# Patient Record
Sex: MALE | Race: WHITE | NOT HISPANIC OR LATINO | Employment: OTHER | URBAN - METROPOLITAN AREA
[De-identification: names, ages, dates, MRNs, and addresses within clinical notes are randomized per-mention and may not be internally consistent; named-entity substitution may affect disease eponyms.]

---

## 2018-02-02 ENCOUNTER — OFFICE VISIT (OUTPATIENT)
Dept: INTERNAL MEDICINE | Facility: CLINIC | Age: 78
End: 2018-02-02
Payer: MEDICARE

## 2018-02-02 VITALS
DIASTOLIC BLOOD PRESSURE: 75 MMHG | HEART RATE: 95 BPM | SYSTOLIC BLOOD PRESSURE: 132 MMHG | RESPIRATION RATE: 16 BRPM | BODY MASS INDEX: 30.38 KG/M2 | HEIGHT: 64 IN | WEIGHT: 177.94 LBS | TEMPERATURE: 98 F

## 2018-02-02 DIAGNOSIS — E55.9 VITAMIN D INSUFFICIENCY: ICD-10-CM

## 2018-02-02 DIAGNOSIS — Z85.828 HX OF BASAL CELL CARCINOMA: ICD-10-CM

## 2018-02-02 DIAGNOSIS — Z12.5 PROSTATE CANCER SCREENING: ICD-10-CM

## 2018-02-02 DIAGNOSIS — Z00.00 ANNUAL PHYSICAL EXAM: Primary | ICD-10-CM

## 2018-02-02 DIAGNOSIS — I10 ESSENTIAL HYPERTENSION: ICD-10-CM

## 2018-02-02 DIAGNOSIS — N41.1 CHRONIC PROSTATITIS: ICD-10-CM

## 2018-02-02 DIAGNOSIS — E66.9 OBESITY (BMI 30-39.9): ICD-10-CM

## 2018-02-02 PROCEDURE — 90670 PCV13 VACCINE IM: CPT | Mod: S$GLB,,, | Performed by: INTERNAL MEDICINE

## 2018-02-02 PROCEDURE — G0009 ADMIN PNEUMOCOCCAL VACCINE: HCPCS | Mod: S$GLB,,, | Performed by: INTERNAL MEDICINE

## 2018-02-02 PROCEDURE — 99387 INIT PM E/M NEW PAT 65+ YRS: CPT | Mod: S$GLB,,, | Performed by: INTERNAL MEDICINE

## 2018-02-02 PROCEDURE — 99999 PR PBB SHADOW E&M-NEW PATIENT-LVL III: CPT | Mod: PBBFAC,,, | Performed by: INTERNAL MEDICINE

## 2018-02-02 RX ORDER — ERGOCALCIFEROL 1.25 MG/1
50000 CAPSULE ORAL
COMMUNITY
Start: 2017-12-30 | End: 2021-07-26 | Stop reason: ALTCHOICE

## 2018-02-02 RX ORDER — LISINOPRIL 10 MG/1
10 TABLET ORAL DAILY
COMMUNITY
Start: 2018-01-19 | End: 2018-09-19 | Stop reason: SDUPTHER

## 2018-02-02 RX ORDER — BIMATOPROST 0.1 MG/ML
SOLUTION/ DROPS OPHTHALMIC
COMMUNITY
Start: 2017-12-30 | End: 2019-03-12 | Stop reason: ALTCHOICE

## 2018-02-02 NOTE — PROGRESS NOTES
Subjective:       Patient ID: Ajit Ayoub is a 77 y.o. male.    Chief Complaint: Establish Care    HPI   77 y.o. Male here for annual exam.     Cholesterol: (needs)  Vaccines: Influenza (2017); Tetanus (2014); Pneumovax (2018); Zoster (2012)  Eye exam: 2018  Colonoscopy: 2011    Exercise: no  Diet: regular    Past Medical History:  No date: Chronic prostatitis  No date: Hyperthyroidism  No date: Obesity (BMI 30-39.9)  No date: Vitamin D insufficiency  Past Surgical History:  No date: TRANSURETHRAL RESECTION OF PROSTATE  Social History    Marital status: Single              Spouse name:                       Years of education:                 Number of children:               Occupational History  Occupation          Employer            Comment               Retired warehouse *                         Social History Main Topics    Smoking status: Never Smoker                                                                Smokeless tobacco: Never Used                        Alcohol use: No              Drug use: No              Sexual activity: Not Currently     Partners with: Female    Review of patient's allergies indicates:  No Known Allergies  Mr. Ayoub had no medications administered during this visit.    Review of Systems   Constitutional: Negative for activity change, appetite change, chills, diaphoresis, fatigue, fever and unexpected weight change.   HENT: Negative for congestion, mouth sores, postnasal drip, rhinorrhea, sinus pressure, sneezing, sore throat, trouble swallowing and voice change.    Eyes: Negative for discharge, itching and visual disturbance.   Respiratory: Negative for cough, chest tightness, shortness of breath and wheezing.    Cardiovascular: Negative for chest pain, palpitations and leg swelling.   Gastrointestinal: Negative for abdominal pain, blood in stool, constipation, diarrhea, nausea and vomiting.   Endocrine: Negative for cold intolerance and heat intolerance.    Genitourinary: Negative for difficulty urinating, dysuria, flank pain, hematuria and urgency.   Musculoskeletal: Negative for arthralgias, back pain, myalgias and neck pain.   Skin: Negative for rash and wound.   Allergic/Immunologic: Negative for environmental allergies and food allergies.   Neurological: Negative for dizziness, tremors, seizures, syncope, weakness and headaches.   Hematological: Negative for adenopathy. Does not bruise/bleed easily.   Psychiatric/Behavioral: Negative for confusion, sleep disturbance and suicidal ideas. The patient is not nervous/anxious.        Objective:      Physical Exam   Constitutional: He is oriented to person, place, and time. He appears well-developed and well-nourished. No distress.   HENT:   Head: Normocephalic and atraumatic.   Right Ear: External ear normal.   Left Ear: External ear normal.   Nose: Nose normal.   Mouth/Throat: Oropharynx is clear and moist. No oropharyngeal exudate.   Eyes: Conjunctivae and EOM are normal. Pupils are equal, round, and reactive to light. Right eye exhibits no discharge. Left eye exhibits no discharge. No scleral icterus.   Neck: Normal range of motion. Neck supple. No JVD present. No thyromegaly present.   Cardiovascular: Normal rate, regular rhythm, normal heart sounds and intact distal pulses.    No murmur heard.  Pulmonary/Chest: Effort normal and breath sounds normal. No respiratory distress. He has no wheezes. He has no rales.   Abdominal: Soft. Bowel sounds are normal. He exhibits no distension. There is no tenderness. There is no guarding.   Musculoskeletal: He exhibits no edema.   Lymphadenopathy:     He has no cervical adenopathy.   Neurological: He is alert and oriented to person, place, and time. No cranial nerve deficit. Coordination normal.   Skin: Skin is warm and dry. No rash noted. He is not diaphoretic. No pallor.   Psychiatric: He has a normal mood and affect. Judgment normal.       Assessment:       1. Annual  physical exam    2. Essential hypertension    3. Chronic prostatitis    4. Obesity (BMI 30-39.9)    5. Vitamin D insufficiency    6. Hx of basal cell carcinoma    7. Prostate cancer screening        Plan:    1. Complete blood work UA       Vaccines: Influenza (2017); Tetanus (2014); Pneumovax (2018); Zoster (2012)       Eye exam: 2018       Colonoscopy: 2011   2. HTN- controlled on Lisinopril 10 mg daily    3. Chronic prostatitis- referral to Urology    4. Obesity- pt advised on proper diet/exercise for weight loss   5. Vitamin D insuff- repeat level       Currently on Rx replacement qmonth    6. Hx of basal cell carcinoma on face- followed by Derm

## 2018-02-09 ENCOUNTER — TELEPHONE (OUTPATIENT)
Dept: INTERNAL MEDICINE | Facility: CLINIC | Age: 78
End: 2018-02-09

## 2018-02-09 ENCOUNTER — OFFICE VISIT (OUTPATIENT)
Dept: INTERNAL MEDICINE | Facility: CLINIC | Age: 78
End: 2018-02-09
Payer: MEDICARE

## 2018-02-09 VITALS
OXYGEN SATURATION: 95 % | RESPIRATION RATE: 16 BRPM | DIASTOLIC BLOOD PRESSURE: 76 MMHG | WEIGHT: 178.38 LBS | HEIGHT: 64 IN | TEMPERATURE: 99 F | SYSTOLIC BLOOD PRESSURE: 142 MMHG | BODY MASS INDEX: 30.45 KG/M2 | HEART RATE: 88 BPM

## 2018-02-09 DIAGNOSIS — N41.1 CHRONIC PROSTATITIS: ICD-10-CM

## 2018-02-09 DIAGNOSIS — T78.40XA ALLERGIC REACTION, INITIAL ENCOUNTER: Primary | ICD-10-CM

## 2018-02-09 PROCEDURE — 1126F AMNT PAIN NOTED NONE PRSNT: CPT | Mod: S$GLB,,, | Performed by: INTERNAL MEDICINE

## 2018-02-09 PROCEDURE — 99999 PR PBB SHADOW E&M-EST. PATIENT-LVL IV: CPT | Mod: PBBFAC,,, | Performed by: INTERNAL MEDICINE

## 2018-02-09 PROCEDURE — 99214 OFFICE O/P EST MOD 30 MIN: CPT | Mod: S$GLB,,, | Performed by: INTERNAL MEDICINE

## 2018-02-09 PROCEDURE — 1159F MED LIST DOCD IN RCRD: CPT | Mod: S$GLB,,, | Performed by: INTERNAL MEDICINE

## 2018-02-09 PROCEDURE — 3008F BODY MASS INDEX DOCD: CPT | Mod: S$GLB,,, | Performed by: INTERNAL MEDICINE

## 2018-02-09 RX ORDER — PREDNISONE 10 MG/1
TABLET ORAL DAILY
Qty: 30 TABLET | Refills: 0 | Status: SHIPPED | OUTPATIENT
Start: 2018-02-09 | End: 2018-02-21

## 2018-02-09 NOTE — TELEPHONE ENCOUNTER
----- Message from Maria D Burns sent at 2/8/2018  3:48 PM CST -----  Contact: self/[phone]  patient called in regards needing to talk with Dr Sen about a rash that he have develop where he receive his pneumonia shot. Patient stated that it goes worse every time that he wear long sleeve shirts.   Please call and advise.       Thank you!!!

## 2018-02-09 NOTE — PROGRESS NOTES
Subjective:       Patient ID: Ajit Ayoub is a 77 y.o. male.    Chief Complaint: Medication Reaction (pt states he recieved pneumonia vaccination friday on RT arm; pt complains of redness and itching near injection site; pt also reports CT scan 2 months ago made him break out in hives; pt also states he has contact dermatitis on feet and sinus problems)    HPI   Patient presenting with medication reaction from pneumonia shot last Friday.  He notes hx of contact dermatitis.  He has seen dermatology in the past, Dr. [facility].  He notes the long sleeve and long pants irritate him.  He denies any fevers or chills.  He denies any trouble breathing or wheezing.  He has hx of sensitive skin and sinus issues.  He also notes that when he got a CAT scan 2 months ago he developed a purpuric rash on his arms and made him break out in hives as well.  He has some itching at the site.       Review of Systems   Constitutional: Negative for activity change, chills and fever.   HENT: Negative for congestion, rhinorrhea, sinus pain, sinus pressure and sore throat.    Eyes: Negative for pain and redness.   Respiratory: Negative for cough and shortness of breath.    Cardiovascular: Negative for chest pain and palpitations.   Gastrointestinal: Negative for abdominal pain, diarrhea, nausea and vomiting.   Genitourinary: Negative for difficulty urinating.   Musculoskeletal: Negative for arthralgias and back pain.   Skin: Positive for rash.   Neurological: Negative for weakness and headaches.   Psychiatric/Behavioral: Negative for dysphoric mood and sleep disturbance.     Objective:     Vitals:    02/09/18 1414   BP: (!) 142/76   Pulse: 88   Resp: 16   Temp: 99.1 °F (37.3 °C)    Body mass index is 30.61 kg/m².  Physical Exam   Constitutional: He is oriented to person, place, and time. He appears well-developed and well-nourished.   HENT:   Head: Normocephalic and atraumatic.   Mouth/Throat: Oropharynx is clear and moist.   Eyes:  Conjunctivae are normal. Pupils are equal, round, and reactive to light.   Neck: Normal range of motion. Neck supple. No thyromegaly present.   Cardiovascular: Normal rate, regular rhythm and normal heart sounds.  Exam reveals no gallop and no friction rub.    No murmur heard.  Pulmonary/Chest: Effort normal and breath sounds normal. He has no wheezes. He has no rales.   Musculoskeletal: Normal range of motion. He exhibits no edema or tenderness.   Lymphadenopathy:     He has no cervical adenopathy.   Neurological: He is alert and oriented to person, place, and time.   Skin: Skin is warm and dry. Rash noted. There is erythema.   Erythema and mild warmth over right upper extremity from previous injection, no fluctuance    Skin does have some mild erythema associated with it    Few hyperpigmented areas noted along the upper extremities    Contact dermatitis of bilateral lower extremities   Psychiatric: He has a normal mood and affect. Judgment normal.     Assessment:     1. Allergic reaction, initial encounter    2. Chronic prostatitis      Plan:   1. Allergic reaction, initial encounter  - predniSONE (DELTASONE) 10 mg tablet pack; Take by mouth once daily. Take 4 tabs x 3 days, 3 tabs x 3 days, 2 tabs x 3 days then 1 tab x 3 day  Dispense: 30 tablet; Refill: 0  - Discussed good skin care  - benadryl prn  - ED precautions provided    2. Chronic prostatitis  - stable, follows with urology      Patient is to call or return to clinic if symptoms worsen or fail to improve.

## 2018-02-09 NOTE — TELEPHONE ENCOUNTER
----- Message from Ana Waite sent at 2/9/2018  4:57 PM CST -----  Contact: Pt [phone]  Pt would like a call back from the nurse to clarify the prescription for his prednisone. IF he is not home please leave a message

## 2018-02-09 NOTE — PATIENT INSTRUCTIONS
Please switch to hypoallergenic detergent- all free and clear or tide    Dove or dial unscented body wash   General Allergic Reactions  An allergic reaction is a set of symptoms caused by an allergen. An allergen is something that causes a persons immune system to react. When a person comes in contact with an allergen, it causes the body to release chemicals. These include the chemical histamine. Histamine causes swelling and itching. It may affect the entire body. This is called a general allergic reaction. Often symptoms affect only 1 part of the body. This is called a local allergic reaction.  You are having an allergic reaction. Almost anything can cause one. Different people are allergic to different things. It is usually something that you ate or swallowed, came into contact with by getting or putting it on your skin or clothes, or something you breathed in the air. This can be very annoying and sometimes scary.  Most of us think of allergic reactions when we have a rash or itchy skin. Symptoms can include:  · Itching of the eyes, nose, and roof of the mouth  · Runny or stuffy nose  · Watery eyes   · Sneezing or coughing   · A blocked feeling in the ear  · Red, itchy rash called hives  · Red and purple spots  · Rash, redness, welts, blisters  · Itching, burning, stinging, pain  · Dry, flaky, cracking, scaly skin  Severe symptoms include:  · Swelling of the face, lips, or other parts of the body  · Hoarse voice  · Trouble swallowing, feeling like your throat is closing  · Trouble breathing, wheezing  · Nausea, vomiting, diarrhea, stomach cramps  · Feeling faint or lightheaded, rapid heart rate  Sometimes the cause may be obvious. But there are so many things that can cause a reaction that you may not be able to figure out. The most important things to help find your allergen are:  · Remembering when it started  · What you were doing at the time or just before that  · Any activities you were involved in  · Any  new products or contacts  Below are some common causes. But remember that almost anything can cause a reaction. You may not even be aware that you came into contact with one of these things:  · Dust, mold, pollen  · Plants (common ones are poison ivy and poison oak, but there are many others)   · Animals  · Foods such as shrimp, shellfish, peanuts, milk products, gluten, and eggs. Also food colorings, flavorings, and additives.  · Insect bites or stings such as bees, mosquitos, fleas, ticks  · Medicines such as penicillin, sulfa medicines, amoxicillin, aspirin, and ibuprofen. But any medicine can cause a reaction.  · Jewelry such as nickel or gold. This can be new, or something youve worn for a while, including zippers and buttons.  · Latex such as in gloves, clothes, toys, balloons, or some tapes. Some people allergic to latex may also have problems with foods like bananas, avocados, kiwi, papaya, or chestnuts.  · Lotions, perfumes, cosmetics, soaps, shampoos, skincare products, nail products  · Chemicals or dyes in clothing, linen, , hair dyes, soaps, iodine  Many viruses and common colds can cause a rash that is not an allergic reaction. Sometimes it is hard to tell the difference between allergies, sensitivity, or an intolerance to something. This is especially true with food. Many things can cause diarrhea, vomiting, stomach cramps, and skin irritation.  Home care    The goal of treatment is to help relieve the symptoms and get you feeling better. The rash will usually fade over several days. But it can sometimes last a couple of weeks. Over the next couple of days, there may be times when it is gets a little worse, and then better again. Here are some things to do:  · If you know what you are allergic to, stay away from it. Future reactions could be worse than this one.  · Avoid tight clothing and anything that heats up your skin (hot showers or baths, direct sunlight). Heat will make itching  worse.  · An ice pack will relieve local areas of intense itching and redness. To make an ice pack, put ice cubes in a plastic bag that seals at the top. Wrap it in a thin, clean towel. Dont put the ice directly on the skin because it can damage the skin.  · Oral diphenhydramine is an over-the-counter antihistamine sold at pharmacy and grocery stores. Unless a prescription antihistamine was given, diphenhydramine may be used to reduce itching if large areas of the skin are involved. It may make you sleepy. So be careful using it in the daytime or when going to school, working, or driving. Note: Dont use diphenhydramine if you have glaucoma or if you are a man with trouble urinating due to an enlarged prostate. There are other antihistamines that wont make you so sleepy. These are good choices for daytime use. Ask your pharmacist for suggestions.  · Dont use diphenhydramine cream on your skin. It can cause a further reaction in some people.  · To help prevent an infection, don't scratch the affected area. Scratching may worsen the reaction and damage your skin. It can also lead to an infection. Always check the affected for signs of an infection.  · Call your healthcare provider and ask what you can use to help decrease the itching.  · To decrease allergic reactions, try the following:    · Use heat-steam to clean your home  · Use high-efficiency particulate (HEPA) vacuums and filters  · Stay away from food and pet triggers  · Kill any cockroaches  · Clean your house often  Follow-up care  Follow up with your healthcare provider, or as advised. If you had a severe reaction today, or if you have had several mild to medium allergic reactions in the past, ask your provider about allergy testing. This can help you find out what you are allergic to. If your reaction included dizziness, fainting, or trouble breathing or swallowing, ask your provider about carrying auto-injectable epinephrine.  Call 911  Call 911 if any  of these occur:  · Trouble breathing or swallowing, wheezing  · Cool, moist, pale skin  · Shortness of breath  · Hoarse voice or trouble speaking  · Confused   · Very drowsy or trouble awakening  · Fainting or loss of consciousness  · Rapid heart rate  · Feeling of dizziness or weakness or a sudden drop in blood pressure  · Feeling of doom  · Feeling lightheaded  · Severe nausea or vomiting, or diarrhea  · Seizure  · Swelling in the face, eyelids, lips, mouth, throat or tongue  · Drooling  When to seek medical advice  Call your healthcare provider right away if any of these occur:  · Spreading areas of itching, redness or swelling  · Nausea or stomach cramps or abdominal pain  · Continuing or recurring symptoms  · Spreading areas of redness, swelling, or itching  · Signs of infection at the affected site:  ¨ Spreading redness  ¨ Increased pain or swelling  ¨ Fluid or colored drainage from the site  ¨ Fever of 100.4°F (38°C) or above lasting for 24 to 48 hours, or as directed by your provider  Date Last Reviewed: 3/1/2017  © 4013-4331 [employer]. [address] [place] [address]. All rights reserved. This information is not intended as a substitute for professional medical care. Always follow your healthcare professional's instructions.

## 2018-02-21 ENCOUNTER — LAB VISIT (OUTPATIENT)
Dept: LAB | Facility: HOSPITAL | Age: 78
End: 2018-02-21
Attending: INTERNAL MEDICINE
Payer: MEDICARE

## 2018-02-21 DIAGNOSIS — Z12.5 PROSTATE CANCER SCREENING: ICD-10-CM

## 2018-02-21 DIAGNOSIS — E55.9 VITAMIN D INSUFFICIENCY: ICD-10-CM

## 2018-02-21 DIAGNOSIS — E66.9 OBESITY (BMI 30-39.9): ICD-10-CM

## 2018-02-21 DIAGNOSIS — I10 ESSENTIAL HYPERTENSION: ICD-10-CM

## 2018-02-21 DIAGNOSIS — Z00.00 ANNUAL PHYSICAL EXAM: ICD-10-CM

## 2018-02-21 DIAGNOSIS — N41.1 CHRONIC PROSTATITIS: ICD-10-CM

## 2018-02-21 LAB
25(OH)D3+25(OH)D2 SERPL-MCNC: 45 NG/ML
ALBUMIN SERPL BCP-MCNC: 3.9 G/DL
ALP SERPL-CCNC: 85 U/L
ALT SERPL W/O P-5'-P-CCNC: 21 U/L
ANION GAP SERPL CALC-SCNC: 9 MMOL/L
AST SERPL-CCNC: 25 U/L
BASOPHILS # BLD AUTO: 0.03 K/UL
BASOPHILS NFR BLD: 0.3 %
BILIRUB SERPL-MCNC: 0.9 MG/DL
BUN SERPL-MCNC: 25 MG/DL
CALCIUM SERPL-MCNC: 9.8 MG/DL
CHLORIDE SERPL-SCNC: 103 MMOL/L
CHOLEST SERPL-MCNC: 196 MG/DL
CHOLEST/HDLC SERPL: 3.4 {RATIO}
CO2 SERPL-SCNC: 29 MMOL/L
COMPLEXED PSA SERPL-MCNC: 2.1 NG/ML
CREAT SERPL-MCNC: 0.9 MG/DL
DIFFERENTIAL METHOD: ABNORMAL
EOSINOPHIL # BLD AUTO: 0 K/UL
EOSINOPHIL NFR BLD: 0.2 %
ERYTHROCYTE [DISTWIDTH] IN BLOOD BY AUTOMATED COUNT: 12.6 %
EST. GFR  (AFRICAN AMERICAN): >60 ML/MIN/1.73 M^2
EST. GFR  (NON AFRICAN AMERICAN): >60 ML/MIN/1.73 M^2
GLUCOSE SERPL-MCNC: 130 MG/DL
HCT VFR BLD AUTO: 45.8 %
HDLC SERPL-MCNC: 58 MG/DL
HDLC SERPL: 29.6 %
HGB BLD-MCNC: 15.4 G/DL
IMM GRANULOCYTES # BLD AUTO: 0.04 K/UL
IMM GRANULOCYTES NFR BLD AUTO: 0.4 %
LDLC SERPL CALC-MCNC: 125.4 MG/DL
LYMPHOCYTES # BLD AUTO: 1.5 K/UL
LYMPHOCYTES NFR BLD: 15.1 %
MCH RBC QN AUTO: 33.4 PG
MCHC RBC AUTO-ENTMCNC: 33.6 G/DL
MCV RBC AUTO: 99 FL
MONOCYTES # BLD AUTO: 0.6 K/UL
MONOCYTES NFR BLD: 5.9 %
NEUTROPHILS # BLD AUTO: 7.7 K/UL
NEUTROPHILS NFR BLD: 78.1 %
NONHDLC SERPL-MCNC: 138 MG/DL
NRBC BLD-RTO: 0 /100 WBC
PLATELET # BLD AUTO: 173 K/UL
PMV BLD AUTO: 11.3 FL
POTASSIUM SERPL-SCNC: 4.3 MMOL/L
PROT SERPL-MCNC: 7.1 G/DL
RBC # BLD AUTO: 4.61 M/UL
SODIUM SERPL-SCNC: 141 MMOL/L
TRIGL SERPL-MCNC: 63 MG/DL
TSH SERPL DL<=0.005 MIU/L-ACNC: 1.06 UIU/ML
WBC # BLD AUTO: 9.91 K/UL

## 2018-02-21 PROCEDURE — 84443 ASSAY THYROID STIM HORMONE: CPT

## 2018-02-21 PROCEDURE — 82306 VITAMIN D 25 HYDROXY: CPT

## 2018-02-21 PROCEDURE — 36415 COLL VENOUS BLD VENIPUNCTURE: CPT | Mod: PO

## 2018-02-21 PROCEDURE — 84153 ASSAY OF PSA TOTAL: CPT

## 2018-02-21 PROCEDURE — 85025 COMPLETE CBC W/AUTO DIFF WBC: CPT

## 2018-02-21 PROCEDURE — 80061 LIPID PANEL: CPT

## 2018-02-21 PROCEDURE — 80053 COMPREHEN METABOLIC PANEL: CPT

## 2018-02-26 ENCOUNTER — TELEPHONE (OUTPATIENT)
Dept: INTERNAL MEDICINE | Facility: CLINIC | Age: 78
End: 2018-02-26

## 2018-02-26 ENCOUNTER — OFFICE VISIT (OUTPATIENT)
Dept: UROLOGY | Facility: CLINIC | Age: 78
End: 2018-02-26
Payer: MEDICARE

## 2018-02-26 VITALS
SYSTOLIC BLOOD PRESSURE: 160 MMHG | HEIGHT: 64 IN | WEIGHT: 178 LBS | DIASTOLIC BLOOD PRESSURE: 89 MMHG | HEART RATE: 81 BPM | BODY MASS INDEX: 30.39 KG/M2

## 2018-02-26 DIAGNOSIS — R31.29 HEMATURIA, MICROSCOPIC: ICD-10-CM

## 2018-02-26 DIAGNOSIS — Z90.79 S/P TURP: ICD-10-CM

## 2018-02-26 DIAGNOSIS — N41.1 CHRONIC PROSTATITIS: Primary | ICD-10-CM

## 2018-02-26 DIAGNOSIS — R73.9 BLOOD GLUCOSE ELEVATED: Primary | ICD-10-CM

## 2018-02-26 DIAGNOSIS — R31.9 HEMATURIA, UNSPECIFIED TYPE: ICD-10-CM

## 2018-02-26 PROCEDURE — 1159F MED LIST DOCD IN RCRD: CPT | Mod: S$GLB,,, | Performed by: UROLOGY

## 2018-02-26 PROCEDURE — 99999 PR PBB SHADOW E&M-EST. PATIENT-LVL IV: CPT | Mod: PBBFAC,,, | Performed by: UROLOGY

## 2018-02-26 PROCEDURE — 3008F BODY MASS INDEX DOCD: CPT | Mod: S$GLB,,, | Performed by: UROLOGY

## 2018-02-26 PROCEDURE — 1126F AMNT PAIN NOTED NONE PRSNT: CPT | Mod: S$GLB,,, | Performed by: UROLOGY

## 2018-02-26 PROCEDURE — 88112 CYTOPATH CELL ENHANCE TECH: CPT | Performed by: PATHOLOGY

## 2018-02-26 PROCEDURE — 99204 OFFICE O/P NEW MOD 45 MIN: CPT | Mod: S$GLB,,, | Performed by: UROLOGY

## 2018-02-26 RX ORDER — SULFAMETHOXAZOLE AND TRIMETHOPRIM 800; 160 MG/1; MG/1
TABLET ORAL
COMMUNITY
Start: 2017-12-30 | End: 2018-12-17

## 2018-02-26 RX ORDER — SULFAMETHOXAZOLE AND TRIMETHOPRIM 400; 80 MG/1; MG/1
1 TABLET ORAL DAILY
Qty: 90 TABLET | Refills: 3 | Status: SHIPPED | OUTPATIENT
Start: 2018-02-26 | End: 2018-12-17

## 2018-02-26 RX ORDER — IBUPROFEN 600 MG/1
TABLET ORAL
COMMUNITY
Start: 2018-01-11 | End: 2019-11-05

## 2018-02-26 NOTE — LETTER
February 26, 2018      Homer Sen DO  [address] [facility] [place] [address]           [facility]  [address] [facility] [address]  Phone: [phone]          Patient: Ajit Ayoub   MR Number: [id number]   YOB: 1940   Date of Visit: 2/26/2018       Dear Dr. Homer Sen:    Thank you for referring Ajit Ayoub to me for evaluation. Attached you will find relevant portions of my assessment and plan of care.    If you have questions, please do not hesitate to call me. I look forward to following Ajit Ayoub along with you.    Sincerely,    Renato Flores MD    Enclosure  CC:  No Recipients    If you would like to receive this communication electronically, please contact [email or web address] or [phone] to request more information on [employer] access.    For providers and/or their staff who would like to refer a patient to [facility], please contact us through our one-stop-shop provider referral line, [facility], at [phone].    If you feel you have received this communication in error or would no longer like to receive these types of communications, please e-mail [email or web address]

## 2018-02-26 NOTE — PROGRESS NOTES
CC: recurrent prostatitis, chronic hematurai    Ajit Ayoub is a 77 y.o. man who is here for the evaluation of Prostatitis (has been seeing dr. adams at [facility] for years, but he is switching care to [facility]. has TURP about 11 years ago. states since the TURP he has chronic prostatitis and has to take 1/2 tablet of bactrim daily . he has been doing this for 12 years. he also states that since the turp he usually has a trace of blood in the urine )    A new pt referred by his PCP, Dr. Sen.  S/p TURP 2007.   has been on Bactrim for suppressive abx for recurrent prostatitis.  Has done well on suppressive abx.  Hx of chronic asymptomatic microscopic hematuria.  Hx of renal cyst.  Denies flank pain, dysuira, hematuria.      Smoking:none  No family of urologic malignancy.  Positive kidney stone hx.    Past Medical History:   Diagnosis Date    Chronic prostatitis     Hyperthyroidism     Obesity (BMI 30-39.9)     Vitamin D insufficiency      Past Surgical History:   Procedure Laterality Date    TRANSURETHRAL RESECTION OF PROSTATE       Social History   Substance Use Topics    Smoking status: Never Smoker    Smokeless tobacco: Never Used    Alcohol use No     Family History   Problem Relation Age of Onset    Diabetes Mother     Heart disease Mother     Diabetes Father     Heart disease Father     Cancer Neg Hx     Stroke Neg Hx      Allergy:  Review of patient's allergies indicates:   Allergen Reactions    Contrast media Rash    Pneumoc 13-lori conj-dip cr(pf) Rash     Outpatient Encounter Prescriptions as of 2/26/2018   Medication Sig Dispense Refill    ergocalciferol (ERGOCALCIFEROL) 50,000 unit Cap       ibuprofen (ADVIL,MOTRIN) 600 MG tablet       lisinopril 10 MG tablet       LUMIGAN 0.01 % Drop       sulfamethoxazole-trimethoprim 800-160mg (BACTRIM DS) 800-160 mg Tab       sulfamethoxazole-trimethoprim 400-80mg (BACTRIM) 400-80 mg per tablet Take 1 tablet by mouth once daily. 90 tablet  3     No facility-administered encounter medications on file as of 2/26/2018.      Review of Systems   General ROS: GENERAL:  No weight gain or loss  SKIN:  No rashes or lacerations  HEAD:  No headaches  EYES:  No exophthalmos, jaundice or blindness  EARS:  No dizziness, tinnitus or hearing loss  NOSE:  No changes in smell  MOUTH & THROAT:  No dyskinetic movements or obvious goiter  CHEST:  No shortness of breath, hyperventilation or cough  CARDIOVASCULAR:  No tachycardia or chest pain  ABDOMEN:  No nausea, vomiting, pain, constipation or diarrhea  URINARY:  No frequency, dysuria or sexual dysfunction  ENDOCRINE:  No polydipsia, polyuria  MUSCULOSKELETAL:  No pain or stiffness of the joints  NEUROLOGIC:  No weakness, sensory changes, seizures, confusion, memory loss, tremor or other abnormal movements  Physical Exam     Vitals:    02/26/18 1311   BP: (!) 160/89   Pulse: 81     Physical Exam   Constitutional: He is oriented to person, place, and time. He appears well-developed and well-nourished. No distress.   HENT:   Head: Normocephalic and atraumatic.   Right Ear: External ear normal.   Left Ear: External ear normal.   Nose: Nose normal.   Mouth/Throat: Oropharynx is clear and moist.   Eyes: Conjunctivae are normal. Pupils are equal, round, and reactive to light.   Neck: Normal range of motion. Neck supple. No JVD present. No tracheal deviation present. No thyromegaly present.   Cardiovascular: Normal rate, regular rhythm, normal heart sounds and intact distal pulses.  Exam reveals no gallop and no friction rub.    No murmur heard.  Pulmonary/Chest: Effort normal and breath sounds normal. No respiratory distress. He has no wheezes. He exhibits no tenderness.   Abdominal: Soft. Bowel sounds are normal. He exhibits no distension and no mass. There is no tenderness. There is no rebound and no guarding.   Genitourinary: Rectum normal and penis normal. No penile tenderness.   Genitourinary Comments: Prostate 30 grams  with negative nodule or negative tenderness.  Slightly boggy.     Musculoskeletal: Normal range of motion. He exhibits no edema, tenderness or deformity.   Lymphadenopathy:     He has no cervical adenopathy.   Neurological: He is alert and oriented to person, place, and time.   Skin: Skin is warm and dry. He is not diaphoretic.     Psychiatric: He has a normal mood and affect. His behavior is normal. Thought content normal.     Genitalia:  Scrotum: no rash or lesion  Normal symmetric epididymis without masses  Normal vas palpated  Normal size, symmetric testicles with no masses   Normal urethral meatus with no discharge  Normal circumcised penis with no lesion   Rectal:  Normal perineum and anus upon inspection.  Normal tone, no masses or tenderness;     LABS:  Lab Results   Component Value Date    PSA 2.1 02/21/2018     No results found for this or any previous visit.  Lab Results   Component Value Date    CREATININE 0.9 02/21/2018     No results found for this or any previous visit.  No results found for: LABURIN  UA trace blood otherwise, clear    Assessment and Plan:  Ajit was seen today for prostatitis.    Diagnoses and all orders for this visit:    Chronic prostatitis  -     POCT urine dipstick without microscope  -     sulfamethoxazole-trimethoprim 400-80mg (BACTRIM) 400-80 mg per tablet; Take 1 tablet by mouth once daily.    Hematuria, microscopic  -     POCT urine dipstick without microscope  -     Cytology, urine    S/P TURP    will do a baseline US.  Urine cytology today.  If normal, will leave him alone.  Continue bactrim SS daily as he has been for many years.  Follow-up:  Follow-up in about 6 months (around 8/26/2018).

## 2018-02-26 NOTE — TELEPHONE ENCOUNTER
----- Message from Homer Sen DO sent at 2/26/2018  1:44 PM CST -----  Mild blood seen in urine study- would like to repeat with Cx in 2 weeks

## 2018-03-02 ENCOUNTER — HOSPITAL ENCOUNTER (OUTPATIENT)
Dept: RADIOLOGY | Facility: HOSPITAL | Age: 78
Discharge: HOME | End: 2018-03-02
Attending: UROLOGY
Payer: MEDICARE

## 2018-03-02 DIAGNOSIS — R31.29 HEMATURIA, MICROSCOPIC: ICD-10-CM

## 2018-03-02 PROCEDURE — 76775 US EXAM ABDO BACK WALL LIM: CPT | Mod: 26,,, | Performed by: RADIOLOGY

## 2018-03-02 PROCEDURE — 76775 US EXAM ABDO BACK WALL LIM: CPT | Mod: TC

## 2018-03-05 ENCOUNTER — TELEPHONE (OUTPATIENT)
Dept: UROLOGY | Facility: CLINIC | Age: 78
End: 2018-03-05

## 2018-03-05 NOTE — TELEPHONE ENCOUNTER
----- Message from Renato Flores MD sent at 3/2/2018  3:31 PM CST -----  Please call the patient regarding abnormal test results.  Follow up with nephrologist regarding his kidney functions.

## 2018-03-07 ENCOUNTER — TELEPHONE (OUTPATIENT)
Dept: UROLOGY | Facility: CLINIC | Age: 78
End: 2018-03-07

## 2018-03-07 NOTE — TELEPHONE ENCOUNTER
----- Message from Penelope Sousa LPN sent at 3/7/2018  7:27 AM CST -----  Contact: self - [phone]  I gave him the ultrasound results and the recommendation to see nephrology per your instructions. He wants to know why he needs to see nephrology. He states that he has no hx of kidney disease   ----- Message -----  From: Tammi Zafar  Sent: 3/6/2018   4:27 PM  To: Mark HYATT Staff    Mark - wants to know why he needs to see a kidney md - please call patient at [phone]

## 2018-03-07 NOTE — TELEPHONE ENCOUNTER
US  #1. Bilateral medical renal disease.      #2.  One simple right renal cyst and one minimally complex right renal cyst.    He is on suppressive abx with SS bactrim.  I would like to have him follow up with a nephrologist.  Left a message.

## 2018-03-08 ENCOUNTER — LAB VISIT (OUTPATIENT)
Dept: LAB | Facility: HOSPITAL | Age: 78
End: 2018-03-08
Attending: INTERNAL MEDICINE
Payer: MEDICARE

## 2018-03-08 ENCOUNTER — OFFICE VISIT (OUTPATIENT)
Dept: NEPHROLOGY | Facility: CLINIC | Age: 78
End: 2018-03-08
Payer: MEDICARE

## 2018-03-08 VITALS
BODY MASS INDEX: 29.81 KG/M2 | HEIGHT: 64 IN | DIASTOLIC BLOOD PRESSURE: 80 MMHG | HEART RATE: 83 BPM | SYSTOLIC BLOOD PRESSURE: 122 MMHG | OXYGEN SATURATION: 95 % | WEIGHT: 174.63 LBS

## 2018-03-08 DIAGNOSIS — R31.29 HEMATURIA, MICROSCOPIC: ICD-10-CM

## 2018-03-08 DIAGNOSIS — N18.1 CKD (CHRONIC KIDNEY DISEASE) STAGE 1, GFR 90 ML/MIN OR GREATER: ICD-10-CM

## 2018-03-08 DIAGNOSIS — I10 ESSENTIAL HYPERTENSION: Primary | ICD-10-CM

## 2018-03-08 LAB
BILIRUB UR QL STRIP: NEGATIVE
CLARITY UR REFRACT.AUTO: ABNORMAL
COLOR UR AUTO: ABNORMAL
CREAT UR-MCNC: 98 MG/DL
CREAT UR-MCNC: 98 MG/DL
GLUCOSE UR QL STRIP: NEGATIVE
HGB UR QL STRIP: NEGATIVE
KETONES UR QL STRIP: ABNORMAL
LEUKOCYTE ESTERASE UR QL STRIP: ABNORMAL
MICROALBUMIN UR DL<=1MG/L-MCNC: 32 UG/ML
MICROALBUMIN/CREATININE RATIO: 32.7 UG/MG
MICROSCOPIC COMMENT: ABNORMAL
NITRITE UR QL STRIP: NEGATIVE
PH UR STRIP: 5 [PH] (ref 5–8)
PROT UR QL STRIP: NEGATIVE
PROT UR-MCNC: 12 MG/DL
PROT/CREAT RATIO, UR: 0.12
RBC #/AREA URNS AUTO: 9 /HPF (ref 0–4)
SP GR UR STRIP: 1.02 (ref 1–1.03)
URN SPEC COLLECT METH UR: ABNORMAL
UROBILINOGEN UR STRIP-ACNC: NEGATIVE EU/DL
WBC #/AREA URNS AUTO: 6 /HPF (ref 0–5)

## 2018-03-08 PROCEDURE — 82043 UR ALBUMIN QUANTITATIVE: CPT

## 2018-03-08 PROCEDURE — 81001 URINALYSIS AUTO W/SCOPE: CPT

## 2018-03-08 PROCEDURE — 3079F DIAST BP 80-89 MM HG: CPT | Mod: S$GLB,,, | Performed by: INTERNAL MEDICINE

## 2018-03-08 PROCEDURE — 3074F SYST BP LT 130 MM HG: CPT | Mod: S$GLB,,, | Performed by: INTERNAL MEDICINE

## 2018-03-08 PROCEDURE — 99204 OFFICE O/P NEW MOD 45 MIN: CPT | Mod: S$GLB,,, | Performed by: INTERNAL MEDICINE

## 2018-03-08 PROCEDURE — 82570 ASSAY OF URINE CREATININE: CPT | Mod: 91

## 2018-03-08 PROCEDURE — 99999 PR PBB SHADOW E&M-EST. PATIENT-LVL III: CPT | Mod: PBBFAC,,, | Performed by: INTERNAL MEDICINE

## 2018-03-08 NOTE — PROGRESS NOTES
Subjective:       Patient ID: Ajit Ayoub is a 77 y.o. White male who presents for new evaluation of hematuria  The patient has a history of chronic prostatitis (on bactrim 400mg daily), BPH, TURP about 11 years ago, HTN x 3-4 yrs, no DM.   The patient has no family history of kidney disease, no history of kidney stones. The patient does not freuqently use NSAIDS or herbal supplements.   The patient is send here by Dr. Flores because of increased echogenicity in his renal US.    HPI  Review of Systems   Constitutional: Negative.    HENT: Negative.    Eyes: Negative.    Respiratory: Negative.    Cardiovascular: Negative.    Gastrointestinal: Negative.  Negative for diarrhea, nausea and vomiting.   Genitourinary: Negative for decreased urine volume, difficulty urinating, dysuria, hematuria and urgency.   Musculoskeletal: Negative.    Skin: Negative.    Neurological: Negative.    Psychiatric/Behavioral: Negative.        Objective:      Physical Exam   Constitutional: He is oriented to person, place, and time. He appears well-developed and well-nourished.   HENT:   Head: Normocephalic and atraumatic.   Right Ear: External ear normal.   Left Ear: External ear normal.   Nose: Nose normal.   Mouth/Throat: Oropharynx is clear and moist.   Eyes: Conjunctivae and EOM are normal. Pupils are equal, round, and reactive to light.   Neck: Normal range of motion. Neck supple. No JVD present.   Cardiovascular: Normal rate, regular rhythm and intact distal pulses.    Murmur (systilic) heard.  Pulmonary/Chest: Effort normal and breath sounds normal. No stridor. No respiratory distress. He has no rales. He exhibits no tenderness.   Abdominal: Soft. Bowel sounds are normal. He exhibits no distension and no mass. There is no tenderness. There is no rebound and no guarding.   Musculoskeletal: Normal range of motion.   Lymphadenopathy:     He has no cervical adenopathy.   Neurological: He is alert and oriented to person, place, and time.  He has normal reflexes. No cranial nerve deficit. Coordination normal.   Skin: Skin is warm and dry.   Psychiatric: He has a normal mood and affect. His behavior is normal. Judgment and thought content normal.   Nursing note and vitals reviewed.      Assessment:       1. Essential hypertension    2. Hematuria, microscopic    3. CKD (chronic kidney disease) stage 1, GFR 90 ml/min or greater        Plan:       1. CKD1: normal kidney function but evidence of increased echogenicity in the renal ultrasound, no evidence of proteinuria.   - We will send the patient for a UA and protein/creatinine ratio and uric acid level.  - Has evidence of microscopic hematuria and is followed by urology, most likely from his prostatitis.     Urine sediment demonstrated a large number of RBC (not dysmorphic) and WBCs with some unidentified larger cells.  - will make Dr. Flores aware - patient might need a cystoscopy.  Lab Results   Component Value Date    CREATININE 0.9 02/21/2018     Protein Creatinine Ratios: will check    No results found for: UTPCR  ·   ·   Acid-Base:   Lab Results   Component Value Date     02/21/2018    K 4.3 02/21/2018    CO2 29 02/21/2018     2. HTN: Blood pressures well controlled.      3. Renal osteodystrophy: last PTH   Lab Results   Component Value Date    CALCIUM 9.8 02/21/2018     4. DM:  Last HbA1C No results found for: HGBA1C    5. Lipid management:   Lab Results   Component Value Date    LDLCALC 125.4 02/21/2018     Will leave message on phone for his lab results    Follow up in 6 month with labs

## 2018-03-08 NOTE — LETTER
March 8, 2018      Renato Flores MD  [address]  [place] [address]           [facility] - Nephrology  [address]  [facility] [address]  Phone: [phone]  Fax: [phone]          Patient: Ajit Ayoub   MR Number: [id number]   YOB: 1940   Date of Visit: 3/8/2018       Dear Dr. Renato Flores:    Thank you for referring Ajit Ayoub to me for evaluation. Attached you will find relevant portions of my assessment and plan of care.    If you have questions, please do not hesitate to call me. I look forward to following Ajit Ayoub along with you.    Sincerely,    Alfa Ramirez MD    Enclosure  CC:  No Recipients    If you would like to receive this communication electronically, please contact [email or web address] or [phone] to request more information on [employer] Link access.    For providers and/or their staff who would like to refer a patient to [facility], please contact us through our one-stop-shop provider referral line, [facility], at [phone].    If you feel you have received this communication in error or would no longer like to receive these types of communications, please e-mail [email or web address]

## 2018-03-09 ENCOUNTER — TELEPHONE (OUTPATIENT)
Dept: UROLOGY | Facility: CLINIC | Age: 78
End: 2018-03-09

## 2018-03-09 DIAGNOSIS — R31.0 HEMATURIA, GROSS: Primary | ICD-10-CM

## 2018-03-09 RX ORDER — CIPROFLOXACIN 250 MG/1
500 TABLET, FILM COATED ORAL ONCE
Status: CANCELLED | OUTPATIENT
Start: 2018-03-09 | End: 2018-03-09

## 2018-03-09 RX ORDER — LIDOCAINE HYDROCHLORIDE 20 MG/ML
JELLY TOPICAL ONCE
Status: CANCELLED | OUTPATIENT
Start: 2018-03-09 | End: 2018-03-09

## 2018-03-09 NOTE — PROGRESS NOTES
Diagnoses and all orders for this visit:    Hematuria, gross  -     Cystoscopy; Future    Other orders  -     lidocaine HCl 2% urojet; Place into the urethra once.  -     ciprofloxacin HCl tablet 500 mg; Take 2 tablets (500 mg total) by mouth once.    per his nephrologist, abnormal RBCs seen on his UA.  Will schedule his cysto.

## 2018-03-09 NOTE — TELEPHONE ENCOUNTER
Offered to schedule cysto on 3.22.2018 at 815am. Patient states he can not come before 930am. He will check his calendar and call me back

## 2018-03-12 ENCOUNTER — LAB VISIT (OUTPATIENT)
Dept: LAB | Facility: HOSPITAL | Age: 78
End: 2018-03-12
Attending: INTERNAL MEDICINE
Payer: MEDICARE

## 2018-03-12 DIAGNOSIS — R73.9 BLOOD GLUCOSE ELEVATED: ICD-10-CM

## 2018-03-12 LAB
ESTIMATED AVG GLUCOSE: 117 MG/DL
HBA1C MFR BLD HPLC: 5.7 %

## 2018-03-12 PROCEDURE — 36415 COLL VENOUS BLD VENIPUNCTURE: CPT | Mod: PO

## 2018-03-12 PROCEDURE — 83036 HEMOGLOBIN GLYCOSYLATED A1C: CPT

## 2018-03-16 ENCOUNTER — TELEPHONE (OUTPATIENT)
Dept: INTERNAL MEDICINE | Facility: CLINIC | Age: 78
End: 2018-03-16

## 2018-03-16 RX ORDER — CIPROFLOXACIN 500 MG/1
500 TABLET ORAL 2 TIMES DAILY
Qty: 14 TABLET | Refills: 0 | Status: SHIPPED | OUTPATIENT
Start: 2018-03-16 | End: 2018-03-23

## 2018-03-16 NOTE — TELEPHONE ENCOUNTER
----- Message from Homer Sen DO sent at 3/16/2018  7:51 AM CDT -----  Repeat urine study done for hematuria shows PSEUDOMONAS AERUGINOSA UTI- I will send Cipro for treatment

## 2018-03-27 NOTE — TELEPHONE ENCOUNTER
Left message reminding him to schedule cystoscope. I also placed him on recall screen so that he will get a reminder letter to call

## 2018-04-10 ENCOUNTER — TELEPHONE (OUTPATIENT)
Dept: OPHTHALMOLOGY | Facility: CLINIC | Age: 78
End: 2018-04-10

## 2018-09-18 ENCOUNTER — OFFICE VISIT (OUTPATIENT)
Dept: UROLOGY | Facility: CLINIC | Age: 78
End: 2018-09-18
Payer: MEDICARE

## 2018-09-18 VITALS
BODY MASS INDEX: 30.01 KG/M2 | SYSTOLIC BLOOD PRESSURE: 162 MMHG | DIASTOLIC BLOOD PRESSURE: 99 MMHG | HEIGHT: 65 IN | HEART RATE: 86 BPM | WEIGHT: 180.13 LBS

## 2018-09-18 DIAGNOSIS — N13.8 BPH WITH URINARY OBSTRUCTION: Primary | ICD-10-CM

## 2018-09-18 DIAGNOSIS — N40.1 BPH WITH URINARY OBSTRUCTION: Primary | ICD-10-CM

## 2018-09-18 DIAGNOSIS — R10.2 PELVIC PAIN: ICD-10-CM

## 2018-09-18 DIAGNOSIS — R31.29 MICROSCOPIC HEMATURIA: ICD-10-CM

## 2018-09-18 DIAGNOSIS — N28.1 RENAL CYST, RIGHT: ICD-10-CM

## 2018-09-18 LAB
BILIRUB UR QL STRIP: NEGATIVE
CLARITY UR REFRACT.AUTO: CLEAR
COLOR UR AUTO: YELLOW
GLUCOSE UR QL STRIP: NEGATIVE
HGB UR QL STRIP: ABNORMAL
KETONES UR QL STRIP: NEGATIVE
LEUKOCYTE ESTERASE UR QL STRIP: ABNORMAL
MICROSCOPIC COMMENT: ABNORMAL
NITRITE UR QL STRIP: NEGATIVE
PH UR STRIP: 5 [PH] (ref 5–8)
PROT UR QL STRIP: NEGATIVE
RBC #/AREA URNS AUTO: 29 /HPF (ref 0–4)
SP GR UR STRIP: 1.02 (ref 1–1.03)
SQUAMOUS #/AREA URNS AUTO: 0 /HPF
URN SPEC COLLECT METH UR: ABNORMAL
UROBILINOGEN UR STRIP-ACNC: NEGATIVE EU/DL
WBC #/AREA URNS AUTO: 5 /HPF (ref 0–5)

## 2018-09-18 PROCEDURE — 3080F DIAST BP >= 90 MM HG: CPT | Mod: CPTII,,, | Performed by: NURSE PRACTITIONER

## 2018-09-18 PROCEDURE — 99215 OFFICE O/P EST HI 40 MIN: CPT | Mod: PBBFAC | Performed by: NURSE PRACTITIONER

## 2018-09-18 PROCEDURE — 1101F PT FALLS ASSESS-DOCD LE1/YR: CPT | Mod: CPTII,,, | Performed by: NURSE PRACTITIONER

## 2018-09-18 PROCEDURE — 99214 OFFICE O/P EST MOD 30 MIN: CPT | Mod: S$PBB,,, | Performed by: NURSE PRACTITIONER

## 2018-09-18 PROCEDURE — 81001 URINALYSIS AUTO W/SCOPE: CPT

## 2018-09-18 PROCEDURE — 88112 CYTOPATH CELL ENHANCE TECH: CPT | Performed by: PATHOLOGY

## 2018-09-18 PROCEDURE — 3077F SYST BP >= 140 MM HG: CPT | Mod: CPTII,,, | Performed by: NURSE PRACTITIONER

## 2018-09-18 PROCEDURE — 99999 PR PBB SHADOW E&M-EST. PATIENT-LVL V: CPT | Mod: PBBFAC,,, | Performed by: NURSE PRACTITIONER

## 2018-09-18 NOTE — PATIENT INSTRUCTIONS
BPH (Enlarged Prostate)  The prostate is a gland at the base of the bladder. As some men get older, the prostate may get bigger in size. This problem is called benign prostatic hyperplasia (BPH). BPH puts pressure on the urethra. This is the tube that carries urine from the bladder to the penis. It may interfere with the flow of urine. It may also keep the bladder from emptying fully.    Symptoms of BPH include trouble starting urination and feeling as though the bladder isnt emptying all the way. It also includes a weak urine stream, dribbling and leaking of urine, and frequent and urgent urination (especially at night). BPH can increase the risk of urinary infections. It can also block off urine flow completely. If this occurs, a thin tube (catheter) may be passed into the bladder to help drain urine.  If symptoms are mild, no treatment may be needed right now. If symptoms are more severe, treatment is likely needed. The goal of treatment is to improve urine flow and reduce symptoms. Treatments can include medicine and procedures. Your healthcare provider will discuss treatment options with you as needed.  Home care  The following guidelines will help you care for yourself at home:  · Urinate as soon as you feel the urge. Don't try to hold your urine.  · Don't limit your fluid intake during the day. Drink 6 to 8 glasses of water or liquids a day. This prevents bacteria from building up in the bladder.  · Avoid drinking fluids after dinner to help reduce urination during the night.  · Avoid medicines that can worsen your symptoms. These include certain cold and allergy medicines and antidepressants. Diuretics used for high blood pressure can also worsen symptoms. Talk to your doctor about the medicines you take. Other choices may work better for you.  Prostate cancer screening  BPH does not increase the risk of prostate cancer. But because prostate cancer is a common cancer in men, screening is sometimes  recommended. This may help detect the cancer in its early stages when treatment is most effective. Factors that can increase the risk of prostate cancer include being -American or having a father or brother who had prostate cancer. A high-fat diet may also increase the risk of prostate cancer. Talk to your healthcare provider to see whether you should be screened for prostate cancer.  Follow-up care  Follow up with your healthcare provider, or as advised  To learn more, go to:  · National Kidney & Urologic Diseases Information Clearinghouse  kidney.niddk.nih.gov, 814.145.9254  When to seek medical advice  Call your healthcare provider right away if any of these occur:  · Fever of 100.4°F (38.0°C) or higher, or as advised  · Unable to pass urine for 8 hours  · Increasing pressure or pain in your bladder (lower abdomen)  · Blood in the urine  · Increasing low back pain, not related to injury  · Symptoms of urinary infection (increased urge to urinate, burning when passing urine, foul-smelling urine)  Date Last Reviewed: 7/1/2016  © 8782-4396 [employer]. [address] [place] [address]. All rights reserved. This information is not intended as a substitute for professional medical care. Always follow your healthcare professional's instructions.        Prostate Problems and Related Urinary Symptoms    Many men have problems with the prostate at some time in their lives. The prostate gland is part of the male reproductive system. Its located just below the bladder. The prostate surrounds the urethra (the tube that carries urine and semen out of the body). The main function of the prostate gland is to add fluid to the semen. When problems occur in the prostate, the bladder and urethra are often affected as well. The most common prostate problems are described below.  BPH  BPH (benign prostatic hyperplasia) develops when changing hormone levels cause the prostate to grow larger. This often  starts around age 50. Excess tissue can block the urethra, making it harder for urine to flow. The enlarged prostate can also press on the bladder, so you may need to urinate more often. Other symptoms include straining during urination, a weak urine stream, urinating more at night, incontinence, dribbling at the end of urination, and feeling that the bladder isnt emptying all the way. Note that BPH is not cancer and does not cause cancer.  How BPH affects the bladder  Pushing to urinate through a narrowed urethra can cause the bladder walls to thicken or stretch out of shape. A stretched bladder may have problems emptying all the way. If urine stays in the bladder longer than it should, you may develop an infection or bladder stones. Also, the kidneys cant drain properly into a bladder that doesnt empty completely. This can lead to kidney failure. Pressure from urine buildup can also cause leaking of urine (called overflow incontinence).  Other prostate problems  · Prostatitis is an infection or inflammation that causes the prostate to become painful and swollen. The swelling narrows the urethra and can block the bladder neck. Prostatitis can cause a burning sensation during urination. You may also feel pressure or pain in the genital area. In some cases, prostatitis can cause fever and chills, and can make you very sick.  · Cancer occurs when abnormal cells form a tumor (a lump of cells that grow uncontrolled). Some tumors can be felt during a physical exam, others cant. Prostate cancer often causes no symptoms at all, especially in its early stages. Prostate symptoms are more likely to be caused by a problem that is NOT cancer.   Date Last Reviewed: 1/1/2017 © 2000-2017 [employer]. [address] [place] [address], [place] [address]. All rights reserved. This information is not intended as a substitute for professional medical care. Always follow your healthcare professional's  instructions.        Chronic Prostatitis/Chronic Pelvic Pain Syndrome (CP/CPPS)      With a healthy prostate, urine flows easily through the urethra.     Chronic prostatitis/chronic pelvic pain syndrome (CP/CPPS) is ongoing pain in the area of the prostate gland. CP/CPPS is the most common of the 4 types of prostatitis which also include acute bacterial prostatitis, chronic bacterial prostatitis, and asymptomatic inflammatory prostatitis. The prostate gland is part of the male reproductive system. It sits just below the bladder and surrounds the urethra. The urethra is the tube that takes urine and semen out of the body. CP/CPPS is the most common form of pain of the gland. It is also known as nonbacterial prostatitis. Symptoms such as pain and trouble urinating may come and go.  What causes CP/CPPS?  The exact cause of CP/CPPS isnt known. It may be caused by an infection that comes back again and again. It may be caused by inflammation of the gland. Muscle spasms in the pelvis may be a cause. Other causes of CP/CPPS may include:  · Stress that tightens the pelvic muscles  · Urine flowing back up into the prostate ducts  · Not ejaculating often  In many cases, the cause isnt clear.  What are the symptoms of CP/CPPS?  Some men dont have symptoms. Or, they may have symptoms that come and go. The symptoms can include:  · Pain in the genitals and pelvic area  · Trouble urinating  · Pain while urinating  · Pain during or after ejaculation  How is CP/CPPS diagnosed?  Your healthcare provider will ask about your medical history and your symptoms. He or she may give you a physical exam, including a rectal exam. Your urine, blood, and semen may be tested for bacteria or certain chemicals. In some cases, you may have other tests. You may have an ultrasound or a transrectal ultrasound-guided biopsy. This is done to take tiny pieces of tissue to look at with a microscope. Or, you may have imaging tests such as a CT scan,  MRI, or urodynamic studies that look at urine flow and other issues. These are done to look at your abdomen and pelvic areas.  How is CP/CPPS treated?  The goal of treatment is to help relieve symptoms. Treatments can include one or more of these:  · Antibiotics  · Anti-inflammatory or muscle-relaxing medicines  · Alpha-blocker medicines, which relax the muscles in and around the gland  · Sitz baths  · Prostate massage  · Dietary changes  · Biofeedback  · Surgery  · Other medicines or herbal treatments  Date Last Reviewed: 1/1/2017 © 2000-2017 [employer]. [address] [place] [address]. All rights reserved. This information is not intended as a substitute for professional medical care. Always follow your healthcare professional's instructions.        Finasteride (Proscar) tablets  What is this medicine?  FINASTERIDE (fi GAEL kelly jack) is used to treat benign prostatic hyperplasia (BPH) in men. This is a condition that causes you to have an enlarged prostate. This medicine helps to control your symptoms, decrease urinary retention, and reduces your risk of needing surgery. When used in combination with certain other medicines, this drug can slow down the progression of your disease.  How should I use this medicine?  Take this medicine by mouth with a glass of water. Follow the directions on the prescription label. You can take this medicine with or without food. Take your doses at regular intervals. Do not take your medicine more often than directed. Do not stop taking except on the advice of your doctor or health care professional.  Talk to your pediatrician regarding the use of this medicine in children. Special care may be needed.  What side effects may I notice from receiving this medicine?  Side effects that you should report to your doctor or health care professional as soon as possible:  · any signs of an allergic reaction like rash, itching, hives or swelling of the lips or  face  · changes in breast like lumps, pain or fluids leaking from the nipple  · pain in the testicles  Side effects that usually do not require medical attention (report to your doctor or health care professional if they continue or are bothersome):  · sexual difficulties like decreased sexual desire or ability to get an erection  · small amount of semen released during sex  What may interact with this medicine?  · saw palmetto or other dietary supplements  What if I miss a dose?  If you miss a dose, take it as soon as you can. If it is almost time for your next dose, take only that dose. Do not take double or extra doses.  Where should I keep my medicine?  Keep out of the reach of children.  Store at room temperature below 30 degrees C (86 degrees F). Protect from light. Keep container tightly closed. Throw away any unused medicine after the expiration date.  What should I tell my health care provider before I take this medicine?  They need to know if you have any of these conditions:  · liver disease  · an unusual or allergic reaction to finasteride, other medicines, foods, dyes, or preservatives  · pregnant or trying to get pregnant  · breast-feeding  What should I watch for while using this medicine?  Do not donate blood while you are taking this medicine. This will prevent giving this medicine to a pregnant female through a blood transfusion. Ask your doctor or health care professional when it is safe to donate blood after you stop taking this medicine.  Women who are pregnant or may get pregnant must not handle broken or crushed finasteride tablets. The active ingredient could harm the unborn baby. If a pregnant woman comes into contact with broken or crushed tablets she should check with her doctor or health care professional. Exposure to whole tablets is not expected to cause harm as long as they are not swallowed.  Contact your doctor or health care professional if your symptoms do not start to get better.  You may need to take this medicine for 6 to 12 months to get the best results.  This medicine can interfere with PSA laboratory tests for prostate cancer. If you are scheduled to have a lab test for prostate cancer, tell your doctor or health care professional that you are taking this medicine.  This medicine may increase your risk of getting some cancers, like breast cancer. Talk with your doctor.  NOTE:This sheet is a summary. It may not cover all possible information. If you have questions about this medicine, talk to your doctor, pharmacist, or health care provider. Copyright© 2017 [employer]

## 2018-09-18 NOTE — PROGRESS NOTES
Subjective:       Patient ID: Ajit Ayoub is a 78 y.o. male.    Chief Complaint: Prostatitis    Ajit Ayoub is a 78 y.o. Male with history of BPH and prostatitis.  History of TURP in 2007.  His TURP he has been taking regular Bactrim (400mg) daily.  He was last seen in clinic with Dr. Flores 02/26/2018; bactrim was refilled.  He was to have a cysto for microscopic hematuria but refused.  He states had before and did not want it again  He has a lot of other pressing issues; allergic rashes.   He never sees blood when urinating.    He here today due to possible prostate infection.  He reports that he gets really stressed and then feels some pelvic discomfort.  Sometimes when stressed difficulty getting urine started.    Today though he does not have those symptoms.  He very concerned about a cyst on his kidney; no flank pain  He states that he has tried Flomax in the past and did not like; feels he allergic to it.                           PSA                      2.1                 02/21/2018                    Past Medical History:  No date: Chronic prostatitis  No date: Hyperthyroidism  No date: Obesity (BMI 30-39.9)  No date: Vitamin D insufficiency    Past Surgical History:  No date: TRANSURETHRAL RESECTION OF PROSTATE    Review of patient's family history indicates:  Problem: Diabetes      Relation: Mother          Age of Onset: (Not Specified)  Problem: Heart disease      Relation: Mother          Age of Onset: (Not Specified)  Problem: Diabetes      Relation: Father          Age of Onset: (Not Specified)  Problem: Heart disease      Relation: Father          Age of Onset: (Not Specified)  Problem: Cancer      Relation: Neg Hx          Age of Onset: (Not Specified)  Problem: Stroke      Relation: Neg Hx          Age of Onset: (Not Specified)      Social History    Socioeconomic History      Marital status: Single      Spouse name: Not on file      Number of children: Not on file      Years of education:  Not on file      Highest education level: Not on file    Social Needs      Financial resource strain: Not on file      Food insecurity - worry: Not on file      Food insecurity - inability: Not on file      Transportation needs - medical: Not on file      Transportation needs - non-medical: Not on file    Occupational History      Occupation: Retired      Tobacco Use      Smoking status: Never Smoker      Smokeless tobacco: Never Used    Substance and Sexual Activity      Alcohol use: No      Drug use: No      Sexual activity: Not Currently        Partners: Female    Other Topics      Concerns:        Not on file    Social History Narrative      Not on file      Allergies:  Contrast media and Pneumoc 13-lori conj-dip cr(pf)    Medications:  Current Outpatient Medications:   ergocalciferol (ERGOCALCIFEROL) 50,000 unit Cap, , Disp: , Rfl:   ibuprofen (ADVIL,MOTRIN) 600 MG tablet, , Disp: , Rfl:   lisinopril 10 MG tablet, Take 10 mg by mouth once daily. , Disp: , Rfl:   LUMIGAN 0.01 % Drop, , Disp: , Rfl:   sulfamethoxazole-trimethoprim 400-80mg (BACTRIM) 400-80 mg per tablet, Take 1 tablet by mouth once daily., Disp: 90 tablet, Rfl: 3  sulfamethoxazole-trimethoprim 800-160mg (BACTRIM DS) 800-160 mg Tab, , Disp: , Rfl:         Review of Systems   Constitutional: Negative for activity change, appetite change, chills and fever.   HENT: Negative for facial swelling and trouble swallowing.    Eyes: Negative for visual disturbance.   Respiratory: Negative for chest tightness and shortness of breath.    Cardiovascular: Negative for chest pain and palpitations.   Gastrointestinal: Negative.  Negative for abdominal pain, constipation, diarrhea, nausea and vomiting.   Genitourinary: Negative for difficulty urinating, dysuria, flank pain, hematuria, penile pain, penile swelling, scrotal swelling and testicular pain.        Today FOS is good.  Nocturia 0-1x     Musculoskeletal: Negative for back pain, gait  problem, myalgias and neck stiffness.   Skin: Positive for rash.   Neurological: Negative for dizziness and speech difficulty.   Hematological: Does not bruise/bleed easily.   Psychiatric/Behavioral: Negative for behavioral problems. The patient is nervous/anxious.        Objective:      Physical Exam   Nursing note and vitals reviewed.  Constitutional: He is oriented to person, place, and time. He appears well-developed and well-nourished. He is active and cooperative.  Non-toxic appearance. He does not have a sickly appearance.   Urine dipped clear of infection.   Microscopic hematuria.     HENT:   Head: Normocephalic and atraumatic.   Right Ear: External ear normal.   Left Ear: External ear normal.   Nose: Nose normal.   Mouth/Throat: Mucous membranes are normal.   Eyes: Conjunctivae and lids are normal. No scleral icterus.   Neck: Trachea normal, normal range of motion and full passive range of motion without pain. Neck supple. No JVD present. No tracheal deviation present.   Cardiovascular: Normal rate, S1 normal and S2 normal.    Pulmonary/Chest: Effort normal. No respiratory distress. He exhibits no tenderness.   Abdominal: Soft. Normal appearance and bowel sounds are normal. There is no hepatosplenomegaly. There is no tenderness. There is no CVA tenderness.   Genitourinary: Rectum normal, testes normal and penis normal. Right testis shows no mass, no swelling and no tenderness. Left testis shows no mass, no swelling and no tenderness. Uncircumcised. No phimosis, paraphimosis, hypospadias, penile erythema or penile tenderness. No discharge found.       Musculoskeletal: Normal range of motion.   Lymphadenopathy: No inguinal adenopathy noted on the right or left side.   Neurological: He is alert and oriented to person, place, and time. He has normal strength.   Skin: Skin is warm, dry and intact. Lesion and rash noted. There is erythema.     (+) erythema to his arms.     Psychiatric: He has a normal mood and  affect. His behavior is normal. Judgment and thought content normal.       Assessment:       1. BPH with urinary obstruction    2. Renal cyst, right    3. Pelvic pain    4. Microscopic hematuria        Plan:         I spent 25 minutes with the patient of which more than half was spent in direct consultation with the patient in regards to our treatment and plan.    Education and recommendations of today's plan of care including home remedies.  We discussed his LUTS and possible contributory factors.  Discussed LUTS not always due to infection.  Preventive measures discussed.  We discussed finasteride since unable to take Flomax; benefits risks se.  Repeat renal u/s.   Discussed why Dr. Flores wanted to have cysto done; voiced understanding; he ok without it being done  We did collect urine today for cytology.

## 2018-09-19 NOTE — TELEPHONE ENCOUNTER
New patient saw you first visit feb 2018.  he is out of refills from old dr and needs approved by you.    I  Verified and loaded 6 mos renewal to get him to next appt due.  I told him  will approve Thursday am to pharmacy.  Pharmacy verified.    Thanks arlen

## 2018-09-19 NOTE — TELEPHONE ENCOUNTER
"----- Message from Daija Vivar sent at 9/19/2018  3:03 PM CDT -----  Contact: [phone]  RX request - refill or new RX.  Is this a refill or new RX:  New  RX name and strength: lisinopril 10 MG tablet  Directions:   Is this a 30 day or 90 day RX:    Local pharmacy or mail order pharmacy:    Pharmacy name and phone # (DON'T enter "on file" or "in chart"): [email or web address] [id number] AT [facility] OF [address] [phone] (Phone)  [phone] (Fax)      Comments:        "

## 2018-09-20 ENCOUNTER — TELEPHONE (OUTPATIENT)
Dept: UROLOGY | Facility: CLINIC | Age: 78
End: 2018-09-20

## 2018-09-20 ENCOUNTER — HOSPITAL ENCOUNTER (OUTPATIENT)
Dept: RADIOLOGY | Facility: HOSPITAL | Age: 78
Discharge: HOME | End: 2018-09-20
Attending: NURSE PRACTITIONER
Payer: MEDICARE

## 2018-09-20 DIAGNOSIS — N28.1 RENAL CYST, RIGHT: ICD-10-CM

## 2018-09-20 DIAGNOSIS — N40.1 BPH WITH URINARY OBSTRUCTION: ICD-10-CM

## 2018-09-20 DIAGNOSIS — R10.2 PELVIC PAIN: ICD-10-CM

## 2018-09-20 DIAGNOSIS — N13.8 BPH WITH URINARY OBSTRUCTION: ICD-10-CM

## 2018-09-20 PROCEDURE — 76770 US EXAM ABDO BACK WALL COMP: CPT | Mod: 26,,, | Performed by: RADIOLOGY

## 2018-09-20 PROCEDURE — 76770 US EXAM ABDO BACK WALL COMP: CPT | Mod: TC

## 2018-09-20 RX ORDER — LISINOPRIL 10 MG/1
10 TABLET ORAL DAILY
Qty: 90 TABLET | Refills: 1 | Status: SHIPPED | OUTPATIENT
Start: 2018-09-20 | End: 2019-03-12 | Stop reason: ALTCHOICE

## 2018-09-20 NOTE — TELEPHONE ENCOUNTER
Patient notified that u/s did not show anything unusual.   Renal cysts present but nothing to worry about.   Small right kidney stone.   He sees Dr. Flores in November.

## 2018-11-12 ENCOUNTER — OFFICE VISIT (OUTPATIENT)
Dept: UROLOGY | Facility: CLINIC | Age: 78
End: 2018-11-12
Payer: MEDICARE

## 2018-11-12 ENCOUNTER — HOSPITAL ENCOUNTER (OUTPATIENT)
Dept: RADIOLOGY | Facility: HOSPITAL | Age: 78
Discharge: HOME | End: 2018-11-12
Attending: UROLOGY
Payer: MEDICARE

## 2018-11-12 VITALS
BODY MASS INDEX: 29.34 KG/M2 | WEIGHT: 176.13 LBS | HEART RATE: 73 BPM | SYSTOLIC BLOOD PRESSURE: 131 MMHG | HEIGHT: 65 IN | DIASTOLIC BLOOD PRESSURE: 81 MMHG

## 2018-11-12 DIAGNOSIS — N20.0 KIDNEY STONE: ICD-10-CM

## 2018-11-12 DIAGNOSIS — Z90.79 S/P TURP: Primary | ICD-10-CM

## 2018-11-12 DIAGNOSIS — F41.9 ANXIETY: ICD-10-CM

## 2018-11-12 DIAGNOSIS — R31.29 HEMATURIA, MICROSCOPIC: ICD-10-CM

## 2018-11-12 DIAGNOSIS — N41.1 CHRONIC PROSTATITIS: ICD-10-CM

## 2018-11-12 PROCEDURE — 3075F SYST BP GE 130 - 139MM HG: CPT | Mod: CPTII,HCNC,S$GLB, | Performed by: UROLOGY

## 2018-11-12 PROCEDURE — 74018 RADEX ABDOMEN 1 VIEW: CPT | Mod: 26,HCNC,, | Performed by: RADIOLOGY

## 2018-11-12 PROCEDURE — 99999 PR PBB SHADOW E&M-EST. PATIENT-LVL III: CPT | Mod: PBBFAC,,, | Performed by: UROLOGY

## 2018-11-12 PROCEDURE — 99215 OFFICE O/P EST HI 40 MIN: CPT | Mod: HCNC,25,S$GLB, | Performed by: UROLOGY

## 2018-11-12 PROCEDURE — 3079F DIAST BP 80-89 MM HG: CPT | Mod: CPTII,HCNC,S$GLB, | Performed by: UROLOGY

## 2018-11-12 PROCEDURE — 1101F PT FALLS ASSESS-DOCD LE1/YR: CPT | Mod: CPTII,HCNC,S$GLB, | Performed by: UROLOGY

## 2018-11-12 PROCEDURE — 81002 URINALYSIS NONAUTO W/O SCOPE: CPT | Mod: S$GLB,,, | Performed by: UROLOGY

## 2018-11-12 PROCEDURE — 74018 RADEX ABDOMEN 1 VIEW: CPT | Mod: TC,HCNC,PO

## 2018-11-12 RX ORDER — LIDOCAINE HYDROCHLORIDE 20 MG/ML
JELLY TOPICAL ONCE
Status: CANCELLED | OUTPATIENT
Start: 2018-11-12 | End: 2018-11-12

## 2018-11-12 RX ORDER — ALPRAZOLAM 0.25 MG/1
0.25 TABLET ORAL 2 TIMES DAILY PRN
Qty: 60 TABLET | Refills: 0 | Status: SHIPPED | OUTPATIENT
Start: 2018-11-12 | End: 2019-03-12 | Stop reason: SDUPTHER

## 2018-11-12 RX ORDER — CIPROFLOXACIN 250 MG/1
500 TABLET, FILM COATED ORAL ONCE
Status: CANCELLED | OUTPATIENT
Start: 2018-11-12 | End: 2018-11-12

## 2018-11-12 NOTE — PROGRESS NOTES
CC: anxiety and stress affecting his LUTS, hx of MH    Subjective:       Patient ID: Ajit Ayoub is a 78 y.o. male.    Chief Complaint: Follow-up (6 mths follow up)    Ajit Ayoub is a 78 y.o. Male with history of MH, BPH and prostatitis.  Recently seen by Jaye Roberts.  C/o increased frequency, urgency, and prostate pain, especially when he gets stressed out or anxious.  He would like to get something to control his anxiety and stress.    History of TURP in 2007.  His TURP he has been taking regular Bactrim (400mg) daily.  He was last seen in clinic with Dr. Flores 02/26/2018; bactrim was refilled.  He was to have a cysto for microscopic hematuria but refused.  He states had before and did not want it again  He has a lot of other pressing issues; allergic rashes.   He never sees blood when urinating.    He here today due to possible prostate infection.  He reports that he gets really stressed and then feels some pelvic discomfort.  Sometimes when stressed difficulty getting urine started.    Today though he does not have those symptoms.  He very concerned about a cyst on his kidney; no flank pain  He states that he has tried Flomax in the past and did not like; feels he allergic to it.                           PSA                      2.1                 02/21/2018                    Past Medical History:  No date: Chronic prostatitis  No date: Hyperthyroidism  No date: Obesity (BMI 30-39.9)  No date: Vitamin D insufficiency    Past Surgical History:  No date: TRANSURETHRAL RESECTION OF PROSTATE    Review of patient's family history indicates:  Problem: Diabetes      Relation: Mother          Age of Onset: (Not Specified)  Problem: Heart disease      Relation: Mother          Age of Onset: (Not Specified)  Problem: Diabetes      Relation: Father          Age of Onset: (Not Specified)  Problem: Heart disease      Relation: Father          Age of Onset: (Not Specified)  Problem: Cancer      Relation: Neg  Hx          Age of Onset: (Not Specified)  Problem: Stroke      Relation: Neg Hx          Age of Onset: (Not Specified)      Social History    Socioeconomic History      Marital status: Single      Spouse name: Not on file      Number of children: Not on file      Years of education: Not on file      Highest education level: Not on file    Social Needs      Financial resource strain: Not on file      Food insecurity - worry: Not on file      Food insecurity - inability: Not on file      Transportation needs - medical: Not on file      Transportation needs - non-medical: Not on file    Occupational History      Occupation: Retired      Tobacco Use      Smoking status: Never Smoker      Smokeless tobacco: Never Used    Substance and Sexual Activity      Alcohol use: No      Drug use: No      Sexual activity: Not Currently        Partners: Female    Other Topics      Concerns:        Not on file    Social History Narrative      Not on file      Allergies:  Contrast media and Pneumoc 13-lori conj-dip cr(pf)    Medications:  Current Outpatient Medications:   ergocalciferol (ERGOCALCIFEROL) 50,000 unit Cap, , Disp: , Rfl:   ibuprofen (ADVIL,MOTRIN) 600 MG tablet, , Disp: , Rfl:   lisinopril 10 MG tablet, Take 10 mg by mouth once daily. , Disp: , Rfl:   LUMIGAN 0.01 % Drop, , Disp: , Rfl:   sulfamethoxazole-trimethoprim 400-80mg (BACTRIM) 400-80 mg per tablet, Take 1 tablet by mouth once daily., Disp: 90 tablet, Rfl: 3  sulfamethoxazole-trimethoprim 800-160mg (BACTRIM DS) 800-160 mg Tab, , Disp: , Rfl:         Review of Systems   Constitutional: Negative for activity change, appetite change, chills and fever.   HENT: Negative for facial swelling and trouble swallowing.    Eyes: Negative for visual disturbance.   Respiratory: Negative for chest tightness and shortness of breath.    Cardiovascular: Negative for chest pain and palpitations.   Gastrointestinal: Negative.  Negative for abdominal pain,  constipation, diarrhea, nausea and vomiting.   Genitourinary: Negative for difficulty urinating, dysuria, flank pain, hematuria, penile pain, penile swelling, scrotal swelling and testicular pain.        Today FOS is good.  Nocturia 0-1x     Musculoskeletal: Negative for back pain, gait problem, myalgias and neck stiffness.   Skin: Positive for rash.   Neurological: Negative for dizziness and speech difficulty.   Hematological: Does not bruise/bleed easily.   Psychiatric/Behavioral: Negative for behavioral problems. The patient is nervous/anxious.        Objective:      Physical Exam   Nursing note and vitals reviewed.  Constitutional: He is oriented to person, place, and time. He appears well-developed and well-nourished. He is active and cooperative.  Non-toxic appearance. He does not have a sickly appearance.   Urine dipped clear of infection.   Microscopic hematuria.     HENT:   Head: Normocephalic and atraumatic.   Right Ear: External ear normal.   Left Ear: External ear normal.   Nose: Nose normal.   Mouth/Throat: Mucous membranes are normal.   Eyes: Conjunctivae and lids are normal. No scleral icterus.   Neck: Trachea normal, normal range of motion and full passive range of motion without pain. Neck supple. No JVD present. No tracheal deviation present.   Cardiovascular: Normal rate, S1 normal and S2 normal.    Pulmonary/Chest: Effort normal. No respiratory distress. He exhibits no tenderness.   Abdominal: Soft. Normal appearance and bowel sounds are normal. There is no hepatosplenomegaly. There is no tenderness. There is no CVA tenderness.   Genitourinary: Rectum normal, testes normal and penis normal. Right testis shows no mass, no swelling and no tenderness. Left testis shows no mass, no swelling and no tenderness. Uncircumcised. No phimosis, paraphimosis, hypospadias, penile erythema or penile tenderness. No discharge found.       Musculoskeletal: Normal range of motion.   Lymphadenopathy: No inguinal  adenopathy noted on the right or left side.   Neurological: He is alert and oriented to person, place, and time. He has normal strength.   Skin: Skin is warm, dry and intact. Lesion and rash noted. There is erythema.     (+) erythema to his arms.     Psychiatric: He has a normal mood and affect. His behavior is normal. Judgment and thought content normal.         US 9/20/18  Sonographic findings consistent with medical renal disease.    Bilateral renal simple and minimally complex cysts.    Right renal nonobstructing nephrolith.    UA 50 + blood    Assessment:       S/P TURP    Chronic prostatitis  -     POCT urine dipstick without microscope    Anxiety  -     ALPRAZolam (XANAX) 0.25 MG tablet; Take 1 tablet (0.25 mg total) by mouth 2 (two) times daily as needed for Anxiety.  Dispense: 60 tablet; Refill: 0    Hematuria, microscopic  -     Cystoscopy; Future  -     POCT urine dipstick without microscope    Kidney stone  -     X-Ray Abdomen AP 1 View; Future; Expected date: 11/12/2018    Other orders  -     lidocaine HCl 2% urojet  -     ciprofloxacin HCl tablet 500 mg      Plan:           We discussed his LUTS and possible contributory factors.  Discussed LUTS not always due to infection.  Preventive measures discussed.  He would like to try something for his anxiety.  Xanax given.  If he desires, he can get future refills from his PCP.    KUB to establish his baseline in terms of his kidney stone.  Recommend cysto to r/o other pathology.  He can take xanax before cysto.    He needs to retract his foreskin and keep it clean.    I spent 40 minutes with the patient of which more than half was spent in direct consultation with the patient in regards to our treatment and plan.      Follow up:  Follow-up for cysto.

## 2018-11-15 DIAGNOSIS — N47.1 PHIMOSIS: ICD-10-CM

## 2018-11-15 NOTE — PROGRESS NOTES
Unable to do cysto today due to severe phimosis.  Please have him follow up with me in Jan. 2019 to discuss his surgery.  Phimosis    will plan cysto and circumcision under anesthesia.

## 2018-11-20 ENCOUNTER — TELEPHONE (OUTPATIENT)
Dept: UROLOGY | Facility: CLINIC | Age: 78
End: 2018-11-20

## 2018-11-20 ENCOUNTER — HOSPITAL ENCOUNTER (EMERGENCY)
Facility: HOSPITAL | Age: 78
Discharge: HOME | End: 2018-11-20
Attending: EMERGENCY MEDICINE
Payer: MEDICARE

## 2018-11-20 VITALS
WEIGHT: 172 LBS | BODY MASS INDEX: 28.66 KG/M2 | OXYGEN SATURATION: 97 % | RESPIRATION RATE: 18 BRPM | TEMPERATURE: 98 F | HEART RATE: 95 BPM | SYSTOLIC BLOOD PRESSURE: 153 MMHG | DIASTOLIC BLOOD PRESSURE: 83 MMHG | HEIGHT: 65 IN

## 2018-11-20 DIAGNOSIS — R31.29 HEMATURIA, MICROSCOPIC: ICD-10-CM

## 2018-11-20 DIAGNOSIS — N47.1 PHIMOSIS: Primary | ICD-10-CM

## 2018-11-20 DIAGNOSIS — N48.1 BALANITIS: ICD-10-CM

## 2018-11-20 LAB
BILIRUB UR QL STRIP: NEGATIVE
CLARITY UR REFRACT.AUTO: ABNORMAL
COLOR UR AUTO: YELLOW
GLUCOSE UR QL STRIP: NEGATIVE
HGB UR QL STRIP: ABNORMAL
KETONES UR QL STRIP: ABNORMAL
LEUKOCYTE ESTERASE UR QL STRIP: ABNORMAL
MICROSCOPIC COMMENT: ABNORMAL
NITRITE UR QL STRIP: NEGATIVE
PH UR STRIP: 5 [PH] (ref 5–8)
PROT UR QL STRIP: NEGATIVE
RBC #/AREA URNS AUTO: 7 /HPF (ref 0–4)
SP GR UR STRIP: 1.02 (ref 1–1.03)
SQUAMOUS #/AREA URNS AUTO: 1 /HPF
URN SPEC COLLECT METH UR: ABNORMAL
WBC #/AREA URNS AUTO: 3 /HPF (ref 0–5)

## 2018-11-20 PROCEDURE — 99284 EMERGENCY DEPT VISIT MOD MDM: CPT | Mod: HCNC,,, | Performed by: PHYSICIAN ASSISTANT

## 2018-11-20 PROCEDURE — 99284 EMERGENCY DEPT VISIT MOD MDM: CPT | Mod: HCNC

## 2018-11-20 PROCEDURE — 81001 URINALYSIS AUTO W/SCOPE: CPT | Mod: HCNC

## 2018-11-20 RX ORDER — NYSTATIN 100000 [USP'U]/G
POWDER TOPICAL 4 TIMES DAILY
Qty: 60 G | Refills: 0 | Status: SHIPPED | OUTPATIENT
Start: 2018-11-20 | End: 2019-03-12 | Stop reason: ALTCHOICE

## 2018-11-20 RX ORDER — CEPHALEXIN 500 MG/1
500 CAPSULE ORAL EVERY 12 HOURS
Qty: 20 CAPSULE | Refills: 0 | Status: SHIPPED | OUTPATIENT
Start: 2018-11-20 | End: 2018-11-25

## 2018-11-20 RX ORDER — BACITRACIN ZINC 500 UNIT/G
OINTMENT (GRAM) TOPICAL 2 TIMES DAILY
Qty: 1 TUBE | Refills: 1 | Status: SHIPPED | OUTPATIENT
Start: 2018-11-20 | End: 2019-03-12 | Stop reason: ALTCHOICE

## 2018-11-20 NOTE — ED TRIAGE NOTES
Pt states he has had problems urination  States he was not curcumcised and has swelling with redness at the tip of his penis  States he is scheduled to have a cystoscope on Dec. 5, 2018

## 2018-11-20 NOTE — DISCHARGE SUMMARY
ILEANA Jackson went over discharge instructions with patient. Patient had no questions for provider. Patient left in independently in stable condition.

## 2018-11-20 NOTE — TELEPHONE ENCOUNTER
----- Message from Penelope Sousa LPN sent at 11/20/2018 10:19 AM CST -----      ----- Message -----  From: Greer Dugan MA  Sent: 11/20/2018  10:16 AM  To: Mark HYATT Staff    Pt would like a cream sent to his pharmacy to help with irritation to penis while he waits to have surgery on 12/5

## 2018-11-20 NOTE — DISCHARGE INSTRUCTIONS
Please follow up with Dr. Flores to schedule your circumcision. If you cannot empty your bladder, if you start to have fever, nausea/ vomiting or the penis becomes caught in the foreskin, please come back to the

## 2018-11-20 NOTE — ED NOTES
Pt identifiers Ajit Ayoub were checked and are correct  LOC: The patient is awake, alert, aware of environment with an appropriate affect. Oriented x4, speaking appropriately  APPEARANCE: Pt resting comfortably, in no acute distress, pt is clean and well groomed, clothing properly fastened  SKIN: Skin warm, dry and intact, poor skin turgor, moist mucus membranes  RESPIRATORY: Airway is open and patent, respirations are spontaneous, even and unlabored, normal effort and rate  CARDIAC: Normal rate and rhythm, no peripheral edema noted, capillary refill < 3 seconds, bilateral radial pulses 2+  ABDOMEN: Soft, nontender, nondistended.    NEUROLOGIC: facial expression is symmetrical, patient moving all extremities spontaneously, normal sensation in all extremities when touched with a finger.  Follows all commands appropriately  MUSCULOSKELETAL: No obvious deformities.

## 2018-11-20 NOTE — TELEPHONE ENCOUNTER
Phimosis  -     bacitracin 500 unit/gram Oint; Apply topically 2 (two) times daily.  Dispense: 1 Tube; Refill: 1    Balanitis  -     bacitracin 500 unit/gram Oint; Apply topically 2 (two) times daily.  Dispense: 1 Tube; Refill: 1    eRxed to the pharmacy.  Please call and advise the patient to take the medication as given.

## 2018-11-20 NOTE — ED PROVIDER NOTES
Encounter Date: 11/20/2018       History     Chief Complaint   Patient presents with    Male  Problem     dec 5 has circumcision sched, can't wait that long     78-year-old male with chronic prostatitis and phimosis presents for pain with urination x 2 days and requesting circumcision.  Patient was seen in urology clinic last week for phimosis, he declined circumcision at that time requesting that the surgery performed after the Thanksgiving holiday.  Over the past several days, patient states that he started to have dysuria, dribbling of urine and irritation of the foreskin.  He is able to empty his bladder. Patient seen at urgent care clinic, they advised him to come to the ED.  Patient denies penile discharge, fevers, abdominal pain, nausea/vomiting, back pain.          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   Respiratory: Negative for cough and shortness of breath.    Cardiovascular: Negative for chest pain and palpitations.   Gastrointestinal: Negative for abdominal pain, nausea and vomiting.   Genitourinary: Positive for dysuria, enuresis and penile pain. Negative for decreased urine volume, difficulty urinating, discharge, flank  pain, frequency, genital sores, hematuria, penile swelling, scrotal swelling, testicular pain and urgency.   Musculoskeletal: Negative for back pain and myalgias.   Skin: Negative for pallor and wound.   Allergic/Immunologic: Negative for immunocompromised state.   Neurological: Negative for light-headedness and headaches.   Psychiatric/Behavioral: Negative for confusion.       Physical Exam     Initial Vitals [11/20/18 1152]   BP Pulse Resp Temp SpO2   (!) 150/86 96 16 98.3 °F (36.8 °C) 96 %      MAP       --         Physical Exam    Nursing note and vitals reviewed.  Constitutional: He appears well-developed and well-nourished. He is not diaphoretic. No distress.   HENT:   Head: Normocephalic and atraumatic.   Eyes: EOM are normal. Pupils are equal, round, and reactive to light.   Neck: Normal range of motion. Neck supple.   Cardiovascular: Normal rate, regular rhythm, normal heart sounds and intact distal pulses. Exam reveals no gallop and no friction rub.    No murmur heard.  Pulmonary/Chest: Breath sounds normal.   Abdominal: Soft. Bowel sounds are normal. He exhibits no distension. There is no tenderness.   Genitourinary: Testes normal. Right testis shows no mass, no swelling and no tenderness. Left testis shows no mass, no swelling and no tenderness. Uncircumcised. Phimosis, penile erythema and penile tenderness present. No paraphimosis or hypospadias. No discharge found.         Genitourinary Comments: PA student present as chaperone for genital exam.  Phimosis, for skin is moist, erythematous and macerated.  I am unable to retract the foreskin.  No paraphimosis.         ED Course   Procedures  Labs Reviewed   URINALYSIS, REFLEX TO URINE CULTURE          Imaging Results    None          Medical Decision Making:   History:   Old Medical Records: I decided to obtain old medical records.  Clinical Tests:   Lab Tests: Ordered and Reviewed  Other:   I have discussed this case with another health care  provider.       APC / Resident Notes:   78-year-old male presenting with penile irritation associated with phimosis as well as dysuria.  Patient seen in Urology Clinic last week, is scheduled to have a circumcision performed by Dr. Flores December 5th.  Foreskin is erythematous and irritated, am unable to retract the foreskin.  No paraphimosis.  No signs of penile entrapment.  DDx includes UTI, cutaneous candidiasis, cntact dermatitis. Discussed patient with Urology fellow on-call, they will discussed with Dr. Flores and attempt to schedule the patient for a earlier circumcision.  No indications for emergent surgery.  Will do UA to evaluate for UTI.    UA shows 7 RBCs, 3 WBCs.  Unconvincing for UTI, however given the patient has dysuria will discharge with Keflex for UTI.  Will also prescribed nystatin powder to help with drying skin and to avoid fungal infection of this moist skin.  Discussed results with patient as well as plan to follow up in Urology Clinic for outpatient circumcision.  Patient comfortable with plan.  Stressed the importance of follow-up, strict ED return precautions given, specifically related to paraphimosis and penile ischemia.  Patient voiced understanding is comfortable with discharge. I discussed this patient with my supervising physician.    Tracy Mckenna PA-C               Attending Attestation:     Physician Attestation Statement for NP/PA:   I discussed this assessment and plan of this patient with the NP/PA, but I did not personally examine the patient. The face to face encounter was performed by the NP/PA.                     Clinical Impression:   The encounter diagnosis was Phimosis.      Disposition:   Disposition: Discharged  Condition: Stable                        Tracy Mckenna PA-C  11/20/18 [id number]

## 2018-11-26 ENCOUNTER — ANESTHESIA EVENT (OUTPATIENT)
Dept: SURGERY | Facility: HOSPITAL | Age: 78
End: 2018-11-26
Payer: MEDICARE

## 2018-11-26 DIAGNOSIS — Z01.818 PREOPERATIVE TESTING: Primary | ICD-10-CM

## 2018-11-26 RX ORDER — MULTIVITAMIN
1 TABLET ORAL DAILY
COMMUNITY

## 2018-11-26 NOTE — PRE-PROCEDURE INSTRUCTIONS
Preop instructions given, pt verbalized understanding. All questions answered. Call with any changes.

## 2018-11-26 NOTE — ANESTHESIA PREPROCEDURE EVALUATION
Stephani Weller RN   Registered Nurse      Pre Admission Screening   Signed                             []Hide copied text    []Hover for details      Anesthesia Assessment: Preoperative EQUATION     Planned Procedure: Procedure(s) (LRB):  CIRCUMCISION (N/A)  CYSTOSCOPY (N/A)  Requested Anesthesia Type:General  Surgeon: Renato Flores MD  Service: Urology  Known or anticipated Date of Surgery:12/5/2018     Surgeon notes: reviewed     Electronic QUestionnaire Assessment completed via nurse interview with patient.         No AQ        Triage considerations:      The patient has no apparent active cardiac condition (No unstable coronary Syndrome such as severe unstable angina or recent [<1 month] myocardial infarction, decompensated CHF, severe valvular   disease or significant arrhythmia)     Previous anesthesia records:GETA, MAC, No problems and Not available     Last PCP note: 6-12 months ago , within [facility]   Subspecialty notes: Nephrology     Other important co-morbidities: HTN, GERD, CKD stage 1, HX Hyperthyroidism/fatty tumor     Tests already available:  Available tests,  6-12 months ago . 3/12/18 A1c. 2/21/18 TSH, CBC, CMP. No EKG.                            Instructions given. (See in Nurse's note)     Optimization:  Anesthesia Preop Clinic Assessment  Indicated-not required for this procedure                Plan:    Testing:  Hematology Profile, BMP and EKG                              Patient  has previously scheduled Medical Appointment:none     Navigation: Tests Scheduled. 11/27                                    Results will be tracked by Preop Clinic.                                    Electronically signed by Stephani Weller RN at 11/26/2018 11:09 AM       Pre-admit on 12/5/2018            Detailed Report    11/28/18-lab, EKG and surgeons visit noted.                                                                                                                   11/26/2018  Ajit Ayoub is a  78 y.o., male.    Anesthesia Evaluation    I have reviewed the Patient Summary Reports.    I have reviewed the Nursing Notes.   I have reviewed the Medications.     Review of Systems  Anesthesia Hx:  No problems with previous Anesthesia  Denies Family Hx of Anesthesia complications.   Denies Personal Hx of Anesthesia complications.   Social:  No Alcohol Use, Non-Smoker    Hematology/Oncology:  Hematology Normal      Oncology Comments: HX basal cell carcinoma face    EENT/Dental:EENT/Dental Normal   Cardiovascular:   Hypertension, well controlled  Denies Angina.  Functional Capacity 3.5 METS  Hypertension , Well Controlled on Rx    Pulmonary:  Pulmonary Normal  Denies Shortness of breath.  Denies Recent URI.    Renal/:   Chronic Renal Disease, CRI renal calculi  Renal Symptoms/Infections/Stones: hematuria.  Phimosis Kidney Function/Disease, Chronic Kidney Disease (CKD) , CKD Stage I (GFR >90 with signs of injury)    Hepatic/GI:  Esophageal / Stomach Disorders Gerd Controlled by PRN antireflux medication.    Musculoskeletal:  Musculoskeletal Normal    Neurological:  Neurology Normal    Endocrine:   Hyperthyroidism  Thyroid Disease, Hyperthyroidism (HX fatty tumor)    Psych:   anxiety          Physical Exam  General:  Well nourished    Airway/Jaw/Neck:  Airway Findings: Mouth Opening: Normal Tongue: Normal  General Airway Assessment: Adult  Mallampati: II  TM Distance: 4 - 6 cm  Jaw/Neck Findings:  Neck ROM: Normal ROM      Dental:  Dental Findings: In tact        Mental Status:  Mental Status Findings:  Cooperative         Anesthesia Plan  Type of Anesthesia, risks & benefits discussed:  Anesthesia Type:  general  Patient's Preference:   Intra-op Monitoring Plan:   Intra-op Monitoring Plan Comments:   Post Op Pain Control Plan: multimodal analgesia and IV/PO Opioids PRN  Post Op Pain Control Plan Comments:   Induction:   IV  Beta Blocker:  Patient is not currently on a Beta-Blocker (No further documentation  required).       Informed Consent: Patient understands risks and agrees with Anesthesia plan.  Questions answered. Anesthesia consent signed with patient.  ASA Score: 2     Day of Surgery Review of History & Physical:    H&P update referred to the surgeon.     Anesthesia Plan Notes: GA - LMA        Ready For Surgery From Anesthesia Perspective.

## 2018-11-26 NOTE — PRE ADMISSION SCREENING
Anesthesia Assessment: Preoperative EQUATION    Planned Procedure: Procedure(s) (LRB):  CIRCUMCISION (N/A)  CYSTOSCOPY (N/A)  Requested Anesthesia Type:General  Surgeon: Renato Flores MD  Service: Urology  Known or anticipated Date of Surgery:12/5/2018    Surgeon notes: reviewed    Electronic QUestionnaire Assessment completed via nurse interview with patient.        No AQ      Triage considerations:     The patient has no apparent active cardiac condition (No unstable coronary Syndrome such as severe unstable angina or recent [<1 month] myocardial infarction, decompensated CHF, severe valvular   disease or significant arrhythmia)    Previous anesthesia records:GETA, MAC, No problems and Not available    Last PCP note: 6-12 months ago , within [facility]   Subspecialty notes: Nephrology    Other important co-morbidities: HTN, GERD, CKD stage 1, HX Hyperthyroidism/fatty tumor     Tests already available:  Available tests,  6-12 months ago . 3/12/18 A1c. 2/21/18 TSH, CBC, CMP. No EKG.            Instructions given. (See in Nurse's note)    Optimization:  Anesthesia Preop Clinic Assessment  Indicated-not required for this procedure      Plan:    Testing:  Hematology Profile, BMP and EKG       Patient  has previously scheduled Medical Appointment:none    Navigation: Tests Scheduled. 11/27                      Results will be tracked by Preop Clinic.

## 2018-11-27 ENCOUNTER — OFFICE VISIT (OUTPATIENT)
Dept: UROLOGY | Facility: CLINIC | Age: 78
End: 2018-11-27
Payer: MEDICARE

## 2018-11-27 ENCOUNTER — NURSE TRIAGE (OUTPATIENT)
Dept: ADMINISTRATIVE | Facility: CLINIC | Age: 78
End: 2018-11-27

## 2018-11-27 ENCOUNTER — HOSPITAL ENCOUNTER (OUTPATIENT)
Dept: CARDIOLOGY | Facility: CLINIC | Age: 78
Discharge: HOME | End: 2018-11-27
Attending: ANESTHESIOLOGY
Payer: MEDICARE

## 2018-11-27 DIAGNOSIS — R21 SCROTAL RASH: ICD-10-CM

## 2018-11-27 DIAGNOSIS — N48.1 BALANITIS: Primary | ICD-10-CM

## 2018-11-27 DIAGNOSIS — Z01.818 PREOPERATIVE TESTING: ICD-10-CM

## 2018-11-27 PROCEDURE — 93010 ELECTROCARDIOGRAM REPORT: CPT | Mod: HCNC,S$GLB,, | Performed by: INTERNAL MEDICINE

## 2018-11-27 PROCEDURE — 93005 ELECTROCARDIOGRAM TRACING: CPT | Mod: HCNC,S$GLB,, | Performed by: ANESTHESIOLOGY

## 2018-11-27 PROCEDURE — 99999 PR PBB SHADOW E&M-EST. PATIENT-LVL II: CPT | Mod: PBBFAC,HCNC,, | Performed by: UROLOGY

## 2018-11-27 PROCEDURE — 99215 OFFICE O/P EST HI 40 MIN: CPT | Mod: HCNC,S$GLB,, | Performed by: UROLOGY

## 2018-11-27 PROCEDURE — 1101F PT FALLS ASSESS-DOCD LE1/YR: CPT | Mod: CPTII,HCNC,S$GLB, | Performed by: UROLOGY

## 2018-11-27 RX ORDER — MENTHOL AND ZINC OXIDE .44; 20.625 G/100G; G/100G
OINTMENT TOPICAL DAILY
Qty: 1 TUBE | Refills: 1 | Status: SHIPPED | OUTPATIENT
Start: 2018-11-27 | End: 2019-03-12 | Stop reason: ALTCHOICE

## 2018-11-27 RX ORDER — FLUCONAZOLE 100 MG/1
100 TABLET ORAL DAILY
Qty: 7 TABLET | Refills: 0 | Status: SHIPPED | OUTPATIENT
Start: 2018-11-27 | End: 2018-12-05

## 2018-11-27 NOTE — TELEPHONE ENCOUNTER
Reason for Disposition   [1] Caller requesting NON-URGENT health information AND [2] PCP's office is the best resource    Protocols used: ST INFORMATION ONLY CALL-A-AH      Pt states that he feels he has a urinary infection and would like to speak to someone in Dr Flores's office before 8:30 am which is when he has to leave to have test done for pre op. Please call pt at [phone] to advise. Thanks.

## 2018-11-27 NOTE — TELEPHONE ENCOUNTER
I spoke to him in detail yesterday and I advised him that I would speak to dr. Flores today. He requested a call back before 10am this morning. I did explain that I could not guarantee a time, but I would call back him with dr. Flores's recommendations today

## 2018-11-28 NOTE — H&P (VIEW-ONLY)
CC: penile rash, spraying of urine, anxiety and stress affecting his LUTS, hx of MH    Subjective:       Patient ID: Ajit Ayoub is a 78 y.o. male.    Chief Complaint: Penis Pain (requested to be seen today for trouble pulling foreskin back)    Ajit Ayoub is a 78 y.o. Male with history of MH, BPH and prostatitis.  Recently seen by me as a referral from Jaye Roberts.  I diagnosed severe phimosis affecting his urination, and recommended circumcision with cysto under anesthesia.  However, he was not interested in scheduling his surgery until next year, despite my recommendation of having it done rather urgently due to severe nature of his phimosis.    Now after numerous phone calls and his visit to ER and his PCP, he is here to discuss his surgery.  He has been using bacitracin ointment initially, then was switched to Nystantin powder per another physician.  C/o worsening penile rash and swelling, urine leakage.    Has a hx of increased frequency, urgency, and prostate pain, especially when he gets stressed out or anxious.  He would like to get something to control his anxiety and stress.    History of TURP in 2007.  His TURP he has been taking regular Bactrim (400mg) daily.  He was last seen in clinic with Dr. Flores 02/26/2018; bactrim was refilled.  He was to have a cysto for microscopic hematuria but refused.  He states had before and did not want it again  He has a lot of other pressing issues; allergic rashes.   He never sees blood when urinating.  He states that he has tried Flomax in the past and did not like; feels he allergic to it.      Lab Results       Component                Value               Date                       PSA                      2.1                 02/21/2018                    Past Medical History:  No date: Chronic prostatitis  No date: Hyperthyroidism  No date: Obesity (BMI 30-39.9)  No date: Vitamin D insufficiency    Past Surgical History:  No date: TRANSURETHRAL RESECTION  OF PROSTATE    Review of patient's family history indicates:  Problem: Diabetes      Relation: Mother          Age of Onset: (Not Specified)  Problem: Heart disease      Relation: Mother          Age of Onset: (Not Specified)  Problem: Diabetes      Relation: Father          Age of Onset: (Not Specified)  Problem: Heart disease      Relation: Father          Age of Onset: (Not Specified)  Problem: Cancer      Relation: Neg Hx          Age of Onset: (Not Specified)  Problem: Stroke      Relation: Neg Hx          Age of Onset: (Not Specified)      Social History    Socioeconomic History      Marital status: Single      Spouse name: Not on file      Number of children: Not on file      Years of education: Not on file      Highest education level: Not on file    Social Needs      Financial resource strain: Not on file      Food insecurity - worry: Not on file      Food insecurity - inability: Not on file      Transportation needs - medical: Not on file      Transportation needs - non-medical: Not on file    Occupational History      Occupation: Retired      Tobacco Use      Smoking status: Never Smoker      Smokeless tobacco: Never Used    Substance and Sexual Activity      Alcohol use: No      Drug use: No      Sexual activity: Not Currently        Partners: Female    Other Topics      Concerns:        Not on file    Social History Narrative      Not on file      Allergies:  Contrast media and Pneumoc 13-lori conj-dip cr(pf)    Medications:  Current Outpatient Medications:   ergocalciferol (ERGOCALCIFEROL) 50,000 unit Cap, , Disp: , Rfl:   ibuprofen (ADVIL,MOTRIN) 600 MG tablet, , Disp: , Rfl:   lisinopril 10 MG tablet, Take 10 mg by mouth once daily. , Disp: , Rfl:   LUMIGAN 0.01 % Drop, , Disp: , Rfl:   sulfamethoxazole-trimethoprim 400-80mg (BACTRIM) 400-80 mg per tablet, Take 1 tablet by mouth once daily., Disp: 90 tablet, Rfl: 3  sulfamethoxazole-trimethoprim 800-160mg (BACTRIM DS) 800-160 mg  Tab, , Disp: , Rfl:         Penis Pain   The patient's primary symptoms include penile pain. The patient's pertinent negatives include no scrotal swelling or testicular pain. Associated symptoms include a rash. Pertinent negatives include no abdominal pain, chest pain, chills, constipation, diarrhea, dysuria, fever, flank pain, nausea, shortness of breath or vomiting. There is no history of erectile dysfunction.     Review of Systems   Constitutional: Negative for activity change, appetite change, chills and fever.   HENT: Negative for facial swelling and trouble swallowing.    Eyes: Negative for visual disturbance.   Respiratory: Negative for chest tightness and shortness of breath.    Cardiovascular: Negative for chest pain and palpitations.   Gastrointestinal: Negative.  Negative for abdominal pain, constipation, diarrhea, nausea and vomiting.   Genitourinary: Positive for penile pain. Negative for difficulty urinating, dysuria, flank pain, hematuria, penile swelling, scrotal swelling and testicular pain.        Today FOS is good.  Nocturia 0-1x     Musculoskeletal: Negative for back pain, gait problem, myalgias and neck stiffness.   Skin: Positive for rash.   Neurological: Negative for dizziness and speech difficulty.   Hematological: Does not bruise/bleed easily.   Psychiatric/Behavioral: Negative for behavioral problems. The patient is nervous/anxious.        Objective:      Physical Exam   Nursing note and vitals reviewed.  Constitutional: He is oriented to person, place, and time. He appears well-developed and well-nourished. He is active and cooperative.  Non-toxic appearance. He does not have a sickly appearance. No distress.   Urine dipped clear of infection.   Microscopic hematuria.     HENT:   Head: Normocephalic and atraumatic.   Right Ear: External ear normal.   Left Ear: External ear normal.   Nose: Nose normal.   Mouth/Throat: Oropharynx is clear and moist and mucous membranes are normal.   Eyes:  Conjunctivae and lids are normal. Pupils are equal, round, and reactive to light. No scleral icterus.   Neck: Trachea normal, normal range of motion and full passive range of motion without pain. Neck supple. No JVD present. No tracheal deviation present. No thyromegaly present.   Cardiovascular: Normal rate, regular rhythm, S1 normal, S2 normal, normal heart sounds and intact distal pulses.  Exam reveals no gallop and no friction rub.    No murmur heard.  Pulmonary/Chest: Effort normal and breath sounds normal. No respiratory distress. He has no wheezes. He exhibits no tenderness.   Abdominal: Soft. Normal appearance and bowel sounds are normal. He exhibits no distension and no mass. There is no hepatosplenomegaly. There is no tenderness. There is no rebound, no guarding and no CVA tenderness.   Genitourinary: Rectum normal and testes normal. Right testis shows no mass, no swelling and no tenderness. Left testis shows no mass, no swelling and no tenderness. Uncircumcised. Penile tenderness present. No phimosis, paraphimosis, hypospadias or penile erythema. No discharge found.       Genitourinary Comments: His entire penis skin is slightly more edematous with rash.  The redundant foreskin is severe inflamed swollen.  Unable to retract its foreskin and able to find urethral meatus.  The scrotum: positive rash with redness.  No evidence of cellulites noted.     Musculoskeletal: Normal range of motion. He exhibits no edema, tenderness or deformity.   Lymphadenopathy:     He has no cervical adenopathy. No inguinal adenopathy noted on the right or left side.   Neurological: He is alert and oriented to person, place, and time. He has normal strength.   Skin: Skin is warm, dry and intact. Lesion noted. No rash noted. He is not diaphoretic. No erythema.     (+) erythema to his arms.     Psychiatric: He has a normal mood and affect. His behavior is normal. Judgment and thought content normal.         US 9/20/18  Sonographic  findings consistent with medical renal disease.    Bilateral renal simple and minimally complex cysts.    Right renal nonobstructing nephrolith.    UA 50 + blood    Assessment:       Balanitis  -     fluconazole (DIFLUCAN) 100 MG tablet; Take 1 tablet (100 mg total) by mouth once daily. for 7 days  Dispense: 7 tablet; Refill: 0  -     menthol-zinc oxide (CALMOSEPTINE) 0.44-20.6 % Oint; Apply topically once daily.  Dispense: 1 Tube; Refill: 1    Scrotal rash  -     menthol-zinc oxide (CALMOSEPTINE) 0.44-20.6 % Oint; Apply topically once daily.  Dispense: 1 Tube; Refill: 1      Plan:           Suspect either worsening of balanitis or possible allergic reaction to bacitracin ointment or nystatin powder.  Pt reports that he did not notice the swelling and rash on the penis and scrotum until he uses nystatin for a couple of days.  So for now, I instructed to stop both bacitracin ointment and nystatin powder.  Start calmoseptine ointment insead after he takes a bath.  Use diflucan for possible balanitis.  Minimize urine irritating penile and scrotal skin.  His circumcision and cysto are scheduled on 12/5 next Weds, that is the earliest time available to take care of him at this time.  All questions answered.    I spent 40 minutes with the patient of which more than half was spent in direct consultation with the patient in regards to our treatment and plan.      Follow up:  Follow-up in about 8 days (around 12/5/2018) for circumcision, cysto.

## 2018-12-04 ENCOUNTER — TELEPHONE (OUTPATIENT)
Dept: UROLOGY | Facility: CLINIC | Age: 78
End: 2018-12-04

## 2018-12-04 NOTE — TELEPHONE ENCOUNTER
----- Message from Penelope Sousa LPN sent at 12/4/2018  1:56 PM CST -----  Contact: [phone]  States the area near the urethra is discolored and skin is real tight. He is able to void , but thinks his penis is getting worse   ----- Message -----  From: Penelope Sousa LPN  Sent: 12/4/2018   1:53 PM  To: Penelope Sousa LPN        ----- Message -----  From: Savanah Watts MA  Sent: 12/4/2018   1:44 PM  To: [facility] Staff    Needs Advice    Reason for call: pt said that something is happening that may interfere with his surgery tomorrow         Communication Preference: [phone]    Additional Information: please call

## 2018-12-05 ENCOUNTER — ANESTHESIA (OUTPATIENT)
Dept: SURGERY | Facility: HOSPITAL | Age: 78
End: 2018-12-05
Payer: MEDICARE

## 2018-12-05 ENCOUNTER — HOSPITAL ENCOUNTER (OUTPATIENT)
Facility: HOSPITAL | Age: 78
Discharge: HOME | End: 2018-12-05
Attending: UROLOGY | Admitting: UROLOGY
Payer: MEDICARE

## 2018-12-05 VITALS
WEIGHT: 170 LBS | OXYGEN SATURATION: 96 % | RESPIRATION RATE: 16 BRPM | HEART RATE: 80 BPM | HEIGHT: 65 IN | SYSTOLIC BLOOD PRESSURE: 151 MMHG | BODY MASS INDEX: 28.32 KG/M2 | DIASTOLIC BLOOD PRESSURE: 82 MMHG | TEMPERATURE: 98 F

## 2018-12-05 DIAGNOSIS — N48.1 BALANITIS: ICD-10-CM

## 2018-12-05 DIAGNOSIS — N47.1 PHIMOSIS: Primary | ICD-10-CM

## 2018-12-05 PROCEDURE — 27000221 HC OXYGEN, UP TO 24 HOURS: Mod: HCNC

## 2018-12-05 PROCEDURE — 25000003 PHARM REV CODE 250: Mod: HCNC | Performed by: NURSE ANESTHETIST, CERTIFIED REGISTERED

## 2018-12-05 PROCEDURE — 36000706: Mod: HCNC | Performed by: UROLOGY

## 2018-12-05 PROCEDURE — D9220A PRA ANESTHESIA: Mod: HCNC,ANES,, | Performed by: ANESTHESIOLOGY

## 2018-12-05 PROCEDURE — 88304 TISSUE EXAM BY PATHOLOGIST: CPT | Mod: HCNC | Performed by: PATHOLOGY

## 2018-12-05 PROCEDURE — 94761 N-INVAS EAR/PLS OXIMETRY MLT: CPT | Mod: HCNC

## 2018-12-05 PROCEDURE — 63600175 PHARM REV CODE 636 W HCPCS: Mod: HCNC | Performed by: STUDENT IN AN ORGANIZED HEALTH CARE EDUCATION/TRAINING PROGRAM

## 2018-12-05 PROCEDURE — 25000003 PHARM REV CODE 250: Mod: HCNC | Performed by: STUDENT IN AN ORGANIZED HEALTH CARE EDUCATION/TRAINING PROGRAM

## 2018-12-05 PROCEDURE — 36000707: Mod: HCNC | Performed by: UROLOGY

## 2018-12-05 PROCEDURE — 37000008 HC ANESTHESIA 1ST 15 MINUTES: Mod: HCNC | Performed by: UROLOGY

## 2018-12-05 PROCEDURE — 71000015 HC POSTOP RECOV 1ST HR: Mod: HCNC | Performed by: UROLOGY

## 2018-12-05 PROCEDURE — 71000033 HC RECOVERY, INTIAL HOUR: Mod: HCNC | Performed by: UROLOGY

## 2018-12-05 PROCEDURE — 88304 TISSUE EXAM BY PATHOLOGIST: CPT | Mod: 26,HCNC,, | Performed by: PATHOLOGY

## 2018-12-05 PROCEDURE — C1758 CATHETER, URETERAL: HCPCS | Mod: HCNC | Performed by: UROLOGY

## 2018-12-05 PROCEDURE — 63600175 PHARM REV CODE 636 W HCPCS: Mod: HCNC | Performed by: NURSE ANESTHETIST, CERTIFIED REGISTERED

## 2018-12-05 PROCEDURE — 54161 CIRCUM 28 DAYS OR OLDER: CPT | Mod: HCNC,,, | Performed by: UROLOGY

## 2018-12-05 PROCEDURE — 52000 CYSTOURETHROSCOPY: CPT | Mod: 59,HCNC,, | Performed by: UROLOGY

## 2018-12-05 PROCEDURE — 27200651 HC AIRWAY, LMA: Mod: HCNC | Performed by: NURSE ANESTHETIST, CERTIFIED REGISTERED

## 2018-12-05 PROCEDURE — 37000009 HC ANESTHESIA EA ADD 15 MINS: Mod: HCNC | Performed by: UROLOGY

## 2018-12-05 RX ORDER — TRAMADOL HYDROCHLORIDE 50 MG/1
50 TABLET ORAL EVERY 4 HOURS PRN
Status: DISCONTINUED | OUTPATIENT
Start: 2018-12-05 | End: 2018-12-05 | Stop reason: HOSPADM

## 2018-12-05 RX ORDER — SODIUM CHLORIDE 9 MG/ML
INJECTION, SOLUTION INTRAVENOUS CONTINUOUS
Status: DISCONTINUED | OUTPATIENT
Start: 2018-12-05 | End: 2018-12-05 | Stop reason: HOSPADM

## 2018-12-05 RX ORDER — MIDAZOLAM HYDROCHLORIDE 1 MG/ML
INJECTION, SOLUTION INTRAMUSCULAR; INTRAVENOUS
Status: DISCONTINUED | OUTPATIENT
Start: 2018-12-05 | End: 2018-12-05

## 2018-12-05 RX ORDER — LIDOCAINE HCL/PF 100 MG/5ML
SYRINGE (ML) INTRAVENOUS
Status: DISCONTINUED | OUTPATIENT
Start: 2018-12-05 | End: 2018-12-05

## 2018-12-05 RX ORDER — PHENYLEPHRINE HYDROCHLORIDE 10 MG/ML
INJECTION INTRAVENOUS
Status: DISCONTINUED | OUTPATIENT
Start: 2018-12-05 | End: 2018-12-05

## 2018-12-05 RX ORDER — TRAMADOL HYDROCHLORIDE 50 MG/1
50 TABLET ORAL EVERY 6 HOURS PRN
Qty: 11 TABLET | Refills: 0 | Status: SHIPPED | OUTPATIENT
Start: 2018-12-05 | End: 2019-03-12 | Stop reason: ALTCHOICE

## 2018-12-05 RX ORDER — SODIUM CHLORIDE 0.9 % (FLUSH) 0.9 %
3 SYRINGE (ML) INJECTION
Status: DISCONTINUED | OUTPATIENT
Start: 2018-12-05 | End: 2018-12-05 | Stop reason: HOSPADM

## 2018-12-05 RX ORDER — PROPOFOL 10 MG/ML
VIAL (ML) INTRAVENOUS
Status: DISCONTINUED | OUTPATIENT
Start: 2018-12-05 | End: 2018-12-05

## 2018-12-05 RX ORDER — CEPHALEXIN 500 MG/1
500 CAPSULE ORAL EVERY 6 HOURS
Qty: 12 CAPSULE | Refills: 0 | Status: SHIPPED | OUTPATIENT
Start: 2018-12-05 | End: 2018-12-08

## 2018-12-05 RX ORDER — DEXAMETHASONE SODIUM PHOSPHATE 4 MG/ML
INJECTION, SOLUTION INTRA-ARTICULAR; INTRALESIONAL; INTRAMUSCULAR; INTRAVENOUS; SOFT TISSUE
Status: DISCONTINUED | OUTPATIENT
Start: 2018-12-05 | End: 2018-12-05

## 2018-12-05 RX ORDER — ONDANSETRON 2 MG/ML
INJECTION INTRAMUSCULAR; INTRAVENOUS
Status: DISCONTINUED | OUTPATIENT
Start: 2018-12-05 | End: 2018-12-05

## 2018-12-05 RX ORDER — FENTANYL CITRATE 50 UG/ML
INJECTION, SOLUTION INTRAMUSCULAR; INTRAVENOUS
Status: DISCONTINUED | OUTPATIENT
Start: 2018-12-05 | End: 2018-12-05

## 2018-12-05 RX ORDER — CEFAZOLIN SODIUM 1 G/3ML
2 INJECTION, POWDER, FOR SOLUTION INTRAMUSCULAR; INTRAVENOUS
Status: COMPLETED | OUTPATIENT
Start: 2018-12-05 | End: 2018-12-05

## 2018-12-05 RX ORDER — ONDANSETRON 8 MG/1
8 TABLET, ORALLY DISINTEGRATING ORAL EVERY 8 HOURS PRN
Status: DISCONTINUED | OUTPATIENT
Start: 2018-12-05 | End: 2018-12-05 | Stop reason: HOSPADM

## 2018-12-05 RX ORDER — HYDROMORPHONE HYDROCHLORIDE 1 MG/ML
0.2 INJECTION, SOLUTION INTRAMUSCULAR; INTRAVENOUS; SUBCUTANEOUS EVERY 5 MIN PRN
Status: DISCONTINUED | OUTPATIENT
Start: 2018-12-05 | End: 2018-12-05 | Stop reason: HOSPADM

## 2018-12-05 RX ADMIN — PROPOFOL 150 MG: 10 INJECTION, EMULSION INTRAVENOUS at 01:12

## 2018-12-05 RX ADMIN — ONDANSETRON 4 MG: 2 INJECTION INTRAMUSCULAR; INTRAVENOUS at 02:12

## 2018-12-05 RX ADMIN — MIDAZOLAM HYDROCHLORIDE 1 MG: 1 INJECTION, SOLUTION INTRAMUSCULAR; INTRAVENOUS at 01:12

## 2018-12-05 RX ADMIN — SODIUM CHLORIDE: 0.9 INJECTION, SOLUTION INTRAVENOUS at 01:12

## 2018-12-05 RX ADMIN — DEXAMETHASONE SODIUM PHOSPHATE 8 MG: 4 INJECTION, SOLUTION INTRAMUSCULAR; INTRAVENOUS at 01:12

## 2018-12-05 RX ADMIN — LIDOCAINE HYDROCHLORIDE 50 MG: 20 INJECTION, SOLUTION INTRAVENOUS at 01:12

## 2018-12-05 RX ADMIN — PHENYLEPHRINE HYDROCHLORIDE 100 MCG: 10 INJECTION INTRAVENOUS at 02:12

## 2018-12-05 RX ADMIN — SODIUM CHLORIDE, SODIUM GLUCONATE, SODIUM ACETATE, POTASSIUM CHLORIDE, MAGNESIUM CHLORIDE, SODIUM PHOSPHATE, DIBASIC, AND POTASSIUM PHOSPHATE: .53; .5; .37; .037; .03; .012; .00082 INJECTION, SOLUTION INTRAVENOUS at 02:12

## 2018-12-05 RX ADMIN — CEFAZOLIN 2 G: 330 INJECTION, POWDER, FOR SOLUTION INTRAMUSCULAR; INTRAVENOUS at 01:12

## 2018-12-05 RX ADMIN — FENTANYL CITRATE 50 MCG: 50 INJECTION, SOLUTION INTRAMUSCULAR; INTRAVENOUS at 01:12

## 2018-12-05 RX ADMIN — FENTANYL CITRATE 25 MCG: 50 INJECTION, SOLUTION INTRAMUSCULAR; INTRAVENOUS at 01:12

## 2018-12-05 RX ADMIN — FENTANYL CITRATE 25 MCG: 50 INJECTION, SOLUTION INTRAMUSCULAR; INTRAVENOUS at 02:12

## 2018-12-05 NOTE — INTERVAL H&P NOTE
The patient has been examined and the H&P has been reviewed:    I concur with the findings and no changes have occurred since H&P was written.    Anesthesia/Surgery risks, benefits and alternative options discussed and understood by patient/family.          Active Hospital Problems    Diagnosis  POA    Phimosis [N47.1]  Yes      Resolved Hospital Problems   No resolved problems to display.

## 2018-12-05 NOTE — DISCHARGE INSTRUCTIONS
Post Circumcision Instructions    1. No sex or masturbation for SIX weeks  2. Ok to remove dressing in 1 day  3. Do not get your incision wet for 2 days  4. If you had a circumcision, ok to put bacitracin/Neosporin on incision to keep it from sticking to your underwear.  5. Return to ER or call [phone] and ask to speak with the urology clinic if you have any excessive bleeding or swelling  6. Use Tylenol or Advil first then use the pain medicine we prescribe for breakthrough pain medicine, do not exceed 4000mg of Tylenol  7. If you see excessive bleeding or swelling, return to ER        PATIENT INSTRUCTIONS  POST-ANESTHESIA    IMMEDIATELY FOLLOWING SURGERY:  Do not drive or operate machinery for the first twenty four hours after surgery.  Do not make any important decisions for twenty four hours after surgery or while taking narcotic pain medications or sedatives.  If you develop intractable nausea and vomiting or a severe headache please notify your doctor immediately.    FOLLOW-UP:  Please make an appointment with your surgeon as instructed. You do not need to follow up with anesthesia unless specifically instructed to do so.    WOUND CARE INSTRUCTIONS (if applicable):  Keep a dry clean dressing on the anesthesia/puncture wound site if there is drainage.  Once the wound has quit draining you may leave it open to air.  Generally you should leave the bandage intact for twenty four hours unless there is drainage.  If the epidural site drains for more than 36-48 hours please call the anesthesia department.    QUESTIONS?:  Please feel free to call your physician or the hospital  if you have any questions, and they will be happy to assist you.       PATIENT INSTRUCTIONS  POST-ANESTHESIA    IMMEDIATELY FOLLOWING SURGERY:  Do not drive or operate machinery for the first twenty four hours after surgery.  Do not make any important decisions for twenty four hours after surgery or while taking narcotic pain  medications or sedatives.  If you develop intractable nausea and vomiting or a severe headache please notify your doctor immediately.    FOLLOW-UP:  Please make an appointment with your surgeon as instructed. You do not need to follow up with anesthesia unless specifically instructed to do so.    WOUND CARE INSTRUCTIONS (if applicable):  Keep a dry clean dressing on the anesthesia/puncture wound site if there is drainage.  Once the wound has quit draining you may leave it open to air.  Generally you should leave the bandage intact for twenty four hours unless there is drainage.  If the epidural site drains for more than 36-48 hours please call the anesthesia department.    QUESTIONS?:  Please feel free to call your physician or the hospital  if you have any questions, and they will be happy to assist you.         Recovery After Procedural Sedation (Adult)  You have been given medicine by vein to make you sleep during your surgery. This may have included both a pain medicine and sleeping medicine. Most of the effects have worn off. But you may still have some drowsiness for the next 6 to 8 hours.  Home care  Follow these guidelines when you get home:  · For the next 8 hours, you should be watched by a responsible adult. This person should make sure your condition is not getting worse.  · Don't drink any alcohol for the next 24 hours.  · Don't drive, operate dangerous machinery, or make important business or personal decisions during the next 24 hours.  Note: Your healthcare provider may tell you not to take any medicine by mouth for pain or sleep in the next 4 hours. These medicines may react with the medicines you were given in the hospital. This could cause a much stronger response than usual.  Follow-up care  Follow up with your healthcare provider if you are not alert and back to your usual level of activity within 12 hours.  When to seek medical advice  Call your healthcare provider right away if any of  these occur:  · Drowsiness gets worse  · Weakness or dizziness gets worse  · Repeated vomiting  · You can't be awakened   Date Last Reviewed: 10/18/2016  © 7192-0886 [employer]. [address] [place] [address], [place] [address]. All rights reserved. This information is not intended as a substitute for professional medical care. Always follow your healthcare professional's instructions.                Cystoscopy    Cystoscopy is a procedure that lets your doctor look directly inside your urethra and bladder. It can be used to:  · Help diagnose a problem with your urethra, bladder, or kidneys.  · Take a sample (biopsy) of bladder or urethral tissue.  · Treat certain problems (such as removing kidney stones).  · Place a stent to bypass an obstruction.  · Take special X-rays of the kidneys.  Based on the findings, your doctor may recommend other tests or treatments.  What is a cystoscope?  A cystoscope is a telescope-like instrument that contains lenses and fiberoptics (small glass wires that make bright light). The cystoscope may be straight and rigid, or flexible to bend around curves in the urethra. The doctor may look directly into the cystoscope, or project the image onto a monitor.  Getting ready  · Ask your doctor if you should stop taking any medicines before the procedure.  · Ask whether you should avoid eating or drinking anything after midnight before the procedure.  · Follow any other instructions your doctor gives you.  Tell your doctor before the exam if you:  · Take any medicines, such as aspirin or blood thinners  · Have allergies to any medicines  · Are pregnant   The procedure  Cystoscopy is done in the doctors office, surgery center, or hospital. The doctor and a nurse are present during the procedure. It takes only a few minutes, longer if a biopsy, X-ray, or treatment needs to be done.  During the procedure:  · You lie on an exam table on your back, knees bent and legs apart. You are covered  with a drape.  · Your urethra and the area around it are washed. Anesthetic jelly may be applied to numb the urethra. Other pain medicine is usually not needed. In some cases, you may be offered a mild sedative to help you relax. If a more extensive procedure is to be done, such as a biopsy or kidney stone removal, general anesthesia may be needed.  · The cystoscope is inserted. A sterile fluid is put into the bladder to expand it. You may feel pressure from this fluid.  · When the procedure is done, the cystoscope is removed.  After the procedure  If you had a sedative, general anesthesia, or spinal anesthesia, you must have someone drive you home. Once youre home:  · Drink plenty of fluids.  · You may have burning or light bleeding when you urinate--this is normal.  · Medicines may be prescribed to ease any discomfort or prevent infection. Take these as directed.  · Call your doctor if you have heavy bleeding or blood clots, burning that lasts more than a day, a fever over 100°F  (38° C), or trouble urinating.  Date Last Reviewed: 1/1/2017  © 1448-9006 [employer]. [address] [place] [address], [place] [address]. All rights reserved. This information is not intended as a substitute for professional medical care. Always follow your healthcare professional's instructions.

## 2018-12-05 NOTE — DISCHARGE SUMMARY
[facility]  Discharge Note  Short Stay    Admit Date: 12/5/2018    Discharge Date and Time: 12/05/2018 1:43 PM      Attending Physician: Renato Flores MD     Discharge Provider: Kia Dela Cruz    Diagnoses:  Active Hospital Problems    Diagnosis  POA    *Phimosis [N47.1]  Yes    Scrotal rash [R21]  Yes    Balanitis [N48.1]  Yes    Anxiety [F41.9]  Yes    CKD (chronic kidney disease) stage 1, GFR 90 ml/min or greater [N18.1]  Yes    S/P TURP [Z90.79]  Not Applicable    Hematuria, microscopic [R31.29]  Yes    Vitamin D insufficiency [E55.9]  Yes    Obesity (BMI 30-39.9) [E66.9]  Yes    Essential hypertension [I10]  Yes    Chronic prostatitis [N41.1]  Yes      Resolved Hospital Problems   No resolved problems to display.       Discharged Condition: good    Hospital Course: Patient was admitted for circumcision and tolerated the procedure well with no complications. The patient was discharged home in good condition on the same day.       Final Diagnoses: Same as principal problem.    Disposition: Home or Self Care    Follow up/Patient Instructions:    Medications:  Reconciled Home Medications:   Current Discharge Medication List      START taking these medications    Details   cephALEXin (KEFLEX) 500 MG capsule Take 1 capsule (500 mg total) by mouth every 6 (six) hours. for 3 days  Qty: 12 capsule, Refills: 0      traMADol (ULTRAM) 50 mg tablet Take 1 tablet (50 mg total) by mouth every 6 (six) hours as needed for Pain.  Qty: 11 tablet, Refills: 0         CONTINUE these medications which have NOT CHANGED    Details   ALPRAZolam (XANAX) 0.25 MG tablet Take 1 tablet (0.25 mg total) by mouth 2 (two) times daily as needed for Anxiety.  Qty: 60 tablet, Refills: 0    Associated Diagnoses: Anxiety      bacitracin 500 unit/gram Oint Apply topically 2 (two) times daily.  Qty: 1 Tube, Refills: 1    Associated Diagnoses: Balanitis; Phimosis      ergocalciferol (ERGOCALCIFEROL) 50,000 unit Cap        fluconazole (DIFLUCAN) 100 MG tablet Take 1 tablet (100 mg total) by mouth once daily. for 7 days  Qty: 7 tablet, Refills: 0    Associated Diagnoses: Balanitis      ibuprofen (ADVIL,MOTRIN) 600 MG tablet       lisinopril 10 MG tablet Take 1 tablet (10 mg total) by mouth once daily.  Qty: 90 tablet, Refills: 1      menthol-zinc oxide (CALMOSEPTINE) 0.44-20.6 % Oint Apply topically once daily.  Qty: 1 Tube, Refills: 1    Associated Diagnoses: Balanitis; Scrotal rash      multivitamin (ONE DAILY MULTIVITAMIN) per tablet Take 1 tablet by mouth once daily.      nystatin (MYCOSTATIN) powder Apply topically 4 (four) times daily.  Qty: 60 g, Refills: 0      sulfamethoxazole-trimethoprim 400-80mg (BACTRIM) 400-80 mg per tablet Take 1 tablet by mouth once daily.  Qty: 90 tablet, Refills: 3    Associated Diagnoses: Chronic prostatitis      sulfamethoxazole-trimethoprim 800-160mg (BACTRIM DS) 800-160 mg Tab       LUMIGAN 0.01 % Drop            Discharge Procedure Orders   Diet general     Call MD for:  temperature >100.4     Call MD for:  persistent nausea and vomiting     Call MD for:  severe uncontrolled pain     Call MD for:  redness, tenderness, or signs of infection (pain, swelling, redness, odor or green/yellow discharge around incision site)     Remove dressing in 24 hours     Follow-up Information     Renato Flores MD On 12/17/2018.    Specialty:  Urology  Why:  post op  Contact information:  [facility] [address]  [place] [address]  [phone]                     Kia Dela Cruz MD  Urology, PGY- 4  Pager# [phone]

## 2018-12-05 NOTE — PLAN OF CARE
Discharge instructions reviewed with pt and family. Understanding verbalized. No complaints of pain reported. Pt able to tolerate po intake and urinate in restroom. Transported to pharmacy by PCT to  medications.

## 2018-12-05 NOTE — OP NOTE
[facility]  Operative Note    Date: 12/05/2018    Pre-Op Diagnosis:   1. Phimosis  2. Balanitis  3. Microscopic hematuria    Patient Active Problem List   Diagnosis    Essential hypertension    Chronic prostatitis    Obesity (BMI 30-39.9)    Vitamin D insufficiency    Hematuria, microscopic    S/P TURP    CKD (chronic kidney disease) stage 1, GFR 90 ml/min or greater    Anxiety    Kidney stone    Phimosis    Balanitis    Scrotal rash     Post-Op Diagnosis: same    Procedure(s) Performed:   1.  Circumcision   2.  Cystoscopy    Specimen(s): foreskin    Staff Surgeon: Renato Flores MD    Assistant Surgeon: Kia Dela Cruz MD    Anesthesia: General endotracheal anesthesia    Indications: Ajit Ayoub is a 78 y.o. male with phimosis who presents for a circumcision.      Findings:   - dorsal slit performed to expose glans  - uncomplicated circumcision with only minor irritation to glans noted  - no abnormalities on cystoscopy     Estimated Blood Loss: min    Drains: none    Procedure in detail:  After risks, benefits and possible complications of circumcision were discussed, the patient elected to under go the procedure and informed consent was obtained.  All questions were answered in the pre-operative area. The patient was transferred to the operative suite and placed in supine position.  SCDs were applied and working.  After adequate anesthesia the patient was prepped and draped in the usual sterile fashion. Time out was preformed, hector-procedural antibiotic were confirmed.     A hemostat was used to clamp the foreskin dorsally at the midline. This was held for 2 minutes and then the area was incised with Metzenbaum scissors. We were then able to completely retract the foreskin.     A marking pen was used to rachael our incisions, at the coronal sulcus with the foreskin in the normal anatomic position and 1 cm proximal to the glans in the retracted position.  A 15 blade was used to  sharply incise our marked lines.  The foreskin was reduced and removed with electrocautery by connecting the two incisions we just made. The free edges were then re approximated in a interrupted fashion using a 3-0 chromic.     We then performed flexible cystoscopy. There were no urethral abnormalities noted and no strictures. The prostatic urethra was s/p TURP with minor regrowth of adenoma however the channel was open. There were no bladder tumors, stones, or suspicious lesions present within the bladder. The ureteral orifices were in normal anatomical position.     A sterile dressing was applied.  The patient tolerated the procedure well and was transferred to the PACU in stable condition    Disposition: The patient will follow up with Dr. Flores in 2 weeks.  He was given prescriptions for tramadol and keflex and instructed to avoid sex/masturbation x 6 weeks.      Kia Dela Cruz MD

## 2018-12-05 NOTE — TRANSFER OF CARE
"Anesthesia Transfer of Care Note    Patient: Ajit Ayoub    Procedure(s) Performed: Procedure(s) (LRB):  CIRCUMCISION (N/A)  CYSTOSCOPY (N/A)    Patient location: PACU    Anesthesia Type: general    Transport from OR: Transported from OR on 6-10 L/min O2 by face mask with adequate spontaneous ventilation    Post pain: adequate analgesia    Post assessment: no apparent anesthetic complications and tolerated procedure well    Post vital signs: stable    Level of consciousness: sedated and responds to stimulation    Nausea/Vomiting: no nausea/vomiting    Complications: none          Last vitals:   Visit Vitals  /77   Pulse 77   Temp 36.1 °C (97 °F) (Axillary)   Resp 16   Ht 5' 5" (1.651 m)   Wt 77.1 kg (170 lb)   SpO2 100%   BMI 28.29 kg/m²     "

## 2018-12-06 ENCOUNTER — TELEPHONE (OUTPATIENT)
Dept: UROLOGY | Facility: CLINIC | Age: 78
End: 2018-12-06

## 2018-12-06 NOTE — ANESTHESIA POSTPROCEDURE EVALUATION
"Anesthesia Post Evaluation    Patient: Ajit Ayoub    Procedure(s) Performed: Procedure(s) (LRB):  CIRCUMCISION (N/A)  CYSTOSCOPY (N/A)    Final Anesthesia Type: general  Patient location during evaluation: PACU  Patient participation: Yes- Able to Participate  Level of consciousness: awake and alert and oriented  Post-procedure vital signs: reviewed and stable  Pain management: adequate  Airway patency: patent  PONV status at discharge: No PONV  Anesthetic complications: no      Cardiovascular status: hemodynamically stable  Respiratory status: unassisted and spontaneous ventilation  Hydration status: euvolemic  Follow-up not needed.        Visit Vitals  BP (!) 151/82   Pulse 80   Temp 36.4 °C (97.6 °F) (Skin)   Resp 16   Ht 5' 5" (1.651 m)   Wt 77.1 kg (170 lb)   SpO2 96%   BMI 28.29 kg/m²       Pain/Gennaro Score: Pain Assessment Performed: Yes (12/5/2018  4:00 PM)  Presence of Pain: denies (12/5/2018  4:00 PM)  Pain Rating Prior to Med Admin: 0 (12/5/2018 12:00 PM)  Gennaro Score: 10 (12/5/2018  3:21 PM)        "

## 2018-12-06 NOTE — TELEPHONE ENCOUNTER
----- Message from Renato Flores MD sent at 12/6/2018  8:52 AM CST -----  Contact: pt: [phone]  I instructed him that he can get his wound wet ( or take a shower or bath) on Friday ( 2 days later from circumcision) and it will be best to remove the  Dressings after he took a shower.  For his post op either I and an MARIANNA can see him in 1 month.    Dr. Flores  ----- Message -----  From: Penelope Sousa LPN  Sent: 12/6/2018   8:49 AM  To: Renato Flores MD    ?? When can bandages come off. Can I tell him to clean around the incision. D/c note says follow up in 2 weeks-is next Tuesday morning to soon to be seen? (its your procedure morning, but you only have 3 so I am trying to put a few clinic patients on)   ----- Message -----  From: Brenda Wu  Sent: 12/6/2018   8:03 AM  To: Mark HYATT Staff    Needs Advice    Reason for call: pt stated he was told per Dr. Flores, when showering not to get incision wet pt would like to know how can he avoid area getting wet while keeping it clean, also would like to know can bandage come off         Communication Preference: pt: [phone]

## 2018-12-07 ENCOUNTER — TELEPHONE (OUTPATIENT)
Dept: UROLOGY | Facility: CLINIC | Age: 78
End: 2018-12-07

## 2018-12-07 NOTE — TELEPHONE ENCOUNTER
----- Message from Aleshia Dickson sent at 12/7/2018  3:26 PM CST -----  Contact: Self- [phone]  Flores- pt called to speak with staff- states he noticed minimal blood when he took the bandage off- please contact pt at [phone]

## 2018-12-11 ENCOUNTER — TELEPHONE (OUTPATIENT)
Dept: UROLOGY | Facility: CLINIC | Age: 78
End: 2018-12-11

## 2018-12-11 NOTE — TELEPHONE ENCOUNTER
Pt advised per dr. Mark han  to clean area with soap and water , pat dry and apply bacitracin ointment

## 2018-12-11 NOTE — TELEPHONE ENCOUNTER
----- Message from Savanah Watts MA sent at 12/11/2018  8:04 AM CST -----  Contact: [phone]  Needs Advice    Reason for call: post op cysto/circ.  Pt states that the sutures looked at (they are dissolving too fast) and there is redness at the incision site.         Communication Preference: [phone]    Additional Information: please call

## 2018-12-14 ENCOUNTER — TELEPHONE (OUTPATIENT)
Dept: UROLOGY | Facility: CLINIC | Age: 78
End: 2018-12-14

## 2018-12-14 NOTE — TELEPHONE ENCOUNTER
Pt notified that he is still healing and to clean the area with soap and water and apply bacitracin when dry.

## 2018-12-14 NOTE — TELEPHONE ENCOUNTER
----- Message from Rachel Lucas sent at 12/14/2018  8:14 AM CST -----  Contact: self [phone]  Needs Advice    Reason for call: Pt called stating he had surgery on 12/05, and he is having a couple of problems and would like to know if Dr. Flores could prescribe some medication for him         Communication Preference: self [phone]    Additional Information:

## 2018-12-17 ENCOUNTER — TELEPHONE (OUTPATIENT)
Dept: UROLOGY | Facility: CLINIC | Age: 78
End: 2018-12-17

## 2018-12-17 ENCOUNTER — OFFICE VISIT (OUTPATIENT)
Dept: UROLOGY | Facility: CLINIC | Age: 78
End: 2018-12-17
Payer: MEDICARE

## 2018-12-17 DIAGNOSIS — Z98.890 H/O CIRCUMCISION: ICD-10-CM

## 2018-12-17 PROCEDURE — 99999 PR PBB SHADOW E&M-EST. PATIENT-LVL II: CPT | Mod: PBBFAC,HCNC,, | Performed by: UROLOGY

## 2018-12-17 PROCEDURE — 99499 UNLISTED E&M SERVICE: CPT | Mod: HCNC,S$GLB,, | Performed by: UROLOGY

## 2018-12-17 NOTE — PROGRESS NOTES
"CC: penile rash, spraying of urine, anxiety and stress affecting his LUTS, hx of MH    Subjective:       Patient ID: Ajit Ayoub is a 78 y.o. male.    Chief Complaint: blister on penis (states saturday he woke up with a "quater size" blister on head of penis by urethra. states behind that blister he had drainage. no pain, but states it is hard to walk. ) and urethral irritation (using bactrim and bacitracin-worried about an allergic reaction )    Ajit Ayoub is a 78 y.o. Man with severe phimosis and balanitis.  He underwent the following surgery on 12/5/18.  Procedure(s) Performed:   1.  Circumcision   2.  Cystoscopy  Findings:   - dorsal slit performed to expose glans  - uncomplicated circumcision with only minor irritation to glans noted  - no abnormalities on cystoscopy     He is here today to check his wound.  C/o blister on the glands of the penis and redness and swelling of the penis.  Otherwise, he is happy with the outcome.    History of TURP in 2007.  His TURP he has been taking regular Bactrim (400mg) daily.  He was last seen in clinic with Dr. Flores 02/26/2018; bactrim was refilled.  He was to have a cysto for microscopic hematuria but refused.  He states had before and did not want it again  He has a lot of other pressing issues; allergic rashes.   He never sees blood when urinating.  He states that he has tried Flomax in the past and did not like; feels he allergic to it.      Lab Results       Component                Value               Date                       PSA                      2.1                 02/21/2018                    Past Medical History:  No date: Chronic prostatitis  No date: Hyperthyroidism  No date: Obesity (BMI 30-39.9)  No date: Vitamin D insufficiency    Past Surgical History:  No date: TRANSURETHRAL RESECTION OF PROSTATE    Review of patient's family history indicates:  Problem: Diabetes      Relation: Mother          Age of Onset: (Not Specified)  Problem: Heart " disease      Relation: Mother          Age of Onset: (Not Specified)  Problem: Diabetes      Relation: Father          Age of Onset: (Not Specified)  Problem: Heart disease      Relation: Father          Age of Onset: (Not Specified)  Problem: Cancer      Relation: Neg Hx          Age of Onset: (Not Specified)  Problem: Stroke      Relation: Neg Hx          Age of Onset: (Not Specified)      Social History    Socioeconomic History      Marital status: Single      Spouse name: Not on file      Number of children: Not on file      Years of education: Not on file      Highest education level: Not on file    Social Needs      Financial resource strain: Not on file      Food insecurity - worry: Not on file      Food insecurity - inability: Not on file      Transportation needs - medical: Not on file      Transportation needs - non-medical: Not on file    Occupational History      Occupation: Retired      Tobacco Use      Smoking status: Never Smoker      Smokeless tobacco: Never Used    Substance and Sexual Activity      Alcohol use: No      Drug use: No      Sexual activity: Not Currently        Partners: Female    Other Topics      Concerns:        Not on file    Social History Narrative      Not on file      Allergies:  Contrast media and Pneumoc 13-lori conj-dip cr(pf)    Medications:  Current Outpatient Medications:   ergocalciferol (ERGOCALCIFEROL) 50,000 unit Cap, , Disp: , Rfl:   ibuprofen (ADVIL,MOTRIN) 600 MG tablet, , Disp: , Rfl:   lisinopril 10 MG tablet, Take 10 mg by mouth once daily. , Disp: , Rfl:   LUMIGAN 0.01 % Drop, , Disp: , Rfl:   sulfamethoxazole-trimethoprim 400-80mg (BACTRIM) 400-80 mg per tablet, Take 1 tablet by mouth once daily., Disp: 90 tablet, Rfl: 3  sulfamethoxazole-trimethoprim 800-160mg (BACTRIM DS) 800-160 mg Tab, , Disp: , Rfl:         Penis Pain   The patient's primary symptoms include penile pain. The patient's pertinent negatives include no scrotal swelling  or testicular pain. Associated symptoms include a rash. Pertinent negatives include no abdominal pain, chest pain, chills, constipation, diarrhea, dysuria, fever, flank pain, nausea, shortness of breath or vomiting. There is no history of erectile dysfunction.     Review of Systems   Constitutional: Negative for activity change, appetite change, chills and fever.   HENT: Negative for facial swelling and trouble swallowing.    Eyes: Negative for visual disturbance.   Respiratory: Negative for chest tightness and shortness of breath.    Cardiovascular: Negative for chest pain and palpitations.   Gastrointestinal: Negative.  Negative for abdominal pain, constipation, diarrhea, nausea and vomiting.   Genitourinary: Positive for penile pain. Negative for difficulty urinating, dysuria, flank pain, hematuria, penile swelling, scrotal swelling and testicular pain.        Today FOS is good.  Nocturia 0-1x     Musculoskeletal: Negative for back pain, gait problem, myalgias and neck stiffness.   Skin: Positive for rash.   Neurological: Negative for dizziness and speech difficulty.   Hematological: Does not bruise/bleed easily.   Psychiatric/Behavioral: Negative for behavioral problems. The patient is nervous/anxious.        Objective:      Physical Exam   Nursing note and vitals reviewed.  Constitutional: He is oriented to person, place, and time. He appears well-developed and well-nourished. He is active and cooperative.  Non-toxic appearance. He does not have a sickly appearance. No distress.   Urine dipped clear of infection.   Microscopic hematuria.     HENT:   Head: Normocephalic and atraumatic.   Right Ear: External ear normal.   Left Ear: External ear normal.   Nose: Nose normal.   Mouth/Throat: Oropharynx is clear and moist and mucous membranes are normal.   Eyes: Conjunctivae and lids are normal. Pupils are equal, round, and reactive to light. No scleral icterus.   Neck: Trachea normal, normal range of motion and full  passive range of motion without pain. Neck supple. No JVD present. No tracheal deviation present. No thyromegaly present.   Cardiovascular: Normal rate, regular rhythm, S1 normal, S2 normal, normal heart sounds and intact distal pulses.  Exam reveals no gallop and no friction rub.    No murmur heard.  Pulmonary/Chest: Effort normal and breath sounds normal. No respiratory distress. He has no wheezes. He exhibits no tenderness.   Abdominal: Soft. Normal appearance and bowel sounds are normal. He exhibits no distension and no mass. There is no hepatosplenomegaly. There is no tenderness. There is no rebound, no guarding and no CVA tenderness.   Genitourinary: Rectum normal and testes normal. Right testis shows no mass, no swelling and no tenderness. Left testis shows no mass, no swelling and no tenderness. Uncircumcised. Penile tenderness present. No phimosis, paraphimosis, hypospadias or penile erythema. No discharge found.       Genitourinary Comments: His entire penis skin is slightly more edematous with rash.  The redundant foreskin is severe inflamed swollen.  Unable to retract its foreskin and able to find urethral meatus.  The scrotum: positive rash with redness.  No evidence of cellulites noted.     Musculoskeletal: Normal range of motion. He exhibits no edema, tenderness or deformity.   Lymphadenopathy:     He has no cervical adenopathy. No inguinal adenopathy noted on the right or left side.   Neurological: He is alert and oriented to person, place, and time. He has normal strength.   Skin: Skin is warm, dry and intact. Lesion noted. No rash noted. He is not diaphoretic. No erythema.     (+) erythema to his arms.     Psychiatric: He has a normal mood and affect. His behavior is normal. Judgment and thought content normal.         US 9/20/18  Sonographic findings consistent with medical renal disease.    Bilateral renal simple and minimally complex cysts.    Right renal nonobstructing nephrolith.    UA 50 +  blood    Assessment:       H/O circumcision      Plan:           Wound is healing well.  Mild swelling expected from his circumcision.  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  Reassurance given.  Recommend that he can take a bath and wash his genital area clean and dry.    Follow up:  Follow-up if symptoms worsen or fail to improve.

## 2018-12-17 NOTE — TELEPHONE ENCOUNTER
Pt called asking to be seen today for a blister on penis. He was added to dr. Flores's schedule at [place] this morning

## 2018-12-17 NOTE — TELEPHONE ENCOUNTER
----- Message from Renato Flores MD sent at 12/16/2018  9:55 PM CST -----  He can be seen by an MARIANNA this week and cancel his appt with me in January    ----- Message -----  From: Mamadou Spaulding MD  Sent: 12/15/2018   3:03 PM  To: Renato Flores MD, [facility] Staff    This patient has called quite a few times about his incision. It doesn't sound concerning. He may benefit from an office visit for reassurance and wound care instruction. Maybe with an MARIANNA?

## 2018-12-20 ENCOUNTER — TELEPHONE (OUTPATIENT)
Dept: UROLOGY | Facility: CLINIC | Age: 78
End: 2018-12-20

## 2018-12-20 DIAGNOSIS — N48.1 BALANITIS: Primary | ICD-10-CM

## 2018-12-20 RX ORDER — CLOTRIMAZOLE 1 %
CREAM (GRAM) TOPICAL 2 TIMES DAILY
Qty: 1 TUBE | Refills: 1 | Status: SHIPPED | OUTPATIENT
Start: 2018-12-20 | End: 2023-04-21

## 2018-12-20 NOTE — TELEPHONE ENCOUNTER
----- Message from Penelope Sousa LPN sent at 12/20/2018 10:16 AM CST -----  Contact: pt: [phone]  He has called 3 times already . Can he put bacitracin on it. He told phone staff he wants to come in????  ----- Message -----  From: Brenda Wu  Sent: 12/20/2018   9:00 AM  To: [facility] Staff    Needs Advice    Reason for call: pt stated his surgery incision is scab like and would like to know should he get it looked at         Communication Preference: pt: [phone]

## 2018-12-20 NOTE — TELEPHONE ENCOUNTER
Balanitis  -     clotrimazole (LOTRIMIN) 1 % cream; Apply topically 2 (two) times daily. Apply to the affected area twice a day  Dispense: 1 Tube; Refill: 1    keep the penis dry and clean.  He can use lotrimin cream to the area of concern.

## 2019-03-08 ENCOUNTER — TELEPHONE (OUTPATIENT)
Dept: INTERNAL MEDICINE | Facility: CLINIC | Age: 79
End: 2019-03-08

## 2019-03-08 DIAGNOSIS — Z12.5 PROSTATE CANCER SCREENING: ICD-10-CM

## 2019-03-08 DIAGNOSIS — R73.09 ELEVATED GLUCOSE: ICD-10-CM

## 2019-03-08 DIAGNOSIS — I10 BENIGN HYPERTENSION: Primary | ICD-10-CM

## 2019-03-08 DIAGNOSIS — E55.9 VITAMIN D DEFICIENCY: ICD-10-CM

## 2019-03-08 NOTE — TELEPHONE ENCOUNTER
----- Message from Maricel Stallworth sent at 3/7/2019  3:25 PM CST -----  Contact: Self [phone]  Patient has an upcoming lab appointment at [facility] on 03/09    Please enter lab orders and link them to this appointment.    Thanks,  [facility] 5th floor reception desk

## 2019-03-09 ENCOUNTER — LAB VISIT (OUTPATIENT)
Dept: LAB | Facility: HOSPITAL | Age: 79
End: 2019-03-09
Attending: INTERNAL MEDICINE
Payer: MEDICARE

## 2019-03-09 DIAGNOSIS — I10 BENIGN HYPERTENSION: ICD-10-CM

## 2019-03-09 DIAGNOSIS — E55.9 VITAMIN D DEFICIENCY: ICD-10-CM

## 2019-03-09 DIAGNOSIS — Z12.5 PROSTATE CANCER SCREENING: ICD-10-CM

## 2019-03-09 DIAGNOSIS — R73.09 ELEVATED GLUCOSE: ICD-10-CM

## 2019-03-09 LAB
25(OH)D3+25(OH)D2 SERPL-MCNC: 43 NG/ML
ALBUMIN SERPL BCP-MCNC: 4.2 G/DL
ALP SERPL-CCNC: 84 U/L
ALT SERPL W/O P-5'-P-CCNC: 12 U/L
ANION GAP SERPL CALC-SCNC: 7 MMOL/L
AST SERPL-CCNC: 26 U/L
BASOPHILS # BLD AUTO: 0.04 K/UL
BASOPHILS NFR BLD: 0.5 %
BILIRUB SERPL-MCNC: 0.7 MG/DL
BUN SERPL-MCNC: 23 MG/DL
CALCIUM SERPL-MCNC: 10 MG/DL
CHLORIDE SERPL-SCNC: 105 MMOL/L
CHOLEST SERPL-MCNC: 187 MG/DL
CHOLEST/HDLC SERPL: 3.6 {RATIO}
CO2 SERPL-SCNC: 30 MMOL/L
COMPLEXED PSA SERPL-MCNC: 1.8 NG/ML
CREAT SERPL-MCNC: 0.8 MG/DL
DIFFERENTIAL METHOD: ABNORMAL
EOSINOPHIL # BLD AUTO: 0.1 K/UL
EOSINOPHIL NFR BLD: 1.6 %
ERYTHROCYTE [DISTWIDTH] IN BLOOD BY AUTOMATED COUNT: 12 %
EST. GFR  (AFRICAN AMERICAN): >60 ML/MIN/1.73 M^2
EST. GFR  (NON AFRICAN AMERICAN): >60 ML/MIN/1.73 M^2
ESTIMATED AVG GLUCOSE: 120 MG/DL
GLUCOSE SERPL-MCNC: 107 MG/DL
HBA1C MFR BLD HPLC: 5.8 %
HCT VFR BLD AUTO: 45.4 %
HDLC SERPL-MCNC: 52 MG/DL
HDLC SERPL: 27.8 %
HGB BLD-MCNC: 15.1 G/DL
IMM GRANULOCYTES # BLD AUTO: 0.04 K/UL
IMM GRANULOCYTES NFR BLD AUTO: 0.5 %
LDLC SERPL CALC-MCNC: 120.2 MG/DL
LYMPHOCYTES # BLD AUTO: 1.8 K/UL
LYMPHOCYTES NFR BLD: 24.1 %
MCH RBC QN AUTO: 33.9 PG
MCHC RBC AUTO-ENTMCNC: 33.3 G/DL
MCV RBC AUTO: 102 FL
MONOCYTES # BLD AUTO: 0.6 K/UL
MONOCYTES NFR BLD: 7.6 %
NEUTROPHILS # BLD AUTO: 4.9 K/UL
NEUTROPHILS NFR BLD: 65.7 %
NONHDLC SERPL-MCNC: 135 MG/DL
NRBC BLD-RTO: 0 /100 WBC
PLATELET # BLD AUTO: 168 K/UL
PMV BLD AUTO: 10.8 FL
POTASSIUM SERPL-SCNC: 4.5 MMOL/L
PROT SERPL-MCNC: 7 G/DL
RBC # BLD AUTO: 4.46 M/UL
SODIUM SERPL-SCNC: 142 MMOL/L
TRIGL SERPL-MCNC: 74 MG/DL
TSH SERPL DL<=0.005 MIU/L-ACNC: 0.98 UIU/ML
WBC # BLD AUTO: 7.47 K/UL

## 2019-03-09 PROCEDURE — 80053 COMPREHEN METABOLIC PANEL: CPT | Mod: HCNC

## 2019-03-09 PROCEDURE — 83036 HEMOGLOBIN GLYCOSYLATED A1C: CPT | Mod: HCNC

## 2019-03-09 PROCEDURE — 85025 COMPLETE CBC W/AUTO DIFF WBC: CPT | Mod: HCNC

## 2019-03-09 PROCEDURE — 84443 ASSAY THYROID STIM HORMONE: CPT | Mod: HCNC

## 2019-03-09 PROCEDURE — 84153 ASSAY OF PSA TOTAL: CPT | Mod: HCNC

## 2019-03-09 PROCEDURE — 82306 VITAMIN D 25 HYDROXY: CPT | Mod: HCNC

## 2019-03-09 PROCEDURE — 36415 COLL VENOUS BLD VENIPUNCTURE: CPT | Mod: HCNC,PO

## 2019-03-09 PROCEDURE — 80061 LIPID PANEL: CPT | Mod: HCNC

## 2019-03-12 ENCOUNTER — OFFICE VISIT (OUTPATIENT)
Dept: INTERNAL MEDICINE | Facility: CLINIC | Age: 79
End: 2019-03-12
Payer: MEDICARE

## 2019-03-12 VITALS
SYSTOLIC BLOOD PRESSURE: 138 MMHG | WEIGHT: 174 LBS | RESPIRATION RATE: 16 BRPM | HEIGHT: 65 IN | TEMPERATURE: 98 F | BODY MASS INDEX: 28.99 KG/M2 | DIASTOLIC BLOOD PRESSURE: 88 MMHG | HEART RATE: 80 BPM

## 2019-03-12 DIAGNOSIS — E66.9 OBESITY (BMI 30-39.9): ICD-10-CM

## 2019-03-12 DIAGNOSIS — Z00.00 ANNUAL PHYSICAL EXAM: Primary | ICD-10-CM

## 2019-03-12 DIAGNOSIS — N41.1 CHRONIC PROSTATITIS: ICD-10-CM

## 2019-03-12 DIAGNOSIS — I10 ESSENTIAL HYPERTENSION: ICD-10-CM

## 2019-03-12 PROBLEM — E55.9 VITAMIN D INSUFFICIENCY: Status: RESOLVED | Noted: 2018-02-02 | Resolved: 2019-03-12

## 2019-03-12 PROCEDURE — 3075F SYST BP GE 130 - 139MM HG: CPT | Mod: HCNC,CPTII,S$GLB, | Performed by: INTERNAL MEDICINE

## 2019-03-12 PROCEDURE — 99999 PR PBB SHADOW E&M-EST. PATIENT-LVL III: ICD-10-PCS | Mod: PBBFAC,HCNC,, | Performed by: INTERNAL MEDICINE

## 2019-03-12 PROCEDURE — 99999 PR PBB SHADOW E&M-EST. PATIENT-LVL III: CPT | Mod: PBBFAC,HCNC,, | Performed by: INTERNAL MEDICINE

## 2019-03-12 PROCEDURE — 93000 EKG 12-LEAD: ICD-10-PCS | Mod: HCNC,S$GLB,, | Performed by: INTERNAL MEDICINE

## 2019-03-12 PROCEDURE — 3079F PR MOST RECENT DIASTOLIC BLOOD PRESSURE 80-89 MM HG: ICD-10-PCS | Mod: HCNC,CPTII,S$GLB, | Performed by: INTERNAL MEDICINE

## 2019-03-12 PROCEDURE — 93000 ELECTROCARDIOGRAM COMPLETE: CPT | Mod: HCNC,S$GLB,, | Performed by: INTERNAL MEDICINE

## 2019-03-12 PROCEDURE — 99397 PR PREVENTIVE VISIT,EST,65 & OVER: ICD-10-PCS | Mod: HCNC,S$GLB,, | Performed by: INTERNAL MEDICINE

## 2019-03-12 PROCEDURE — 99397 PER PM REEVAL EST PAT 65+ YR: CPT | Mod: HCNC,S$GLB,, | Performed by: INTERNAL MEDICINE

## 2019-03-12 PROCEDURE — 3079F DIAST BP 80-89 MM HG: CPT | Mod: HCNC,CPTII,S$GLB, | Performed by: INTERNAL MEDICINE

## 2019-03-12 PROCEDURE — 3075F PR MOST RECENT SYSTOLIC BLOOD PRESS GE 130-139MM HG: ICD-10-PCS | Mod: HCNC,CPTII,S$GLB, | Performed by: INTERNAL MEDICINE

## 2019-03-12 RX ORDER — ALPRAZOLAM 0.25 MG/1
0.25 TABLET ORAL 2 TIMES DAILY PRN
Qty: 60 TABLET | Refills: 0 | Status: SHIPPED | OUTPATIENT
Start: 2019-03-12 | End: 2019-09-26 | Stop reason: SDUPTHER

## 2019-03-12 RX ORDER — LISINOPRIL 20 MG/1
20 TABLET ORAL DAILY
Qty: 90 TABLET | Refills: 3 | Status: SHIPPED | OUTPATIENT
Start: 2019-03-12 | End: 2019-11-05

## 2019-03-12 NOTE — PROGRESS NOTES
Subjective:       Patient ID: Ajit Ayoub is a 78 y.o. male.    Chief Complaint: Annual Exam (with labs to review, printed copy at his request. 0 pain., neighbor bothers him with floor sqeeking wants nerve pill.  )    HPI   78 y.o. Male here for annual exam.      Vaccines: Influenza (2018); Tetanus (2014); Pneumovax (2018); Shingrix (will get)  Eye exam: 2018  Colonoscopy: 2011     Exercise: no  Diet: regular     Past Medical History:  No date: Chronic prostatitis  No date: Hyperthyroidism  No date: Obesity (BMI 30-39.9)  No date: Vitamin D insufficiency  Past Surgical History:  No date: TRANSURETHRAL RESECTION OF PROSTATE  Social History    Marital status: Single              Spouse name:                       Years of education:                 Number of children:                Occupational History  Occupation          Employer            Comment               Retired warehouse *                          Social History Main Topics    Smoking status: Never Smoker                                                                 Smokeless tobacco: Never Used                        Alcohol use: No              Drug use: No              Sexual activity: Not Currently     Partners with: Female     Review of patient's allergies indicates:  No Known Allergies  Review of Systems   Constitutional: Negative for activity change, appetite change, chills, diaphoresis, fatigue, fever and unexpected weight change.   HENT: Negative for congestion, mouth sores, postnasal drip, rhinorrhea, sinus pressure, sneezing, sore throat, trouble swallowing and voice change.    Eyes: Negative for discharge, itching and visual disturbance.   Respiratory: Negative for cough, chest tightness, shortness of breath and wheezing.    Cardiovascular: Negative for chest pain, palpitations and leg swelling.   Gastrointestinal: Negative for abdominal pain, blood in stool, constipation, diarrhea, nausea and vomiting.   Endocrine: Negative for cold  intolerance and heat intolerance.   Genitourinary: Negative for difficulty urinating, dysuria, flank pain, hematuria and urgency.   Musculoskeletal: Negative for arthralgias, back pain, myalgias and neck pain.   Skin: Negative for rash and wound.   Allergic/Immunologic: Negative for environmental allergies and food allergies.   Neurological: Negative for dizziness, tremors, seizures, syncope, weakness and headaches.   Hematological: Negative for adenopathy. Does not bruise/bleed easily.   Psychiatric/Behavioral: Negative for confusion, sleep disturbance and suicidal ideas. The patient is not nervous/anxious.        Objective:      Physical Exam   Constitutional: He is oriented to person, place, and time. He appears well-developed and well-nourished. No distress.   HENT:   Head: Normocephalic and atraumatic.   Right Ear: External ear normal.   Left Ear: External ear normal.   Nose: Nose normal.   Mouth/Throat: Oropharynx is clear and moist. No oropharyngeal exudate.   Eyes: Conjunctivae and EOM are normal. Pupils are equal, round, and reactive to light. Right eye exhibits no discharge. Left eye exhibits no discharge. No scleral icterus.   Neck: Normal range of motion. Neck supple. No JVD present. No thyromegaly present.   Cardiovascular: Normal rate, regular rhythm, normal heart sounds and intact distal pulses.   No murmur heard.  Pulmonary/Chest: Effort normal and breath sounds normal. No respiratory distress. He has no wheezes. He has no rales.   Abdominal: Soft. Bowel sounds are normal. He exhibits no distension. There is no tenderness. There is no guarding.   Musculoskeletal: He exhibits no edema.   Lymphadenopathy:     He has no cervical adenopathy.   Neurological: He is alert and oriented to person, place, and time.   Skin: Skin is warm and dry. No rash noted. He is not diaphoretic. No pallor.   Psychiatric: He has a normal mood and affect. Judgment normal.       Assessment:       1. Annual physical exam    2.  Essential hypertension    3. Chronic prostatitis    4. Obesity (BMI 30-39.9)        Plan:    1. Blood work reviewed with pt       Vaccines: Influenza (2018); Tetanus (2014); Pneumovax (2018); Shingrix (will get)       Eye exam: 2018       Colonoscopy: 2011   2. HTN- increase Lisinopril to 20 mg daily    3. Chronic prostatitis- pt has seen Urology    4. Obesity- pt advised on proper diet/exercise for weight loss   5. Hx of basal cell carcinoma on face- followed by Derm   6. F/u in 1 yr

## 2019-06-25 ENCOUNTER — TELEPHONE (OUTPATIENT)
Dept: DERMATOLOGY | Facility: CLINIC | Age: 79
End: 2019-06-25

## 2019-06-25 NOTE — TELEPHONE ENCOUNTER
Returned pt's call. No answer. Left voice message.----- Message from Kathleen Abdullahi sent at 6/25/2019 11:28 AM CDT -----  Contact: pt at [phone]  Needs Advice    Reason for call:  NP  With contact dermatitis for 5 yrs off and on and skin ca ck also wants to see whoever can see him soonest at [place] location.        Communication Preference:call pt at [phone]    Additional Information:

## 2019-06-25 NOTE — TELEPHONE ENCOUNTER
Returned pt's call. Offered pt Dr. Dent's next available. Pt accepted appt time and thanked me for my call.----- Message from Savanah Mosquera sent at 6/25/2019  3:15 PM CDT -----  Contact: pt   Patient Returning Call from [facility]    Who Left Message for Patient: pt is returning Darinyns call   Communication Preference: can you please call pt at [phone]  Additional Information: none    YASMANI

## 2019-08-02 ENCOUNTER — OFFICE VISIT (OUTPATIENT)
Dept: DERMATOLOGY | Facility: CLINIC | Age: 79
End: 2019-08-02
Payer: MEDICARE

## 2019-08-02 VITALS — BODY MASS INDEX: 28.96 KG/M2 | WEIGHT: 174 LBS

## 2019-08-02 DIAGNOSIS — L82.1 SK (SEBORRHEIC KERATOSIS): ICD-10-CM

## 2019-08-02 DIAGNOSIS — Z85.828 HISTORY OF SKIN CANCER: ICD-10-CM

## 2019-08-02 DIAGNOSIS — L57.0 ACTINIC KERATOSIS: ICD-10-CM

## 2019-08-02 DIAGNOSIS — L81.7 SCHAMBERG'S CAPILLARITIS: Primary | ICD-10-CM

## 2019-08-02 DIAGNOSIS — L81.4 LENTIGINES: ICD-10-CM

## 2019-08-02 PROCEDURE — 17000 DESTRUCT PREMALG LESION: CPT | Mod: HCNC,S$GLB,, | Performed by: DERMATOLOGY

## 2019-08-02 PROCEDURE — 99202 PR OFFICE/OUTPT VISIT, NEW, LEVL II, 15-29 MIN: ICD-10-PCS | Mod: 25,HCNC,S$GLB, | Performed by: DERMATOLOGY

## 2019-08-02 PROCEDURE — 1101F PT FALLS ASSESS-DOCD LE1/YR: CPT | Mod: HCNC,CPTII,S$GLB, | Performed by: DERMATOLOGY

## 2019-08-02 PROCEDURE — 17000 PR DESTRUCTION(LASER SURGERY,CRYOSURGERY,CHEMOSURGERY),PREMALIGNANT LESIONS,FIRST LESION: ICD-10-PCS | Mod: HCNC,S$GLB,, | Performed by: DERMATOLOGY

## 2019-08-02 PROCEDURE — 99999 PR PBB SHADOW E&M-EST. PATIENT-LVL III: ICD-10-PCS | Mod: PBBFAC,HCNC,, | Performed by: DERMATOLOGY

## 2019-08-02 PROCEDURE — 99202 OFFICE O/P NEW SF 15 MIN: CPT | Mod: 25,HCNC,S$GLB, | Performed by: DERMATOLOGY

## 2019-08-02 PROCEDURE — 1101F PR PT FALLS ASSESS DOC 0-1 FALLS W/OUT INJ PAST YR: ICD-10-PCS | Mod: HCNC,CPTII,S$GLB, | Performed by: DERMATOLOGY

## 2019-08-02 PROCEDURE — 99999 PR PBB SHADOW E&M-EST. PATIENT-LVL III: CPT | Mod: PBBFAC,HCNC,, | Performed by: DERMATOLOGY

## 2019-08-02 NOTE — PROGRESS NOTES
Subjective:       Patient ID:  Ajit Ayoub is a 79 y.o. male who presents for   Chief Complaint   Patient presents with    Skin Check     upper and lower exam     Spot     lower legs, discoloration on back      This is a high risk patient here to check for the development of new lesions.  Previously seen by dr Banuelos and dr [facility], has hx of mohs for bcc on right cheek.    History of Present Illness: The patient presents with chief complaint of spot.  Location: right hand  Duration: months  Signs/Symptoms: none    Prior treatments: none  Also rash on legs used cerave no better not painful       Spot  - Initial  Affected locations: left upper leg, right upper leg, left lower leg, right lower leg, left arm, right arm, face and back  Signs / symptoms: itching  Aggravated by: nothing        Review of Systems   Constitutional: Negative for fever, chills, weight loss, weight gain, fatigue, night sweats and malaise.   Skin: Positive for activity-related sunscreen use, sensitivity to antibiotic ointment and wears hat.   Hematologic/Lymphatic: Bruises/bleeds easily.        Objective:    Physical Exam   Constitutional: He appears well-developed and well-nourished. No distress.   Neurological: He is alert and oriented to person, place, and time. He is not disoriented.   Psychiatric: He has a normal mood and affect.   Skin:   Areas Examined (abnormalities noted in diagram):   Head / Face Inspection Performed  Neck Inspection Performed  Chest / Axilla Inspection Performed  Back Inspection Performed  RUE Inspected  LUE Inspection Performed  RLE Inspected  LLE Inspection Performed                   Diagram Legend     Erythematous scaling macule/papule c/w actinic keratosis       Vascular papule c/w angioma      Pigmented verrucoid papule/plaque c/w seborrheic keratosis      Yellow umbilicated papule c/w sebaceous hyperplasia      Irregularly shaped tan macule c/w lentigo     1-2 mm smooth white papules consistent with  "Milia      Movable subcutaneous cyst with punctum c/w epidermal inclusion cyst      Subcutaneous movable cyst c/w pilar cyst      Firm pink to brown papule c/w dermatofibroma      Pedunculated fleshy papule(s) c/w skin tag(s)      Evenly pigmented macule c/w junctional nevus     Mildly variegated pigmented, slightly irregular-bordered macule c/w mildly atypical nevus      Flesh colored to evenly pigmented papule c/w intradermal nevus       Pink pearly papule/plaque c/w basal cell carcinoma      Erythematous hyperkeratotic cursted plaque c/w SCC      Surgical scar with no sign of skin cancer recurrence      Open and closed comedones      Inflammatory papules and pustules      Verrucoid papule consistent consistent with wart     Erythematous eczematous patches and plaques     Dystrophic onycholytic nail with subungual debris c/w onychomycosis     Umbilicated papule    Erythematous-base heme-crusted tan verrucoid plaque consistent with inflamed seborrheic keratosis     Erythematous Silvery Scaling Plaque c/w Psoriasis     See annotation      Assessment / Plan:        Schamberg's capillaritis  Leg elevation support socks    Actinic keratosis   Cryosurgery Procedure Note    Verbal consent from the patient is obtained and the patient is aware of the precancerous quality and need for treatment of these lesions. Liquid nitrogen cryosurgery is applied to the 1 actinic keratoses, as detailed in the physical exam, to produce a freeze injury.      History of skin cancer  bcc right cheek     Lentigines  The "ABCD" rules to observe pigmented lesions were reviewed.    Seborrheic keratosis right arm               Follow up in about 1 year (around 8/2/2020).  "

## 2019-09-26 RX ORDER — ALPRAZOLAM 0.25 MG/1
TABLET ORAL
Qty: 60 TABLET | Refills: 1 | Status: SHIPPED | OUTPATIENT
Start: 2019-09-26 | End: 2020-03-23

## 2019-10-17 ENCOUNTER — PATIENT OUTREACH (OUTPATIENT)
Dept: ADMINISTRATIVE | Facility: OTHER | Age: 79
End: 2019-10-17

## 2019-10-22 ENCOUNTER — OFFICE VISIT (OUTPATIENT)
Dept: OPTOMETRY | Facility: CLINIC | Age: 79
End: 2019-10-22
Payer: MEDICARE

## 2019-10-22 DIAGNOSIS — H01.02B SQUAMOUS BLEPHARITIS OF UPPER AND LOWER EYELIDS OF BOTH EYES: ICD-10-CM

## 2019-10-22 DIAGNOSIS — H01.02A SQUAMOUS BLEPHARITIS OF UPPER AND LOWER EYELIDS OF BOTH EYES: ICD-10-CM

## 2019-10-22 DIAGNOSIS — H25.10 NUCLEAR SCLEROSIS, UNSPECIFIED LATERALITY: ICD-10-CM

## 2019-10-22 DIAGNOSIS — H40.1131 PRIMARY OPEN ANGLE GLAUCOMA (POAG) OF BOTH EYES, MILD STAGE: Primary | ICD-10-CM

## 2019-10-22 DIAGNOSIS — H52.7 REFRACTIVE ERROR: ICD-10-CM

## 2019-10-22 PROCEDURE — 99999 PR PBB SHADOW E&M-EST. PATIENT-LVL II: ICD-10-PCS | Mod: PBBFAC,HCNC,, | Performed by: OPTOMETRIST

## 2019-10-22 PROCEDURE — 92004 COMPRE OPH EXAM NEW PT 1/>: CPT | Mod: HCNC,S$GLB,, | Performed by: OPTOMETRIST

## 2019-10-22 PROCEDURE — 92133 POSTERIOR SEGMENT OCT OPTIC NERVE(OCULAR COHERENCE TOMOGRAPHY) - OU - BOTH EYES: ICD-10-PCS | Mod: HCNC,S$GLB,, | Performed by: OPTOMETRIST

## 2019-10-22 PROCEDURE — 92004 PR EYE EXAM, NEW PATIENT,COMPREHESV: ICD-10-PCS | Mod: HCNC,S$GLB,, | Performed by: OPTOMETRIST

## 2019-10-22 PROCEDURE — 92015 DETERMINE REFRACTIVE STATE: CPT | Mod: HCNC,S$GLB,, | Performed by: OPTOMETRIST

## 2019-10-22 PROCEDURE — 99999 PR PBB SHADOW E&M-EST. PATIENT-LVL II: CPT | Mod: PBBFAC,HCNC,, | Performed by: OPTOMETRIST

## 2019-10-22 PROCEDURE — 92015 PR REFRACTION: ICD-10-PCS | Mod: HCNC,S$GLB,, | Performed by: OPTOMETRIST

## 2019-10-22 PROCEDURE — 92133 CPTRZD OPH DX IMG PST SGM ON: CPT | Mod: HCNC,S$GLB,, | Performed by: OPTOMETRIST

## 2019-10-22 RX ORDER — PREDNISOLONE ACETATE 10 MG/ML
SUSPENSION/ DROPS OPHTHALMIC
Refills: 0 | COMMUNITY
Start: 2019-08-29 | End: 2020-06-17

## 2019-10-22 NOTE — PROGRESS NOTES
JOSÉ LUIS SIMPSON about 5 weeks ago with Dr. Palmer.  Patient has had multiple lasers   OU for glaucoma.  Patient states OS droops for the past 3 or 4 months.    Patient was using prednisolone OS BID for discharge and states he has   seasonal allergies.  Patient states corners of eyes itch a little, water   and has crusting on lids.  Patient had tried multiple drops and states he   was allergic to all of them. Patient would like second opinion for   glaucoma. Glasses about 7 or 8 yrs. Old.  Trouble reading words on TV and   distance vision is blurred.  Patient was told he has cataracts.    Last edited by Manda Jaeger on 10/22/2019  8:39 AM. (History)            Assessment /Plan     For exam results, see Encounter Report.    Primary open angle glaucoma (POAG) of both eyes, mild stage    Nuclear sclerosis, unspecified laterality    Squamous blepharitis of upper and lower eyelids of both eyes    Refractive error      1. Pt of Dr. Palmer, waiting on another laser for right eye, IOP fine for nerve for now, OCT today, Prev allergies to eyedrops, RTC 1 month IOP ck, pachy and gonio.   2. Educated pt on presence of cataracts and effects on vision. No surgery at this time. Recheck in one year.  3. Start Ocusoft lid wipes daily and art tears 2x/day. RTC 1 month follow up     4. New Spec Rx given. Different lens options discussed with patient. RTC 1 year full exam.

## 2019-10-30 ENCOUNTER — HOSPITAL ENCOUNTER (EMERGENCY)
Facility: HOSPITAL | Age: 79
Discharge: HOME | End: 2019-10-30
Attending: EMERGENCY MEDICINE
Payer: MEDICARE

## 2019-10-30 VITALS
DIASTOLIC BLOOD PRESSURE: 80 MMHG | SYSTOLIC BLOOD PRESSURE: 167 MMHG | RESPIRATION RATE: 18 BRPM | HEART RATE: 74 BPM | TEMPERATURE: 98 F | OXYGEN SATURATION: 99 %

## 2019-10-30 DIAGNOSIS — S01.81XA FACIAL LACERATION, INITIAL ENCOUNTER: ICD-10-CM

## 2019-10-30 DIAGNOSIS — S00.83XA CONTUSION OF FOREHEAD, INITIAL ENCOUNTER: Primary | ICD-10-CM
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
## 2019-10-30 PROCEDURE — 99284 PR EMERGENCY DEPT VISIT,LEVEL IV: ICD-10-PCS | Mod: HCNC,,, | Performed by: PHYSICIAN ASSISTANT

## 2019-10-30 PROCEDURE — 99284 EMERGENCY DEPT VISIT MOD MDM: CPT | Mod: 25,HCNC

## 2019-10-30 PROCEDURE — 99284 EMERGENCY DEPT VISIT MOD MDM: CPT | Mod: HCNC,,, | Performed by: PHYSICIAN ASSISTANT

## 2019-10-30 NOTE — ED PROVIDER NOTES
Encounter Date: 10/30/2019       History     Chief Complaint   Patient presents with    Fall     trip and fell on rug, hit head and left knee, no LOC, no blood thinners, lac to head that is bandaged, no neck or back pain      This is a 79-year-old male with a past medical history of HTN, hyperthyroidism, glaucoma who presents to the ED with a chief complaint of head injury s/p fall.  He reports a mechanical slip and fall from standing height when ambulating through a door way over an uneven rug.  Patient reports striking his face and landing on his hands and knees.  He denies loss of consciousness.  He sustained superficial lacerations over the right eyebrow and a forehead contusion.  He is also complaining of mild pain to the left knee.  He denies headache, vision changes, neck pain/stiffness, low back pain, chest pain, vomiting, abdominal pain, extremity weakness or numbness.  He is not on any oral anticoagulants including aspirin. Tetanus is up to date.        Review of patient's allergies indicates:   Allergen Reactions    Contrast media Rash    Pneumoc 13-lori conj-dip cr(pf) Rash     Past Medical History:   Diagnosis Date    Cataract     Chronic prostatitis     Glaucoma     Hypertension     Hyperthyroidism     pt states he has a fatty tumor    Obesity (BMI 30-39.9)     Vitamin D insufficiency      Past Surgical History:   Procedure Laterality Date    CIRCUMCISION N/A 12/5/2018    Procedure: CIRCUMCISION;  Surgeon: Renato Flores MD;  Location: [address];  Service: Urology;  Laterality: N/A;  1.5 hour    CYSTOSCOPY N/A 12/5/2018    Procedure: CYSTOSCOPY;  Surgeon: Renato Flores MD;  Location: [facility] OR [address] [place] [address];  Service: Urology;  Laterality: N/A;    TRANSURETHRAL RESECTION OF PROSTATE       Family History   Problem Relation Age of Onset    Diabetes Mother     Heart disease Mother     Diabetes Father     Heart disease Father     Stroke Paternal Grandfather     Cancer Neg Hx     Glaucoma  Neg Hx     Cataracts Neg Hx     Blindness Neg Hx     Amblyopia Neg Hx     Hypertension Neg Hx     Macular degeneration Neg Hx     Retinal detachment Neg Hx     Strabismus Neg Hx     Thyroid disease Neg Hx      Social History     Tobacco Use    Smoking status: Never Smoker    Smokeless tobacco: Never Used   Substance Use Topics    Alcohol use: No    Drug use: No     Review of Systems   Constitutional: Negative for chills and fever.   HENT: Negative for sore throat.    Respiratory: Negative for shortness of breath.    Cardiovascular: Negative for chest pain.   Gastrointestinal: Negative for nausea and vomiting.   Genitourinary: Negative for dysuria.   Musculoskeletal: Positive for arthralgias and joint swelling. Negative for back pain.   Skin: Positive for wound.   Neurological: Negative for weakness.   Hematological: Does not bruise/bleed easily.       Physical Exam     Initial Vitals [10/30/19 0951]   BP Pulse Resp Temp SpO2   (!) 162/94 86 18 98 °F (36.7 °C) 97 %      MAP       --         Physical Exam    Constitutional: He appears well-developed and well-nourished. No distress.   HENT:   Head: Head is with contusion and with laceration.       Right Ear: Tympanic membrane and ear canal normal.   Left Ear: Tympanic membrane and ear canal normal.   3cm superficial laceration over right eyebrow  2cm superficial laceration over right eyebrow   Eyes: Conjunctivae and EOM are normal. Pupils are equal, round, and reactive to light.   Neck: Normal range of motion and full passive range of motion without pain. Neck supple.   Cardiovascular: Normal rate, regular rhythm and normal heart sounds.   Pulmonary/Chest: Breath sounds normal. No respiratory distress. He has no wheezes. He has no rhonchi. He has no rales.   Abdominal: Soft. Bowel sounds are normal. There is no tenderness. There is no rebound and no guarding.   Musculoskeletal:        Left knee: He exhibits swelling. He exhibits normal range of motion, no  LCL laxity and no MCL laxity. Tenderness found.   Neurological: He is alert and oriented to person, place, and time.   Skin: Skin is warm and dry. No rash noted.         ED Course   Procedures  Labs Reviewed - No data to display       Imaging Results          CT Head Without Contrast (Final result)  Result time 10/30/19 12:29:20    Final result by Alton Bender MD (10/30/19 12:29:20)                 Impression:      1. No acute intracranial abnormalities noting sequela of chronic microvascular ischemic change and senescent change.  2. Prominent bifrontal extra-axial spaces, right greater than left, could reflect sequela of generalized cerebral volume loss versus chronic hygroma.  No high attenuating components to suggest acute hemorrhage.  3. Soft tissue edema/hematoma overlying the right frontal calvarium.      Electronically signed by: Alton Bender MD  Date:    10/30/2019  Time:    12:29             Narrative:    EXAMINATION:  CT HEAD WITHOUT CONTRAST    CLINICAL HISTORY:  Head trauma, minor, GCS>=13, NOC/NEXUS/CCR neg, first study;    TECHNIQUE:  Low dose axial images were obtained through the head.  Coronal and sagittal reformations were also performed. Contrast was not administered.    COMPARISON:  None.    FINDINGS:  There is generalized cerebral volume loss.  There is hypoattenuation in a periventricular fashion, likely sequela of chronic microvascular ischemic change.There are prominent bifrontal extra-axial spaces, low attenuating.  There is no evidence of acute major vascular territory infarct, hemorrhage, or mass.  There is no hydrocephalus.  There the paranasal sinuses and mastoid air cells are clear, and there is no evidence of calvarial fracture.  The visualized soft tissues are remarkable for soft tissue induration overlying the right frontal calvarium/supraorbital region without postseptal involvement..                               X-Ray Knee 3 View Left (Final result)  Result time 10/30/19  11:40:15    Final result by Jamar Henning MD (10/30/19 11:40:15)                 Impression:      Degenerative changes as discussed above.  No conventional radiographic evidence of recent fracture.      Electronically signed by: Jamar Henning MD  Date:    10/30/2019  Time:    11:40             Narrative:    EXAMINATION:  XR KNEE 3 VIEW LEFT    TECHNIQUE:  Three views of the left knee were obtained, with AP, lateral, and patellar projections submitted.    COMPARISON:  No relevant comparison examinations are currently available.  Clinical information of trauma.    FINDINGS:  Visualized osseous structures appear intact, with no definite evidence of recent fracture observed.  No lytic destructive process.  Some tibiofemoral joint space narrowing and extensive chondrocalcinosis is seen, as is spurring of the tibial spines and minimal patellar spurring.  No demonstrable joint effusion.                                 Medical Decision Making:   History:   Old Medical Records: I decided to obtain old medical records.  Old Records Summarized: records from clinic visits.  Clinical Tests:   Radiological Study: Ordered and Reviewed       APC / Resident Notes:   79-year-old male presenting with head injury and left knee pain status post fall from standing height.  On exam he is afebrile and nontoxic.  There is a large right forehead contusion and 2 superficial lacerations, is noted. Neck is supple with no midline C-spine tenderness and normal range of motion.  Left knee with swelling and diffuse tenderness to palpation.  There is no bony tenderness.  He has normal range of motion including active extension.  He is neuro intact. Ambulatory with normal gait, requires no assistance.    DDx includes but is not limited to contusion, ICH, fracture, knee sprain.    Head CT demonstrates no acute findings.  Left knee x-ray demonstrates degenerative changes.  No acute fracture dislocation.  Ades wrap applied to left knee.  Patient advised on  wound care and infection precautions.  He is stable for discharge. I discussed the care of this patient with my supervising MD.          Attending Attestation:     Physician Attestation Statement for NP/PA:   I discussed this assessment and plan of this patient with the NP/PA, but I did not personally examine the patient. The face to face encounter was performed by the NP/PA.                     Clinical Impression:       ICD-10-CM ICD-9-CM   1. Contusion of forehead, initial encounter S00.83XA 920   2. Fall W19.XXXA E888.9   3. Facial laceration, initial encounter S01.81XA 873.40         Disposition:   Disposition: Discharged  Condition: Stable                        Nalini Lawrence PA-C  10/30/19 1601       Nickie Sy MD  10/30/19 1633

## 2019-10-30 NOTE — ED NOTES
Patient identifiers verified and correct for Mr Ayoub  C/C: fall with linear laceration, abrasion to right forehead SEE NN  APPEARANCE: awake and alert in NAD.  SKIN: warm, dry slight redness with 3 linear laceration, abrasion, min bleeding, small laceration near eye brow 2 cm, longer lacerations 3 cm each   MUSCULOSKELETAL: Patient moving all extremities spontaneously, no obvious swelling or deformities noted. Ambulates independently.  RESPIRATORY: Denies shortness of breath.Respirations unlabored.   CARDIAC: Denies CP, 2+ distal pulses; no peripheral edema  ABDOMEN: S/ND/NT, Denies nausea  : voids spontaneously, denies difficulty offered urinal   Neurologic: AAO x 4; follows commands equal strength in all extremities; denies numbness/tingling. Denies dizziness Denies weakness, Denies LOC, Denies headache, pain to left knee

## 2019-10-30 NOTE — ED TRIAGE NOTES
Patient arrives via EMS after tripping, fell on his face and hit his forehead 1 hour PTA. Denies LOC. Arrives with dressing in place for 3 linear laceration, abrasions to forehead, no blood thinners, denies headache

## 2019-11-05 ENCOUNTER — OFFICE VISIT (OUTPATIENT)
Dept: INTERNAL MEDICINE | Facility: CLINIC | Age: 79
End: 2019-11-05
Payer: MEDICARE

## 2019-11-05 VITALS
TEMPERATURE: 99 F | DIASTOLIC BLOOD PRESSURE: 72 MMHG | HEART RATE: 84 BPM | RESPIRATION RATE: 18 BRPM | WEIGHT: 176.56 LBS | HEIGHT: 65 IN | BODY MASS INDEX: 29.42 KG/M2 | SYSTOLIC BLOOD PRESSURE: 134 MMHG

## 2019-11-05 DIAGNOSIS — S09.90XA TRAUMATIC INJURY OF HEAD, INITIAL ENCOUNTER: ICD-10-CM
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
## 2019-11-05 DIAGNOSIS — R41.3 MEMORY LOSS: ICD-10-CM

## 2019-11-05 DIAGNOSIS — S80.02XA CONTUSION OF LEFT KNEE, INITIAL ENCOUNTER: ICD-10-CM

## 2019-11-05 PROCEDURE — 99999 PR PBB SHADOW E&M-EST. PATIENT-LVL III: ICD-10-PCS | Mod: PBBFAC,HCNC,, | Performed by: INTERNAL MEDICINE

## 2019-11-05 PROCEDURE — 3075F SYST BP GE 130 - 139MM HG: CPT | Mod: HCNC,CPTII,S$GLB, | Performed by: INTERNAL MEDICINE

## 2019-11-05 PROCEDURE — 3288F FALL RISK ASSESSMENT DOCD: CPT | Mod: HCNC,CPTII,S$GLB, | Performed by: INTERNAL MEDICINE

## 2019-11-05 PROCEDURE — 3075F PR MOST RECENT SYSTOLIC BLOOD PRESS GE 130-139MM HG: ICD-10-PCS | Mod: HCNC,CPTII,S$GLB, | Performed by: INTERNAL MEDICINE

## 2019-11-05 PROCEDURE — 99999 PR PBB SHADOW E&M-EST. PATIENT-LVL III: CPT | Mod: PBBFAC,HCNC,, | Performed by: INTERNAL MEDICINE

## 2019-11-05 PROCEDURE — 1100F PTFALLS ASSESS-DOCD GE2>/YR: CPT | Mod: HCNC,CPTII,S$GLB, | Performed by: INTERNAL MEDICINE

## 2019-11-05 PROCEDURE — 3078F PR MOST RECENT DIASTOLIC BLOOD PRESSURE < 80 MM HG: ICD-10-PCS | Mod: HCNC,CPTII,S$GLB, | Performed by: INTERNAL MEDICINE

## 2019-11-05 PROCEDURE — 3288F PR FALLS RISK ASSESSMENT DOCUMENTED: ICD-10-PCS | Mod: HCNC,CPTII,S$GLB, | Performed by: INTERNAL MEDICINE

## 2019-11-05 PROCEDURE — 3078F DIAST BP <80 MM HG: CPT | Mod: HCNC,CPTII,S$GLB, | Performed by: INTERNAL MEDICINE

## 2019-11-05 PROCEDURE — 99214 PR OFFICE/OUTPT VISIT, EST, LEVL IV, 30-39 MIN: ICD-10-PCS | Mod: HCNC,S$GLB,, | Performed by: INTERNAL MEDICINE

## 2019-11-05 PROCEDURE — 99214 OFFICE O/P EST MOD 30 MIN: CPT | Mod: HCNC,S$GLB,, | Performed by: INTERNAL MEDICINE

## 2019-11-05 PROCEDURE — 1100F PR PT FALLS ASSESS DOC 2+ FALLS/FALL W/INJURY/YR: ICD-10-PCS | Mod: HCNC,CPTII,S$GLB, | Performed by: INTERNAL MEDICINE

## 2019-11-05 RX ORDER — VALSARTAN 160 MG/1
160 TABLET ORAL DAILY
Qty: 90 TABLET | Refills: 3 | Status: SHIPPED | OUTPATIENT
Start: 2019-11-05 | End: 2020-04-27

## 2019-11-05 NOTE — PROGRESS NOTES
Subjective:       Patient ID: Ajit Ayoub is a 79 y.o. male.    Chief Complaint: Follow-up (ED-fall); Knee Pain (left-pain at 5); Neck Pain; and Immunizations    HPI   Pt here for ED f/u on 10/30/19 for a mechanical fall. He slipped forward hitting his face/forehead and landing on his knees and hands at [employer] in [place]. No LOC. In the ED CT scan of the brain was neg for bleeding/fractures.   Review of Systems   Constitutional: Negative for activity change, appetite change, chills, diaphoresis, fatigue, fever and unexpected weight change.   HENT: Negative for postnasal drip, rhinorrhea, sinus pressure, sneezing, sore throat, trouble swallowing and voice change.    Respiratory: Negative for cough, shortness of breath and wheezing.    Cardiovascular: Negative for chest pain, palpitations and leg swelling.   Gastrointestinal: Negative for abdominal pain, blood in stool, constipation, diarrhea, nausea and vomiting.   Genitourinary: Negative for dysuria.   Musculoskeletal: Negative for arthralgias and myalgias.   Skin: Negative for rash and wound.   Allergic/Immunologic: Negative for environmental allergies and food allergies.   Hematological: Negative for adenopathy. Does not bruise/bleed easily.       Objective:      Physical Exam   Constitutional: He is oriented to person, place, and time. He appears well-developed and well-nourished. No distress.   HENT:   Head: Normocephalic.       Right Ear: External ear normal.   Left Ear: External ear normal.   Nose: Nose normal.   Mouth/Throat: Oropharynx is clear and moist. No oropharyngeal exudate.   Bruising    Eyes: Pupils are equal, round, and reactive to light. Conjunctivae and EOM are normal. Right eye exhibits no discharge. Left eye exhibits no discharge. No scleral icterus.   Neck: Normal range of motion. Neck supple. No JVD present.   Cardiovascular: Normal rate, regular rhythm and normal heart sounds.   No murmur heard.  Pulmonary/Chest: Effort normal  and breath sounds normal. No respiratory distress. He has no wheezes. He has no rales.   Musculoskeletal: He exhibits no edema.   Lymphadenopathy:     He has no cervical adenopathy.   Neurological: He is alert and oriented to person, place, and time.   Skin: Skin is warm and dry. No rash noted. He is not diaphoretic. No pallor.       Assessment:       1. Fall, initial encounter    2. Traumatic injury of head, initial encounter    3. Contusion of left knee, initial encounter    4. Memory loss        Plan:    1-3. Stable, no fractures or brain bleeding found   4. Referral to Neurology    5. HTN- stable on Valsartan 160 mg daily    6. Chronic prostatitis- pt has seen Urology    7. Obesity- pt advised on proper diet/exercise for weight loss   8. Hx of basal cell carcinoma on face- followed by Derm

## 2019-11-06 ENCOUNTER — HOSPITAL ENCOUNTER (EMERGENCY)
Facility: HOSPITAL | Age: 79
Discharge: HOME | End: 2019-11-06
Attending: EMERGENCY MEDICINE
Payer: MEDICARE

## 2019-11-06 VITALS
TEMPERATURE: 99 F | HEIGHT: 66 IN | DIASTOLIC BLOOD PRESSURE: 86 MMHG | OXYGEN SATURATION: 97 % | SYSTOLIC BLOOD PRESSURE: 175 MMHG | WEIGHT: 176 LBS | HEART RATE: 97 BPM | RESPIRATION RATE: 16 BRPM | BODY MASS INDEX: 28.28 KG/M2

## 2019-11-06 DIAGNOSIS — E04.1 THYROID NODULE: ICD-10-CM

## 2019-11-06 DIAGNOSIS — M54.2 NECK PAIN: ICD-10-CM

## 2019-11-06 DIAGNOSIS — S16.1XXA STRAIN OF NECK MUSCLE, INITIAL ENCOUNTER: Primary | ICD-10-CM

## 2019-11-06 PROCEDURE — 99284 EMERGENCY DEPT VISIT MOD MDM: CPT | Mod: HCNC,,, | Performed by: PHYSICIAN ASSISTANT

## 2019-11-06 PROCEDURE — 99284 EMERGENCY DEPT VISIT MOD MDM: CPT | Mod: 25,HCNC

## 2019-11-06 PROCEDURE — 99284 PR EMERGENCY DEPT VISIT,LEVEL IV: ICD-10-PCS | Mod: HCNC,,, | Performed by: PHYSICIAN ASSISTANT

## 2019-11-06 RX ORDER — LISINOPRIL 10 MG/1
TABLET ORAL
Qty: 90 TABLET | Refills: 0 | OUTPATIENT
Start: 2019-11-06

## 2019-11-06 RX ORDER — METHYLPREDNISOLONE 4 MG/1
TABLET ORAL
Qty: 1 PACKAGE | Refills: 0 | Status: SHIPPED | OUTPATIENT
Start: 2019-11-06 | End: 2019-11-15 | Stop reason: ALTCHOICE

## 2019-11-06 NOTE — ED PROVIDER NOTES
Encounter Date: 11/6/2019       History     Chief Complaint   Patient presents with    Neck Pain     recent fall (Oct 30)/ bruising to face- states when bends head down, hard to pick head up- states calcium deposits in cervical spine/ neck problems x months (symptoms worsened post fall)     This is a 79-year-old male with a past medical history of hypertension, hyperthyroidism, and prostatitis who presents to the ED with a chief complaint of neck pain. Patient reports a history of chronic neck pain, which worsened after a mechanical fall last week.  He states at the time his neck pain was unchanged but over the course of 1-2 days following the fall he developed worsening pain in his neck.  He states that this causes discomfort on the lateral aspect of his neck and he is unable to extend his neck.  His neighbor accompanies him today and reports him walking with his neck flexed forward.  He states that he had trouble lifting his head today after unloading the  due to pain in his neck.  He rates the pain 5/10.  He denies radiation of the pain, extremity weakness or numbness.  Additionally patient denies new trauma, fever, chills or additional complaints.        Review of patient's allergies indicates:   Allergen Reactions    Contrast media Rash    Pneumoc 13-olri conj-dip cr(pf) Rash     Past Medical History:   Diagnosis Date    Cataract     Chronic prostatitis     Glaucoma     Hypertension     Hyperthyroidism     pt states he has a fatty tumor    Obesity (BMI 30-39.9)     Vitamin D insufficiency      Past Surgical History:   Procedure Laterality Date    CIRCUMCISION N/A 12/5/2018    Procedure: CIRCUMCISION;  Surgeon: Renato Flores MD;  Location: [address];  Service: Urology;  Laterality: N/A;  1.5 hour    CYSTOSCOPY N/A 12/5/2018    Procedure: CYSTOSCOPY;  Surgeon: Renato Flores MD;  Location: [facility] OR [address] [place] [address];  Service: Urology;  Laterality: N/A;    TRANSURETHRAL RESECTION OF PROSTATE        Family History   Problem Relation Age of Onset    Diabetes Mother     Heart disease Mother     Diabetes Father     Heart disease Father     Stroke Paternal Grandfather     Cancer Neg Hx     Glaucoma Neg Hx     Cataracts Neg Hx     Blindness Neg Hx     Amblyopia Neg Hx     Hypertension Neg Hx     Macular degeneration Neg Hx     Retinal detachment Neg Hx     Strabismus Neg Hx     Thyroid disease Neg Hx      Social History     Tobacco Use    Smoking status: Never Smoker    Smokeless tobacco: Never Used   Substance Use Topics    Alcohol use: No    Drug use: No     Review of Systems   Constitutional: Negative for chills and fever.   HENT: Negative for sore throat.    Respiratory: Negative for shortness of breath.    Cardiovascular: Negative for chest pain.   Gastrointestinal: Negative for nausea and vomiting.   Genitourinary: Negative for dysuria.   Musculoskeletal: Positive for neck pain and neck stiffness. Negative for back pain.   Skin: Negative for rash.   Neurological: Negative for weakness and headaches.   Hematological: Does not bruise/bleed easily.       Physical Exam     Initial Vitals [11/06/19 0951]   BP Pulse Resp Temp SpO2   (!) 198/114 90 15 98 °F (36.7 °C) 97 %      MAP       --         Physical Exam    Constitutional: He appears well-developed and well-nourished. No distress.   HENT:   Head: Atraumatic.   Eyes: Conjunctivae and EOM are normal. Pupils are equal, round, and reactive to light.   Neck: Spinous process tenderness and muscular tenderness present. Decreased range of motion (decreased extension and left lateral rotation) present.   Cardiovascular: Normal rate, regular rhythm and normal heart sounds.   Pulmonary/Chest: Breath sounds normal. No respiratory distress. He has no wheezes. He has no rhonchi. He has no rales.   Abdominal: Soft. Bowel sounds are normal. There is no tenderness. There is no rebound and no guarding.   Musculoskeletal:        Cervical back: He exhibits  decreased range of motion, tenderness, pain and spasm.   Bilateral upper and lower extremities with full range of motion and 5/5 strength throughout.  Sensation to light touch equal throughout all extremities.   Neurological: He is alert and oriented to person, place, and time. He has normal strength and normal reflexes. Coordination and gait normal.   Skin: Skin is warm and dry. No rash noted.         ED Course   Procedures  Labs Reviewed - No data to display       Imaging Results          CT Cervical Spine Without Contrast (Final result)  Result time 11/06/19 11:43:26    Final result by Eron Oliver MD (11/06/19 11:43:26)                 Impression:      No evidence of acute fracture.    Large left partially calcified thyroid nodule.  Dedicated nonemergent thyroid ultrasound is recommended.      Electronically signed by: Eron Oliver MD  Date:    11/06/2019  Time:    11:43             Narrative:    EXAMINATION:  CT CERVICAL SPINE WITHOUT CONTRAST    CLINICAL HISTORY:  C-spine trauma, NEXUS/CCR negative, low risk;    TECHNIQUE:  Low dose axial images, sagittal and coronal reformations were performed though the cervical spine.  Contrast was not administered.    COMPARISON:  None    FINDINGS:  Visualized intracranial structures are normal in appearance.  Visualized paranasal sinuses and mastoid air cells are clear.  Thyroid demonstrates a left, partially calcified nodule measuring 5.1 x 4.2 cm.  Soft tissue structures of the neck are normal in appearance.  No evidence of lymphadenopathy.  Vascular structures demonstrate no significant calcific atherosclerosis.  Lung apices are clear.    Intervertebral disc space narrowing of all cervical levels.  Vertebral body heights and spinal alignment are satisfactorily maintained.  No evidence of acute fracture or dislocation.  Multilevel degenerative changes of the cervical spine with no high-grade stenosis.  Multilevel mild to moderate neural foraminal narrowing  noted.                                 Medical Decision Making:   History:   Old Medical Records: I decided to obtain old medical records.  Old Records Summarized: records from clinic visits and records from previous admission(s).  Clinical Tests:   Radiological Study: Ordered and Reviewed       APC / Resident Notes:   79-year-old male presenting with acute on chronic neck pain following recent fall.  On exam is afebrile and nontoxic.  He has decreased extension and lateral rotation of the cervical spine, is noted.  There is midline tenderness over the lower C-spine and bilateral paracervical muscle tenderness with associated spasm, worse on the right.  He has full range of motion of his extremities and is neurovascularly intact.    DDx includes but is not limited to cervical strain, fracture, spasm.  I considered but do not suspect central cord syndrome.    CT cervical spine is negative for acute fracture.  There is an incidental finding of a calcified thyroid nodule.  Patient made aware of findings and need for further nonemergent outpatient follow-up.  He will be discharged with a prescription for a Medrol Dosepak and advised to use over-the-counter analgesics as directed.  Should follow up with his PCP to discuss further management of his chronic neck pain.  He is stable for discharge. I discussed the care of this patient with my supervising MD.              Attending Attestation:     Physician Attestation Statement for NP/PA:   I discussed this assessment and plan of this patient with the NP/PA, but I did not personally examine the patient. The face to face encounter was performed by the NP/PA.                                Clinical Impression:       ICD-10-CM ICD-9-CM   1. Strain of neck muscle, initial encounter S16.1XXA 847.0   2. Neck pain M54.2 723.1   3. Thyroid nodule E04.1 241.0                             Nalini Lawrence PA-C  11/06/19 1641       Nickie Sy MD  11/07/19 [id number]

## 2019-11-06 NOTE — DISCHARGE INSTRUCTIONS
Your CT scan today shows no signs of fracture or injury to your neck from your recent fall.  An incidental finding showed a nodule on your thyroid.  Please call to schedule an outpatient follow-up with your primary care provider to further evaluate this.    You should take over-the-counter medications for pain such as ibuprofen or Tylenol, as directed.

## 2019-11-06 NOTE — ED TRIAGE NOTES
Pt presents to ED with c/o neck pain. Here last week for a fall and lac to head. Neck pain has been on and off for a while before fall. Patient is having trouble lifting neck for a long period of time.

## 2019-11-11 ENCOUNTER — TELEPHONE (OUTPATIENT)
Dept: INTERNAL MEDICINE | Facility: CLINIC | Age: 79
End: 2019-11-11

## 2019-11-11 NOTE — TELEPHONE ENCOUNTER
----- Message from Katja Schneider sent at 11/11/2019  3:53 PM CST -----  Contact: [phone]  Patient called wanting to know if anyone called from the office regarding physical therapy.  Patient stated after his ER visit on 11-6-2019.    Patient was told to discuss possible physical therapy with his doctor.    Please advise, thank you.

## 2019-11-11 NOTE — TELEPHONE ENCOUNTER
He went to er and they wanted him to see dr benavides this week for fol up.    We booked appt for Friday.  Date/time read back to him ,  I made sure he is aware this is to see dr giles for er fol up

## 2019-11-15 ENCOUNTER — OFFICE VISIT (OUTPATIENT)
Dept: INTERNAL MEDICINE | Facility: CLINIC | Age: 79
End: 2019-11-15
Payer: MEDICARE

## 2019-11-15 VITALS
BODY MASS INDEX: 27.92 KG/M2 | DIASTOLIC BLOOD PRESSURE: 81 MMHG | RESPIRATION RATE: 18 BRPM | HEART RATE: 84 BPM | HEIGHT: 66 IN | TEMPERATURE: 99 F | WEIGHT: 173.75 LBS | SYSTOLIC BLOOD PRESSURE: 119 MMHG

## 2019-11-15 DIAGNOSIS — M47.812 SPONDYLOSIS OF CERVICAL REGION WITHOUT MYELOPATHY OR RADICULOPATHY: Primary | ICD-10-CM

## 2019-11-15 DIAGNOSIS — E04.1 THYROID NODULE: ICD-10-CM

## 2019-11-15 DIAGNOSIS — S13.9XXA NECK SPRAIN, INITIAL ENCOUNTER: ICD-10-CM

## 2019-11-15 PROCEDURE — 99214 OFFICE O/P EST MOD 30 MIN: CPT | Mod: HCNC,S$GLB,, | Performed by: INTERNAL MEDICINE

## 2019-11-15 PROCEDURE — 99999 PR PBB SHADOW E&M-EST. PATIENT-LVL III: CPT | Mod: PBBFAC,HCNC,, | Performed by: INTERNAL MEDICINE

## 2019-11-15 PROCEDURE — 99499 RISK ADDL DX/OHS AUDIT: ICD-10-PCS | Mod: HCNC,S$GLB,, | Performed by: INTERNAL MEDICINE

## 2019-11-15 PROCEDURE — 3074F PR MOST RECENT SYSTOLIC BLOOD PRESSURE < 130 MM HG: ICD-10-PCS | Mod: HCNC,CPTII,S$GLB, | Performed by: INTERNAL MEDICINE

## 2019-11-15 PROCEDURE — 99214 PR OFFICE/OUTPT VISIT, EST, LEVL IV, 30-39 MIN: ICD-10-PCS | Mod: HCNC,S$GLB,, | Performed by: INTERNAL MEDICINE

## 2019-11-15 PROCEDURE — 3079F DIAST BP 80-89 MM HG: CPT | Mod: HCNC,CPTII,S$GLB, | Performed by: INTERNAL MEDICINE

## 2019-11-15 PROCEDURE — 3079F PR MOST RECENT DIASTOLIC BLOOD PRESSURE 80-89 MM HG: ICD-10-PCS | Mod: HCNC,CPTII,S$GLB, | Performed by: INTERNAL MEDICINE

## 2019-11-15 PROCEDURE — 3288F PR FALLS RISK ASSESSMENT DOCUMENTED: ICD-10-PCS | Mod: HCNC,CPTII,S$GLB, | Performed by: INTERNAL MEDICINE

## 2019-11-15 PROCEDURE — 3074F SYST BP LT 130 MM HG: CPT | Mod: HCNC,CPTII,S$GLB, | Performed by: INTERNAL MEDICINE

## 2019-11-15 PROCEDURE — 3288F FALL RISK ASSESSMENT DOCD: CPT | Mod: HCNC,CPTII,S$GLB, | Performed by: INTERNAL MEDICINE

## 2019-11-15 PROCEDURE — 99499 UNLISTED E&M SERVICE: CPT | Mod: HCNC,S$GLB,, | Performed by: INTERNAL MEDICINE

## 2019-11-15 PROCEDURE — 1100F PR PT FALLS ASSESS DOC 2+ FALLS/FALL W/INJURY/YR: ICD-10-PCS | Mod: HCNC,CPTII,S$GLB, | Performed by: INTERNAL MEDICINE

## 2019-11-15 PROCEDURE — 1100F PTFALLS ASSESS-DOCD GE2>/YR: CPT | Mod: HCNC,CPTII,S$GLB, | Performed by: INTERNAL MEDICINE

## 2019-11-15 PROCEDURE — 99999 PR PBB SHADOW E&M-EST. PATIENT-LVL III: ICD-10-PCS | Mod: PBBFAC,HCNC,, | Performed by: INTERNAL MEDICINE

## 2019-11-15 RX ORDER — NABUMETONE 750 MG/1
TABLET, FILM COATED ORAL
Qty: 180 TABLET | Refills: 1 | OUTPATIENT
Start: 2019-11-15

## 2019-11-15 RX ORDER — NABUMETONE 750 MG/1
750 TABLET, FILM COATED ORAL 2 TIMES DAILY PRN
Qty: 60 TABLET | Refills: 1 | Status: SHIPPED | OUTPATIENT
Start: 2019-11-15 | End: 2020-01-09

## 2019-11-15 NOTE — PROGRESS NOTES
Subjective:       Patient ID: Ajit Ayoub is a 79 y.o. male.    Chief Complaint: Follow-up (ER-fall neck & back spasms) and Fall (10-30)    HPI   Pt with recent fall a/w head trauma on 10/30 is here for f/u. He was seen in the ED on 11/6 for neck pain. CT cervical spine did not show any acute findings but did show DJD. It did see a calcified thyroid nodule and radiology is recommending a thyroid US. He was given a steroid pack and by now his neck pain is 65 % better. He is still having mild pain of his posterior neck a/w spasms at times. No radiation of the pain.   Review of Systems   Constitutional: Negative for activity change, appetite change, chills, diaphoresis, fatigue, fever and unexpected weight change.   HENT: Negative for postnasal drip, rhinorrhea, sinus pressure, sneezing, sore throat, trouble swallowing and voice change.    Respiratory: Negative for cough, shortness of breath and wheezing.    Cardiovascular: Negative for chest pain, palpitations and leg swelling.   Gastrointestinal: Negative for abdominal pain, blood in stool, constipation, diarrhea, nausea and vomiting.   Genitourinary: Negative for dysuria.   Musculoskeletal: Positive for myalgias and neck pain. Negative for arthralgias.   Skin: Negative for rash and wound.   Allergic/Immunologic: Negative for environmental allergies and food allergies.   Hematological: Negative for adenopathy. Does not bruise/bleed easily.       Objective:      Physical Exam   Constitutional: He is oriented to person, place, and time. He appears well-developed and well-nourished. No distress.   HENT:   Head: Normocephalic and atraumatic.   Eyes: Pupils are equal, round, and reactive to light. Conjunctivae and EOM are normal. Right eye exhibits no discharge. Left eye exhibits no discharge. No scleral icterus.   Neck: Normal range of motion. Neck supple. No JVD present.   Cardiovascular: Normal rate, regular rhythm and normal heart sounds.   No murmur  heard.  Pulmonary/Chest: Effort normal and breath sounds normal. No respiratory distress. He has no wheezes. He has no rales.   Musculoskeletal: He exhibits no edema.   Lymphadenopathy:     He has no cervical adenopathy.   Neurological: He is alert and oriented to person, place, and time.   Skin: Skin is warm and dry. No rash noted. He is not diaphoretic. No pallor.       Assessment:       1. Spondylosis of cervical region without myelopathy or radiculopathy    2. Neck sprain, initial encounter    3. Thyroid nodule        Plan:    1/2. Pain is resolving s/p medrol pack          Rx Relafen 750 mg BID PRN    3. Thyroid US ordered

## 2019-11-20 ENCOUNTER — TELEPHONE (OUTPATIENT)
Dept: INTERNAL MEDICINE | Facility: CLINIC | Age: 79
End: 2019-11-20

## 2019-11-20 ENCOUNTER — NURSE TRIAGE (OUTPATIENT)
Dept: ADMINISTRATIVE | Facility: CLINIC | Age: 79
End: 2019-11-20

## 2019-11-20 NOTE — TELEPHONE ENCOUNTER
----- Message from Eliane Reyes sent at 11/20/2019 11:17 AM CST -----  Pt called the office and advised he was left on hold for the nurse for 30 mins.  As I spoke with him, he kind of changed his story.  I offered him an appointment with Dr. Sen and actually that is all he wanted.  But he would still like to talk to the nurse.  Please call pt  @ [phone]/

## 2019-11-20 NOTE — TELEPHONE ENCOUNTER
Reason for Disposition   No answer.  First attempt to contact caller.  Follow-up call scheduled within 15 minutes.   Second attempt to contact family AND no contact made. Phone number verified.    Protocols used: NO CONTACT OR DUPLICATE CONTACT CALL-A-[place]    No answer x 2 attempts to return call.  No contact, no triage.

## 2019-11-21 ENCOUNTER — OFFICE VISIT (OUTPATIENT)
Dept: INTERNAL MEDICINE | Facility: CLINIC | Age: 79
End: 2019-11-21
Payer: MEDICARE

## 2019-11-21 ENCOUNTER — TELEPHONE (OUTPATIENT)
Dept: INTERNAL MEDICINE | Facility: CLINIC | Age: 79
End: 2019-11-21

## 2019-11-21 VITALS
HEART RATE: 80 BPM | DIASTOLIC BLOOD PRESSURE: 88 MMHG | RESPIRATION RATE: 18 BRPM | SYSTOLIC BLOOD PRESSURE: 138 MMHG | BODY MASS INDEX: 28.95 KG/M2 | HEIGHT: 66 IN | TEMPERATURE: 98 F | WEIGHT: 180.13 LBS

## 2019-11-21 DIAGNOSIS — F41.9 ANXIETY: ICD-10-CM

## 2019-11-21 DIAGNOSIS — M47.812 SPONDYLOSIS OF CERVICAL REGION WITHOUT MYELOPATHY OR RADICULOPATHY: Primary | ICD-10-CM

## 2019-11-21 PROCEDURE — 3079F DIAST BP 80-89 MM HG: CPT | Mod: HCNC,CPTII,S$GLB, | Performed by: INTERNAL MEDICINE

## 2019-11-21 PROCEDURE — 3075F SYST BP GE 130 - 139MM HG: CPT | Mod: HCNC,CPTII,S$GLB, | Performed by: INTERNAL MEDICINE

## 2019-11-21 PROCEDURE — 3075F PR MOST RECENT SYSTOLIC BLOOD PRESS GE 130-139MM HG: ICD-10-PCS | Mod: HCNC,CPTII,S$GLB, | Performed by: INTERNAL MEDICINE

## 2019-11-21 PROCEDURE — 99214 OFFICE O/P EST MOD 30 MIN: CPT | Mod: HCNC,S$GLB,, | Performed by: INTERNAL MEDICINE

## 2019-11-21 PROCEDURE — 1100F PR PT FALLS ASSESS DOC 2+ FALLS/FALL W/INJURY/YR: ICD-10-PCS | Mod: HCNC,CPTII,S$GLB, | Performed by: INTERNAL MEDICINE

## 2019-11-21 PROCEDURE — 1100F PTFALLS ASSESS-DOCD GE2>/YR: CPT | Mod: HCNC,CPTII,S$GLB, | Performed by: INTERNAL MEDICINE

## 2019-11-21 PROCEDURE — 3288F PR FALLS RISK ASSESSMENT DOCUMENTED: ICD-10-PCS | Mod: HCNC,CPTII,S$GLB, | Performed by: INTERNAL MEDICINE

## 2019-11-21 PROCEDURE — 99214 PR OFFICE/OUTPT VISIT, EST, LEVL IV, 30-39 MIN: ICD-10-PCS | Mod: HCNC,S$GLB,, | Performed by: INTERNAL MEDICINE

## 2019-11-21 PROCEDURE — 1125F AMNT PAIN NOTED PAIN PRSNT: CPT | Mod: HCNC,S$GLB,, | Performed by: INTERNAL MEDICINE

## 2019-11-21 PROCEDURE — 1159F PR MEDICATION LIST DOCUMENTED IN MEDICAL RECORD: ICD-10-PCS | Mod: HCNC,S$GLB,, | Performed by: INTERNAL MEDICINE

## 2019-11-21 PROCEDURE — 3288F FALL RISK ASSESSMENT DOCD: CPT | Mod: HCNC,CPTII,S$GLB, | Performed by: INTERNAL MEDICINE

## 2019-11-21 PROCEDURE — 99999 PR PBB SHADOW E&M-EST. PATIENT-LVL III: ICD-10-PCS | Mod: PBBFAC,HCNC,, | Performed by: INTERNAL MEDICINE

## 2019-11-21 PROCEDURE — 1159F MED LIST DOCD IN RCRD: CPT | Mod: HCNC,S$GLB,, | Performed by: INTERNAL MEDICINE

## 2019-11-21 PROCEDURE — 99999 PR PBB SHADOW E&M-EST. PATIENT-LVL III: CPT | Mod: PBBFAC,HCNC,, | Performed by: INTERNAL MEDICINE

## 2019-11-21 PROCEDURE — 3079F PR MOST RECENT DIASTOLIC BLOOD PRESSURE 80-89 MM HG: ICD-10-PCS | Mod: HCNC,CPTII,S$GLB, | Performed by: INTERNAL MEDICINE

## 2019-11-21 PROCEDURE — 1125F PR PAIN SEVERITY QUANTIFIED, PAIN PRESENT: ICD-10-PCS | Mod: HCNC,S$GLB,, | Performed by: INTERNAL MEDICINE

## 2019-11-21 RX ORDER — ESCITALOPRAM OXALATE 10 MG/1
10 TABLET ORAL NIGHTLY
Qty: 30 TABLET | Refills: 0 | Status: SHIPPED | OUTPATIENT
Start: 2019-11-21 | End: 2019-12-18 | Stop reason: SDUPTHER

## 2019-11-21 NOTE — PROGRESS NOTES
Subjective:       Patient ID: Ajit Ayoub is a 79 y.o. male.    Chief Complaint: Follow-up (pain from fall-neck & back pain, balance is off )    HPI   Pt with chronic anxiety, DJD cervical spine is here for f/u regarding intermittent neck pain since a recent fall. He was previously seen and had taken a steroid pack and then transitioned to relafen which is helping.   Review of Systems   Constitutional: Negative for activity change, appetite change, chills, diaphoresis, fatigue, fever and unexpected weight change.   HENT: Negative for postnasal drip, rhinorrhea, sinus pressure, sneezing, sore throat, trouble swallowing and voice change.    Respiratory: Negative for cough, shortness of breath and wheezing.    Cardiovascular: Negative for chest pain, palpitations and leg swelling.   Gastrointestinal: Negative for abdominal pain, blood in stool, constipation, diarrhea, nausea and vomiting.   Genitourinary: Negative for dysuria.   Musculoskeletal: Positive for arthralgias and neck pain. Negative for myalgias.   Skin: Negative for rash and wound.   Allergic/Immunologic: Negative for environmental allergies and food allergies.   Hematological: Negative for adenopathy. Does not bruise/bleed easily.   Psychiatric/Behavioral: Negative for self-injury and suicidal ideas. The patient is nervous/anxious.        Objective:      Physical Exam   Constitutional: He is oriented to person, place, and time. He appears well-developed and well-nourished. No distress.   HENT:   Head: Normocephalic and atraumatic.   Eyes: Pupils are equal, round, and reactive to light. Conjunctivae and EOM are normal. Right eye exhibits no discharge. Left eye exhibits no discharge. No scleral icterus.   Neck: Normal range of motion. Neck supple. No JVD present.   Cardiovascular: Normal rate, regular rhythm and normal heart sounds.   No murmur heard.  Pulmonary/Chest: Effort normal and breath sounds normal. No respiratory distress. He has no wheezes.  He has no rales.   Musculoskeletal: He exhibits no edema.        Cervical back: He exhibits tenderness, pain and spasm.   Lymphadenopathy:     He has no cervical adenopathy.   Neurological: He is alert and oriented to person, place, and time.   Skin: Skin is warm and dry. No rash noted. He is not diaphoretic. No pallor.       Assessment:       1. Spondylosis of cervical region without myelopathy or radiculopathy    2. Anxiety        Plan:    1. Pt advised to continue Relafen 750 mg BID PRN       Referral to PT   2. Rx Lexapro 10 mg qHS   3. F/u in 1 month

## 2019-11-21 NOTE — TELEPHONE ENCOUNTER
----- Message from Dunia Deras sent at 11/21/2019 12:24 PM CST -----  Contact: Patient   Patient is requesting a call about two medication that may interact.    Please call and advise.    Thank You

## 2019-11-21 NOTE — TELEPHONE ENCOUNTER
Lexapro , and antiinflammatory (Relafen) pills. it can cause abdominal bleeding. Was told to him as a precaution by pharmacist.  Advice needed?

## 2019-11-22 ENCOUNTER — OFFICE VISIT (OUTPATIENT)
Dept: OPTOMETRY | Facility: CLINIC | Age: 79
End: 2019-11-22
Payer: MEDICARE

## 2019-11-22 DIAGNOSIS — H01.02A SQUAMOUS BLEPHARITIS OF UPPER AND LOWER EYELIDS OF BOTH EYES: Primary | ICD-10-CM

## 2019-11-22 DIAGNOSIS — H40.1131 PRIMARY OPEN ANGLE GLAUCOMA (POAG) OF BOTH EYES, MILD STAGE: ICD-10-CM

## 2019-11-22 DIAGNOSIS — H25.10 NUCLEAR SCLEROSIS, UNSPECIFIED LATERALITY: ICD-10-CM

## 2019-11-22 DIAGNOSIS — H01.02B SQUAMOUS BLEPHARITIS OF UPPER AND LOWER EYELIDS OF BOTH EYES: Primary | ICD-10-CM

## 2019-11-22 PROCEDURE — 92012 INTRM OPH EXAM EST PATIENT: CPT | Mod: HCNC,S$GLB,, | Performed by: OPTOMETRIST

## 2019-11-22 PROCEDURE — 92012 PR EYE EXAM, EST PATIENT,INTERMED: ICD-10-PCS | Mod: HCNC,S$GLB,, | Performed by: OPTOMETRIST

## 2019-11-22 PROCEDURE — 99999 PR PBB SHADOW E&M-EST. PATIENT-LVL II: CPT | Mod: PBBFAC,HCNC,, | Performed by: OPTOMETRIST

## 2019-11-22 PROCEDURE — 99999 PR PBB SHADOW E&M-EST. PATIENT-LVL II: ICD-10-PCS | Mod: PBBFAC,HCNC,, | Performed by: OPTOMETRIST

## 2019-11-22 NOTE — PROGRESS NOTES
JOSÉ LUIS SIMPSON 10/2019  Here for IOP check, pachymetry and gonio today.  Patient has   been using lid scrubs once and AT's BID OU and states his eye feel 80%   better. Patient had new glasses made and sees well with them. patient   states his last HVF was in September with another doctor.  Glasses were   not made to what was ordered.  Prev iop up to 25 or 26, prev had laser ou for pressure    Last edited by Rock Ayala, OD on 11/22/2019  2:39 PM. (History)              Assessment /Plan     For exam results, see Encounter Report.    Squamous blepharitis of upper and lower eyelids of both eyes    Primary open angle glaucoma (POAG) of both eyes, mild stage    Nuclear sclerosis, unspecified laterality      1. Symptoms improved, cont with lid wipes and art tears. RTC 2 mos follow up.  2. IOP still fine for nerve, CD ratio small, no drops at this time, pachy thick,  waiting on another laser for right eye from [facility], OCT normal, Prev allergies to eyedrops, RTC 2 month IOP ck. Last VF in Sept with [facility].   3. Educated pt on presence of cataracts and effects on vision. No surgery at this time. Recheck in one year.           Addend 12/9/19 Received notes from MD office ALT OS 2002, SLT OS 2008,2015,2018, SLT OD 2011, 2018, last cd .55/5. IOP was 20 and 23  Will put scanned notes in chart

## 2019-11-25 ENCOUNTER — HOSPITAL ENCOUNTER (OUTPATIENT)
Dept: RADIOLOGY | Facility: HOSPITAL | Age: 79
Discharge: HOME | End: 2019-11-25
Attending: INTERNAL MEDICINE
Payer: MEDICARE

## 2019-11-25 DIAGNOSIS — E04.1 THYROID NODULE: ICD-10-CM

## 2019-11-25 PROCEDURE — 76536 US SOFT TISSUE HEAD NECK THYROID: ICD-10-PCS | Mod: 26,HCNC,, | Performed by: INTERNAL MEDICINE

## 2019-11-25 PROCEDURE — 76536 US EXAM OF HEAD AND NECK: CPT | Mod: TC,HCNC

## 2019-11-25 PROCEDURE — 76536 US EXAM OF HEAD AND NECK: CPT | Mod: 26,HCNC,, | Performed by: INTERNAL MEDICINE

## 2019-11-26 ENCOUNTER — TELEPHONE (OUTPATIENT)
Dept: INTERNAL MEDICINE | Facility: CLINIC | Age: 79
End: 2019-11-26

## 2019-11-26 DIAGNOSIS — E04.1 THYROID NODULE: Primary | ICD-10-CM

## 2019-11-26 NOTE — TELEPHONE ENCOUNTER
Patient was notifed US results; Eliane PATEL spoke t patient , He will call back tomorrow to schedule with ENT

## 2019-12-05 ENCOUNTER — TELEPHONE (OUTPATIENT)
Dept: INTERNAL MEDICINE | Facility: CLINIC | Age: 79
End: 2019-12-05

## 2019-12-05 NOTE — TELEPHONE ENCOUNTER
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    His throat bothering him since US

## 2019-12-05 NOTE — TELEPHONE ENCOUNTER
----- Message from Katherine Collazo sent at 12/5/2019  7:57 AM CST -----  Contact: Alicja [phone] or  [phone] leave message if no answer and call mobile  Patient has questions. He wanted to know if a fatty tumor showed in his last test and  To talk about his thyroid .

## 2019-12-09 ENCOUNTER — OFFICE VISIT (OUTPATIENT)
Dept: OTOLARYNGOLOGY | Facility: CLINIC | Age: 79
End: 2019-12-09
Payer: MEDICARE

## 2019-12-09 VITALS
DIASTOLIC BLOOD PRESSURE: 53 MMHG | BODY MASS INDEX: 27.61 KG/M2 | TEMPERATURE: 98 F | HEART RATE: 97 BPM | SYSTOLIC BLOOD PRESSURE: 158 MMHG | WEIGHT: 171.06 LBS

## 2019-12-09 DIAGNOSIS — E04.1 THYROID NODULE: Primary | ICD-10-CM

## 2019-12-09 PROCEDURE — 1126F AMNT PAIN NOTED NONE PRSNT: CPT | Mod: HCNC,S$GLB,, | Performed by: OTOLARYNGOLOGY

## 2019-12-09 PROCEDURE — 3078F PR MOST RECENT DIASTOLIC BLOOD PRESSURE < 80 MM HG: ICD-10-PCS | Mod: HCNC,CPTII,S$GLB, | Performed by: OTOLARYNGOLOGY

## 2019-12-09 PROCEDURE — 88173 PR  INTERPRETATION OF FNA SMEAR: ICD-10-PCS | Mod: 26,HCNC,, | Performed by: PATHOLOGY

## 2019-12-09 PROCEDURE — 1159F PR MEDICATION LIST DOCUMENTED IN MEDICAL RECORD: ICD-10-PCS | Mod: HCNC,S$GLB,, | Performed by: OTOLARYNGOLOGY

## 2019-12-09 PROCEDURE — 3288F FALL RISK ASSESSMENT DOCD: CPT | Mod: HCNC,CPTII,S$GLB, | Performed by: OTOLARYNGOLOGY

## 2019-12-09 PROCEDURE — 3077F SYST BP >= 140 MM HG: CPT | Mod: HCNC,CPTII,S$GLB, | Performed by: OTOLARYNGOLOGY

## 2019-12-09 PROCEDURE — 99999 PR PBB SHADOW E&M-EST. PATIENT-LVL III: ICD-10-PCS | Mod: PBBFAC,HCNC,, | Performed by: OTOLARYNGOLOGY

## 2019-12-09 PROCEDURE — 99203 OFFICE O/P NEW LOW 30 MIN: CPT | Mod: HCNC,S$GLB,, | Performed by: OTOLARYNGOLOGY

## 2019-12-09 PROCEDURE — 10005 FNA BX W/US GDN 1ST LES: CPT | Mod: HCNC,S$GLB,, | Performed by: OTOLARYNGOLOGY

## 2019-12-09 PROCEDURE — 3288F PR FALLS RISK ASSESSMENT DOCUMENTED: ICD-10-PCS | Mod: HCNC,CPTII,S$GLB, | Performed by: OTOLARYNGOLOGY

## 2019-12-09 PROCEDURE — 3078F DIAST BP <80 MM HG: CPT | Mod: HCNC,CPTII,S$GLB, | Performed by: OTOLARYNGOLOGY

## 2019-12-09 PROCEDURE — 88173 CYTOPATH EVAL FNA REPORT: CPT | Mod: HCNC | Performed by: PATHOLOGY

## 2019-12-09 PROCEDURE — 1100F PTFALLS ASSESS-DOCD GE2>/YR: CPT | Mod: HCNC,CPTII,S$GLB, | Performed by: OTOLARYNGOLOGY

## 2019-12-09 PROCEDURE — 1159F MED LIST DOCD IN RCRD: CPT | Mod: HCNC,S$GLB,, | Performed by: OTOLARYNGOLOGY

## 2019-12-09 PROCEDURE — 3077F PR MOST RECENT SYSTOLIC BLOOD PRESSURE >= 140 MM HG: ICD-10-PCS | Mod: HCNC,CPTII,S$GLB, | Performed by: OTOLARYNGOLOGY

## 2019-12-09 PROCEDURE — 88173 CYTOPATH EVAL FNA REPORT: CPT | Mod: 26,HCNC,, | Performed by: PATHOLOGY

## 2019-12-09 PROCEDURE — 1126F PR PAIN SEVERITY QUANTIFIED, NO PAIN PRESENT: ICD-10-PCS | Mod: HCNC,S$GLB,, | Performed by: OTOLARYNGOLOGY

## 2019-12-09 PROCEDURE — 99999 PR PBB SHADOW E&M-EST. PATIENT-LVL III: CPT | Mod: PBBFAC,HCNC,, | Performed by: OTOLARYNGOLOGY

## 2019-12-09 PROCEDURE — 99203 PR OFFICE/OUTPT VISIT, NEW, LEVL III, 30-44 MIN: ICD-10-PCS | Mod: HCNC,S$GLB,, | Performed by: OTOLARYNGOLOGY

## 2019-12-09 PROCEDURE — 10005 PR FINE NEEDLE ASP BIOPSY, W/US GUIDANCE, 1ST LESION: ICD-10-PCS | Mod: HCNC,S$GLB,, | Performed by: OTOLARYNGOLOGY

## 2019-12-09 PROCEDURE — 1100F PR PT FALLS ASSESS DOC 2+ FALLS/FALL W/INJURY/YR: ICD-10-PCS | Mod: HCNC,CPTII,S$GLB, | Performed by: OTOLARYNGOLOGY

## 2019-12-09 NOTE — LETTER
December 9, 2019      Homer Sen, DO  [address] [facility] [place] [address]           [facility]  [address]  [facility] [address]  Phone: [phone]  Fax: [phone]          Patient: Ajit Ayoub   MR Number: [id number]   YOB: 1940   Date of Visit: 12/9/2019       Dear Dr. Homer Sen:    Thank you for referring Ajit Ayoub to me for evaluation. Attached you will find relevant portions of my assessment and plan of care.    If you have questions, please do not hesitate to call me. I look forward to following Ajit Ayoub along with you.    Sincerely,    Raleigh Ramirez MD    Enclosure  CC:  No Recipients    If you would like to receive this communication electronically, please contact [email or web address] or [phone] to request more information on [facility] access.    For providers and/or their staff who would like to refer a patient to [facility], please contact us through our one-stop-shop provider referral line, [facility], at [phone].    If you feel you have received this communication in error or would no longer like to receive these types of communications, please e-mail [email or web address]

## 2019-12-09 NOTE — PROGRESS NOTES
Chief Complaint   Patient presents with    consult/ thyroid nodule       HPI   79 y.o. male presents for evaluation of a left thyroid nodule.  He denies compressive symptoms such as shortness of breath or dysphagia.  He denies a family history of thyroid cancer or a personal history of radiation exposure.  Ultrasound of the neck revealed a large left-sided thyroid nodule meeting FNA criteria.    Review of Systems   Constitutional: Negative for fatigue and unexpected weight change.   HENT: Per HPI.  Eyes: Negative for visual disturbance.   Respiratory: Negative for shortness of breath, hemoptysis   Cardiovascular: Negative for chest pain and palpitations.   Musculoskeletal: Negative for decreased ROM, back pain.   Skin: Negative for rash, sunburn, itching.   Neurological: Negative for dizziness and seizures.   Hematological: Negative for adenopathy. Does not bruise/bleed easily.   Endocrine: Negative for rapid weight loss/weight gain, heat/cold intolerance.     Past Medical History   Patient Active Problem List   Diagnosis    Essential hypertension    Chronic prostatitis    Obesity (BMI 30-39.9)    Hematuria, microscopic    S/P TURP    CKD (chronic kidney disease) stage 1, GFR 90 ml/min or greater    Anxiety    Kidney stone    Phimosis    Balanitis    Scrotal rash    H/O circumcision           Past Surgical History   Past Surgical History:   Procedure Laterality Date    CIRCUMCISION N/A 12/5/2018    Procedure: CIRCUMCISION;  Surgeon: Renato Flores MD;  Location: [facility] OR [address] [place] [address];  Service: Urology;  Laterality: N/A;  1.5 hour    CYSTOSCOPY N/A 12/5/2018    Procedure: CYSTOSCOPY;  Surgeon: Renato Flores MD;  Location: [facility] OR [address] [place] [address];  Service: Urology;  Laterality: N/A;    TRANSURETHRAL RESECTION OF PROSTATE           Family History   Family History   Problem Relation Age of Onset    Diabetes Mother     Heart disease Mother     Diabetes Father     Heart disease Father     Stroke Paternal  Grandfather     Cancer Neg Hx     Glaucoma Neg Hx     Cataracts Neg Hx     Blindness Neg Hx     Amblyopia Neg Hx     Hypertension Neg Hx     Macular degeneration Neg Hx     Retinal detachment Neg Hx     Strabismus Neg Hx     Thyroid disease Neg Hx            Social History   .  Social History     Socioeconomic History    Marital status: Single     Spouse name: Not on file    Number of children: Not on file    Years of education: Not on file    Highest education level: Not on file   Occupational History    Occupation: Retired     Social Needs    Financial resource strain: Not on file    Food insecurity:     Worry: Not on file     Inability: Not on file    Transportation needs:     Medical: Not on file     Non-medical: Not on file   Tobacco Use    Smoking status: Never Smoker    Smokeless tobacco: Never Used   Substance and Sexual Activity    Alcohol use: No    Drug use: No    Sexual activity: Not Currently     Partners: Female   Lifestyle    Physical activity:     Days per week: Not on file     Minutes per session: Not on file    Stress: Not on file   Relationships    Social connections:     Talks on phone: Not on file     Gets together: Not on file     Attends Hindu service: Not on file     Active member of club or organization: Not on file     Attends meetings of clubs or organizations: Not on file     Relationship status: Not on file   Other Topics Concern    Not on file   Social History Narrative    Not on file         Allergies   Review of patient's allergies indicates:   Allergen Reactions    Contrast media Rash    Pneumoc 13-lori conj-dip cr(pf) Rash           Physical Exam     Vitals:    12/09/19 1311   BP: (!) 158/53   Pulse: 97   Temp: 97.8 °F (36.6 °C)         Body mass index is 27.61 kg/m².      General: AOx3, NAD   Respiratory:  Symmetric chest rise, normal effort  Nose: No gross nasal septal deviation. Inferior Turbinates WNL bilaterally. No septal  perforation. No masses/lesions.   Oral Cavity:  Oral Tongue mobile, no lesions noted. Hard Palate WNL. No buccal or FOM lesions.  Oropharynx:  No masses/lesions of the posterior pharyngeal wall. Tonsillar fossa without lesions. Soft palate without masses. Midline uvula.   Neck: No scars.  No cervical lymphadenopathy, thyromegaly or thyroid nodules.  Normal range of motion.    Face: House Brackmann I bilaterally.       Procedure in detail:  Ultrasound guided fine-needle aspiration of left thyroid nodule    Informed consent obtained.  The overlying skin was prepped with alcohol and injected with several cc of 1% lidocaine with epinephrine.  The lesion in question was visualized utilizing ultrasound.  Fine-needle aspiration was obtained utilizing a 25 gauge needle.  The specimen was processed and adequacy was confirmed by on-site cytopathology.  The patient tolerated the procedure well.    Neck ultrasound reviewed.    Assessment/Plan  Problem List Items Addressed This Visit        Endocrine    Thyroid nodule - Primary     Fine-needle aspiration obtained today.  I will contact him with results.         Relevant Orders    Cytology-FNA Non-Radiology Clinician Performed w/o on site

## 2019-12-10 ENCOUNTER — TELEPHONE (OUTPATIENT)
Dept: OTOLARYNGOLOGY | Facility: CLINIC | Age: 79
End: 2019-12-10

## 2019-12-10 LAB — FINAL PATHOLOGIC DIAGNOSIS: NORMAL

## 2019-12-12 ENCOUNTER — TELEPHONE (OUTPATIENT)
Dept: INTERNAL MEDICINE | Facility: CLINIC | Age: 79
End: 2019-12-12

## 2019-12-13 ENCOUNTER — OFFICE VISIT (OUTPATIENT)
Dept: URGENT CARE | Facility: CLINIC | Age: 79
End: 2019-12-13
Payer: MEDICARE

## 2019-12-13 VITALS
SYSTOLIC BLOOD PRESSURE: 149 MMHG | TEMPERATURE: 98 F | WEIGHT: 171 LBS | OXYGEN SATURATION: 95 % | DIASTOLIC BLOOD PRESSURE: 94 MMHG | HEIGHT: 66 IN | BODY MASS INDEX: 27.48 KG/M2 | RESPIRATION RATE: 20 BRPM | HEART RATE: 85 BPM

## 2019-12-13 DIAGNOSIS — J06.9 UPPER RESPIRATORY TRACT INFECTION, UNSPECIFIED TYPE: Primary | ICD-10-CM

## 2019-12-13 PROCEDURE — 99214 OFFICE O/P EST MOD 30 MIN: CPT | Mod: S$GLB,,, | Performed by: FAMILY MEDICINE

## 2019-12-13 PROCEDURE — 99214 PR OFFICE/OUTPT VISIT, EST, LEVL IV, 30-39 MIN: ICD-10-PCS | Mod: S$GLB,,, | Performed by: FAMILY MEDICINE

## 2019-12-13 RX ORDER — BENZONATATE 100 MG/1
100 CAPSULE ORAL EVERY 6 HOURS PRN
Qty: 30 CAPSULE | Refills: 1 | Status: SHIPPED | OUTPATIENT
Start: 2019-12-13 | End: 2020-03-20

## 2019-12-13 RX ORDER — FLUTICASONE PROPIONATE 50 MCG
1 SPRAY, SUSPENSION (ML) NASAL DAILY
Qty: 9.9 ML | Refills: 0 | Status: SHIPPED | OUTPATIENT
Start: 2019-12-13 | End: 2021-07-26

## 2019-12-13 NOTE — PROGRESS NOTES
"Subjective:       Patient ID: Ajit Ayoub is a 79 y.o. male.    Vitals:  height is 5' 6" (1.676 m) and weight is 77.6 kg (171 lb). His oral temperature is 97.5 °F (36.4 °C). His blood pressure is 149/94 (abnormal) and his pulse is 85. His respiration is 20 and oxygen saturation is 95%.     Chief Complaint: Sinus Problem    This is a 79 y.o. male   with Past Medical History:  No date: Cataract  No date: Chronic prostatitis  No date: Glaucoma  No date: Hypertension  No date: Hyperthyroidism      Comment:  pt states he has a fatty tumor  No date: Obesity (BMI 30-39.9)  No date: Vitamin D insufficiency   and Past Surgical History:  12/5/2018: CIRCUMCISION; N/A      Comment:  Procedure: CIRCUMCISION;  Surgeon: Renato Flores MD;                 Location: [address];  Service: Urology;                 Laterality: N/A;  1.5 hour  12/5/2018: CYSTOSCOPY; N/A      Comment:  Procedure: CYSTOSCOPY;  Surgeon: Renato Flores MD;                 Location: [address];  Service: Urology;                 Laterality: N/A;  No date: TRANSURETHRAL RESECTION OF PROSTATE  who presents today with a chief complaint of a sinus problem that began a week ago. He's complaining of sneezing and a cough. He hasn't taken any medication to help relieve his symptoms.     Sinus Problem   This is a new problem. The problem has been gradually worsening since onset. There has been no fever. His pain is at a severity of 3/10. The pain is mild. Associated symptoms include coughing, sneezing and a sore throat. Pertinent negatives include no chills, congestion, diaphoresis, ear pain, shortness of breath or sinus pressure. Past treatments include nothing.       Constitution: Negative for chills, sweating, fatigue and fever.   HENT: Positive for sore throat. Negative for ear pain, congestion, sinus pain, sinus pressure and voice change.    Neck: Negative for painful lymph nodes.   Eyes: Negative for eye redness.   Respiratory: Positive for " cough. Negative for chest tightness, sputum production, bloody sputum, COPD, shortness of breath, stridor, wheezing and asthma.    Gastrointestinal: Negative for nausea and vomiting.   Musculoskeletal: Negative for muscle ache.   Skin: Negative for rash.   Allergic/Immunologic: Positive for sneezing. Negative for seasonal allergies and asthma.   Hematologic/Lymphatic: Negative for swollen lymph nodes.       Objective:      Physical Exam   Constitutional: He appears well-developed and well-nourished.   HENT:   Head: Normocephalic and atraumatic.   Nose: Mucosal edema and rhinorrhea present. Right sinus exhibits no maxillary sinus tenderness and no frontal sinus tenderness. Left sinus exhibits no maxillary sinus tenderness and no frontal sinus tenderness.   Mouth/Throat: Posterior oropharyngeal erythema present.   Eyes: Pupils are equal, round, and reactive to light. EOM are normal.   Neck: Normal range of motion. Neck supple.   Cardiovascular: Normal rate, regular rhythm and normal heart sounds.   Pulmonary/Chest: Effort normal and breath sounds normal.   Nursing note and vitals reviewed.        Assessment:       1. Upper respiratory tract infection, unspecified type        Plan:         Upper respiratory tract infection, unspecified type  -     fluticasone propionate (FLONASE) 50 mcg/actuation nasal spray; 1 spray (50 mcg total) by Each Nostril route once daily.  Dispense: 9.9 mL; Refill: 0  -     benzonatate (TESSALON PERLES) 100 MG capsule; Take 1 capsule (100 mg total) by mouth every 6 (six) hours as needed for Cough.  Dispense: 30 capsule; Refill: 1

## 2019-12-13 NOTE — PATIENT INSTRUCTIONS
Viral Upper Respiratory Illness (Adult)  You have a viral upper respiratory illness (URI), which is another term for the common cold. This illness is contagious during the first few days. It is spread through the air by coughing and sneezing. It may also be spread by direct contact (touching the sick person and then touching your own eyes, nose, or mouth). Frequent handwashing will decrease risk of spread. Most viral illnesses go away within 7 to 10 days with rest and simple home remedies. Sometimes the illness may last for several weeks. Antibiotics will not kill a virus, and they are generally not prescribed for this condition.    Home care  · If symptoms are severe, rest at home for the first 2 to 3 days. When you resume activity, don't let yourself get too tired.  · Avoid being exposed to cigarette smoke (yours or others).  · You may use acetaminophen or ibuprofen to control pain and fever, unless another medicine was prescribed. (Note: If you have chronic liver or kidney disease, have ever had a stomach ulcer or gastrointestinal bleeding, or are taking blood-thinning medicines, talk with your healthcare provider before using these medicines.) Aspirin should never be given to anyone under 18 years of age who is ill with a viral infection or fever. It may cause severe liver or brain damage.  · Your appetite may be poor, so a light diet is fine. Avoid dehydration by drinking 6 to 8 glasses of fluids per day (water, soft drinks, juices, tea, or soup). Extra fluids will help loosen secretions in the nose and lungs.  · Over-the-counter cold medicines will not shorten the length of time youre sick, but they may be helpful for the following symptoms: cough, sore throat, and nasal and sinus congestion. (Note: Do not use decongestants if you have high blood pressure.)  Follow-up care  Follow up with your healthcare provider, or as advised.  When to seek medical advice  Call your healthcare provider right away if any  of these occur:  · Cough with lots of colored sputum (mucus)  · Severe headache; face, neck, or ear pain  · Difficulty swallowing due to throat pain  · Fever of 100.4°F (38°C)  Call 911, or get immediate medical care  Call emergency services right away if any of these occur:  · Chest pain, shortness of breath, wheezing, or difficulty breathing  · Coughing up blood  · Inability to swallow due to throat pain  Date Last Reviewed: 9/13/2015  © 2640-5838 [employer]. [address] [place] [address]. All rights reserved. This information is not intended as a substitute for professional medical care. Always follow your healthcare professional's instructions.

## 2019-12-18 RX ORDER — ESCITALOPRAM OXALATE 10 MG/1
TABLET ORAL
Qty: 30 TABLET | Refills: 0 | Status: SHIPPED | OUTPATIENT
Start: 2019-12-18 | End: 2020-01-27 | Stop reason: SDUPTHER

## 2020-01-09 RX ORDER — NABUMETONE 750 MG/1
TABLET, FILM COATED ORAL
Qty: 180 TABLET | Refills: 0 | Status: SHIPPED | OUTPATIENT
Start: 2020-01-09 | End: 2020-04-06

## 2020-01-09 RX ORDER — NABUMETONE 750 MG/1
TABLET, FILM COATED ORAL
Qty: 60 TABLET | Refills: 1 | Status: SHIPPED | OUTPATIENT
Start: 2020-01-09 | End: 2020-01-09

## 2020-01-15 RX ORDER — ESCITALOPRAM OXALATE 10 MG/1
TABLET ORAL
Qty: 30 TABLET | Refills: 0 | OUTPATIENT
Start: 2020-01-15

## 2020-01-22 ENCOUNTER — OFFICE VISIT (OUTPATIENT)
Dept: OPTOMETRY | Facility: CLINIC | Age: 80
End: 2020-01-22
Payer: MEDICARE

## 2020-01-22 DIAGNOSIS — H01.02B SQUAMOUS BLEPHARITIS OF UPPER AND LOWER EYELIDS OF BOTH EYES: ICD-10-CM

## 2020-01-22 DIAGNOSIS — H02.403 PTOSIS OF BOTH EYELIDS: ICD-10-CM

## 2020-01-22 DIAGNOSIS — L71.9 ROSACEA: ICD-10-CM

## 2020-01-22 DIAGNOSIS — H40.1131 PRIMARY OPEN ANGLE GLAUCOMA (POAG) OF BOTH EYES, MILD STAGE: Primary | ICD-10-CM

## 2020-01-22 DIAGNOSIS — H25.10 NUCLEAR SCLEROSIS, UNSPECIFIED LATERALITY: ICD-10-CM

## 2020-01-22 DIAGNOSIS — H01.02A SQUAMOUS BLEPHARITIS OF UPPER AND LOWER EYELIDS OF BOTH EYES: ICD-10-CM

## 2020-01-22 PROCEDURE — 92012 INTRM OPH EXAM EST PATIENT: CPT | Mod: HCNC,S$GLB,, | Performed by: OPTOMETRIST

## 2020-01-22 PROCEDURE — 92012 PR EYE EXAM, EST PATIENT,INTERMED: ICD-10-PCS | Mod: HCNC,S$GLB,, | Performed by: OPTOMETRIST

## 2020-01-22 PROCEDURE — 99999 PR PBB SHADOW E&M-EST. PATIENT-LVL II: CPT | Mod: PBBFAC,HCNC,, | Performed by: OPTOMETRIST

## 2020-01-22 PROCEDURE — 99999 PR PBB SHADOW E&M-EST. PATIENT-LVL II: ICD-10-PCS | Mod: PBBFAC,HCNC,, | Performed by: OPTOMETRIST

## 2020-01-22 NOTE — PROGRESS NOTES
JOSÉ LUIS SIMPSON 11/2019  Here for IOP check today  Patient had noticed he wakes up   with droopy UL for the past month or two.  Patient had this happen about 1   yr. Ago and then it seemed to go away.  Patient would like to set up a   consult with Dr. Yu.  Patient has also noticed some trouble with glare   when driving.  Using AT's BID  OU and lid wipes once a day.    Last edited by Manda Jaeger on 1/22/2020 10:54 AM. (History)            Assessment /Plan     For exam results, see Encounter Report.    Primary open angle glaucoma (POAG) of both eyes, mild stage    Squamous blepharitis of upper and lower eyelids of both eyes    Rosacea    Nuclear sclerosis, unspecified laterality    Ptosis of both eyelids      1. IOP stable and fine for nerve, no new drops or surgery at this time, prev IOP in 29-30 range pre laser treat, CD ratio small-moderate, no drops at this time, pachy thick,  [facility] OCT normal, Prev allergies to eyedrops, RTC 4 month IOP ck. Last VF in Sept 2019 with [facility]. Addend 12/9/19 Received notes from MD office ALT OS 2002, SLT OS 2008,2015,2018, SLT OD 2011, 2018, last cd .55/5. IOP was 20 and 23  2,3 Cont ocusoft lid wipes and art tears long term.  4. Educated pt on presence of cataracts and effects on vision. No surgery at this time. Recheck in one year.  5. RTC with Aimee for eval., pt bothered by ptosis.

## 2020-01-27 ENCOUNTER — OFFICE VISIT (OUTPATIENT)
Dept: INTERNAL MEDICINE | Facility: CLINIC | Age: 80
End: 2020-01-27
Payer: MEDICARE

## 2020-01-27 VITALS
SYSTOLIC BLOOD PRESSURE: 138 MMHG | HEART RATE: 86 BPM | TEMPERATURE: 98 F | BODY MASS INDEX: 28.31 KG/M2 | WEIGHT: 176.13 LBS | HEIGHT: 66 IN | DIASTOLIC BLOOD PRESSURE: 80 MMHG | RESPIRATION RATE: 18 BRPM

## 2020-01-27 DIAGNOSIS — H01.02B SQUAMOUS BLEPHARITIS OF UPPER AND LOWER EYELIDS OF BOTH EYES: ICD-10-CM

## 2020-01-27 DIAGNOSIS — H01.02A SQUAMOUS BLEPHARITIS OF UPPER AND LOWER EYELIDS OF BOTH EYES: ICD-10-CM

## 2020-01-27 DIAGNOSIS — F41.9 ANXIETY: Primary | ICD-10-CM

## 2020-01-27 PROCEDURE — 1100F PR PT FALLS ASSESS DOC 2+ FALLS/FALL W/INJURY/YR: ICD-10-PCS | Mod: HCNC,CPTII,S$GLB, | Performed by: INTERNAL MEDICINE

## 2020-01-27 PROCEDURE — 99213 OFFICE O/P EST LOW 20 MIN: CPT | Mod: HCNC,S$GLB,, | Performed by: INTERNAL MEDICINE

## 2020-01-27 PROCEDURE — 1159F MED LIST DOCD IN RCRD: CPT | Mod: HCNC,S$GLB,, | Performed by: INTERNAL MEDICINE

## 2020-01-27 PROCEDURE — 3075F PR MOST RECENT SYSTOLIC BLOOD PRESS GE 130-139MM HG: ICD-10-PCS | Mod: HCNC,CPTII,S$GLB, | Performed by: INTERNAL MEDICINE

## 2020-01-27 PROCEDURE — 1126F PR PAIN SEVERITY QUANTIFIED, NO PAIN PRESENT: ICD-10-PCS | Mod: HCNC,S$GLB,, | Performed by: INTERNAL MEDICINE

## 2020-01-27 PROCEDURE — 3075F SYST BP GE 130 - 139MM HG: CPT | Mod: HCNC,CPTII,S$GLB, | Performed by: INTERNAL MEDICINE

## 2020-01-27 PROCEDURE — 3288F PR FALLS RISK ASSESSMENT DOCUMENTED: ICD-10-PCS | Mod: HCNC,CPTII,S$GLB, | Performed by: INTERNAL MEDICINE

## 2020-01-27 PROCEDURE — 3079F PR MOST RECENT DIASTOLIC BLOOD PRESSURE 80-89 MM HG: ICD-10-PCS | Mod: HCNC,CPTII,S$GLB, | Performed by: INTERNAL MEDICINE

## 2020-01-27 PROCEDURE — 1159F PR MEDICATION LIST DOCUMENTED IN MEDICAL RECORD: ICD-10-PCS | Mod: HCNC,S$GLB,, | Performed by: INTERNAL MEDICINE

## 2020-01-27 PROCEDURE — 99999 PR PBB SHADOW E&M-EST. PATIENT-LVL III: CPT | Mod: PBBFAC,HCNC,, | Performed by: INTERNAL MEDICINE

## 2020-01-27 PROCEDURE — 99999 PR PBB SHADOW E&M-EST. PATIENT-LVL III: ICD-10-PCS | Mod: PBBFAC,HCNC,, | Performed by: INTERNAL MEDICINE

## 2020-01-27 PROCEDURE — 1126F AMNT PAIN NOTED NONE PRSNT: CPT | Mod: HCNC,S$GLB,, | Performed by: INTERNAL MEDICINE

## 2020-01-27 PROCEDURE — 3288F FALL RISK ASSESSMENT DOCD: CPT | Mod: HCNC,CPTII,S$GLB, | Performed by: INTERNAL MEDICINE

## 2020-01-27 PROCEDURE — 99213 PR OFFICE/OUTPT VISIT, EST, LEVL III, 20-29 MIN: ICD-10-PCS | Mod: HCNC,S$GLB,, | Performed by: INTERNAL MEDICINE

## 2020-01-27 PROCEDURE — 1100F PTFALLS ASSESS-DOCD GE2>/YR: CPT | Mod: HCNC,CPTII,S$GLB, | Performed by: INTERNAL MEDICINE

## 2020-01-27 PROCEDURE — 3079F DIAST BP 80-89 MM HG: CPT | Mod: HCNC,CPTII,S$GLB, | Performed by: INTERNAL MEDICINE

## 2020-01-27 RX ORDER — ESCITALOPRAM OXALATE 10 MG/1
TABLET ORAL
Qty: 90 TABLET | Refills: 3 | Status: SHIPPED | OUTPATIENT
Start: 2020-01-27 | End: 2021-09-21 | Stop reason: SDUPTHER

## 2020-01-27 NOTE — PROGRESS NOTES
Subjective:       Patient ID: Ajit Ayoub is a 79 y.o. male.    Chief Complaint: Follow-up (1 month); Eye Problem (left eye droops down at times ); and Medication Management    HPI   Pt is here for f/u regarding anxiety. He was started on Lexapro which has improved his symptoms without any SE's.   Review of Systems   Constitutional: Negative for activity change, appetite change, chills, diaphoresis, fatigue, fever and unexpected weight change.   HENT: Negative for postnasal drip, rhinorrhea, sinus pressure, sneezing, sore throat, trouble swallowing and voice change.    Respiratory: Negative for cough, shortness of breath and wheezing.    Cardiovascular: Negative for chest pain, palpitations and leg swelling.   Gastrointestinal: Negative for abdominal pain, blood in stool, constipation, diarrhea, nausea and vomiting.   Genitourinary: Negative for dysuria.   Musculoskeletal: Negative for arthralgias and myalgias.   Skin: Negative for rash and wound.   Allergic/Immunologic: Negative for environmental allergies and food allergies.   Hematological: Negative for adenopathy. Does not bruise/bleed easily.   Psychiatric/Behavioral: Negative for self-injury and suicidal ideas. The patient is nervous/anxious.        Objective:      Physical Exam   Constitutional: He is oriented to person, place, and time. He appears well-developed and well-nourished. No distress.   HENT:   Head: Normocephalic and atraumatic.   Eyes: Pupils are equal, round, and reactive to light. Conjunctivae and EOM are normal. Right eye exhibits no discharge. Left eye exhibits no discharge. No scleral icterus.   Neck: Normal range of motion. Neck supple. No JVD present.   Cardiovascular: Normal rate, regular rhythm and normal heart sounds.   No murmur heard.  Pulmonary/Chest: Effort normal and breath sounds normal. No respiratory distress. He has no wheezes. He has no rales.   Musculoskeletal: He exhibits no edema.   Lymphadenopathy:     He has no  cervical adenopathy.   Neurological: He is alert and oriented to person, place, and time.   Skin: Skin is warm and dry. No rash noted. He is not diaphoretic. No pallor.       Assessment:       1. Anxiety    2. Squamous blepharitis of upper and lower eyelids of both eyes        Plan:    1. Stable, refill Lexapro 10 mg daily    2. Referral to ENT

## 2020-02-03 ENCOUNTER — TELEPHONE (OUTPATIENT)
Dept: OPHTHALMOLOGY | Facility: CLINIC | Age: 80
End: 2020-02-03

## 2020-02-03 NOTE — TELEPHONE ENCOUNTER
----- Message from Tristian Patel sent at 1/22/2020 11:28 AM CST -----  Manda Patel  Caller: Unspecified (Today, 11:11 AM)         Can you call this patient to set up a ptosis eval?     Thanks,   Manda

## 2020-02-03 NOTE — TELEPHONE ENCOUNTER
Patient states that he seen Dr. Sen who advised him to see Dr. Carrasco in ENT for eyelid problems. Patient wants to speak with Dr. Sen for clarification.

## 2020-02-07 ENCOUNTER — TELEPHONE (OUTPATIENT)
Dept: DERMATOLOGY | Facility: CLINIC | Age: 80
End: 2020-02-07

## 2020-02-07 NOTE — TELEPHONE ENCOUNTER
----- Message from Vianney Dent MD sent at 2/7/2020  3:58 PM CST -----  Contact: pt   Dr Yu ophthalmology  ----- Message -----  From: Niyah Guillen MA  Sent: 2/7/2020   3:40 PM CST  To: MD Dr. Filipe Nichols do you know any Physician concerning drooping eyes  ----- Message -----  From: Xin Barnes MA  Sent: 2/6/2020   5:06 PM CST  To: Niyah Guillen MA        ----- Message -----  From: Barbara Orantes  Sent: 2/6/2020   4:14 PM CST  To: Filipe NICOLE Staff    Pt needs some advice on if Dr. Dent knows anyone that he can be recommended to for droopy eyelid that is affecting his vision. Pt states Dr. Carrasco is not covered under his insurance . Please give pt a call back at [phone].

## 2020-02-12 ENCOUNTER — TELEPHONE (OUTPATIENT)
Dept: OPTOMETRY | Facility: CLINIC | Age: 80
End: 2020-02-12

## 2020-02-17 ENCOUNTER — TELEPHONE (OUTPATIENT)
Dept: NEUROLOGY | Facility: CLINIC | Age: 80
End: 2020-02-17

## 2020-02-17 ENCOUNTER — TELEPHONE (OUTPATIENT)
Dept: OPHTHALMOLOGY | Facility: CLINIC | Age: 80
End: 2020-02-17

## 2020-02-17 NOTE — TELEPHONE ENCOUNTER
Pt was calling in regards to scheduling a Ptosis Eval with Dr Yu per Dr Costa. Advised pt the next available appointment is not until June. They declined appointment at this time. Dr Rubio/ Bryson's information was given. He will call back if he needs further assistance from us.

## 2020-02-17 NOTE — TELEPHONE ENCOUNTER
----- Message from Natalie Johnson sent at 2/17/2020  2:57 PM CST -----  Contact: Patient   Patient called to to schedule an appt with Dr. Yu to treat droopy eye lid on both eyes that is hindering his driving. Pt has a referral on file from Dr. Ayala. Pt states the only days he will not be available are 3/13 and 3/20.    Pt call back#Verónica Roca [phone] or [phone]

## 2020-02-18 ENCOUNTER — TELEPHONE (OUTPATIENT)
Dept: NEUROLOGY | Facility: CLINIC | Age: 80
End: 2020-02-18

## 2020-02-18 NOTE — TELEPHONE ENCOUNTER
----- Message from Delano Hannah sent at 2/17/2020  4:22 PM CST -----  Contact: self  Pt state he received a message from staff to contact the office to reschedule his appointment due to Dr Contreras will not be in office Pt request a call back in the morning due to have to leave out and is having trouble w/ his mobile Pt states 2/28/2020; 3/13/2020 and 3/20/2020 he cannot make however can make any other date     Contact info

## 2020-02-27 ENCOUNTER — OFFICE VISIT (OUTPATIENT)
Dept: URGENT CARE | Facility: CLINIC | Age: 80
End: 2020-02-27
Payer: MEDICARE

## 2020-02-27 VITALS
RESPIRATION RATE: 20 BRPM | OXYGEN SATURATION: 95 % | SYSTOLIC BLOOD PRESSURE: 134 MMHG | BODY MASS INDEX: 28.28 KG/M2 | WEIGHT: 176 LBS | DIASTOLIC BLOOD PRESSURE: 90 MMHG | HEIGHT: 66 IN | TEMPERATURE: 99 F | HEART RATE: 83 BPM

## 2020-02-27 DIAGNOSIS — J06.9 UPPER RESPIRATORY TRACT INFECTION, UNSPECIFIED TYPE: Primary | ICD-10-CM

## 2020-02-27 PROCEDURE — 99214 PR OFFICE/OUTPT VISIT, EST, LEVL IV, 30-39 MIN: ICD-10-PCS | Mod: S$GLB,,, | Performed by: FAMILY MEDICINE

## 2020-02-27 PROCEDURE — 99214 OFFICE O/P EST MOD 30 MIN: CPT | Mod: S$GLB,,, | Performed by: FAMILY MEDICINE

## 2020-02-27 RX ORDER — BENZONATATE 100 MG/1
100 CAPSULE ORAL EVERY 6 HOURS PRN
Qty: 30 CAPSULE | Refills: 0 | Status: SHIPPED | OUTPATIENT
Start: 2020-02-27 | End: 2020-03-20

## 2020-02-27 NOTE — PROGRESS NOTES
"Subjective:       Patient ID: Ajit Ayoub is a 79 y.o. male.    Vitals:  height is 5' 6" (1.676 m) and weight is 79.8 kg (176 lb). His tympanic temperature is 99.1 °F (37.3 °C). His blood pressure is 134/90 (abnormal) and his pulse is 83. His respiration is 20 and oxygen saturation is 95%.     Chief Complaint: Sinus Problem    This is a 79 y.o. male   with Past Medical History:  No date: Cataract  No date: Chronic prostatitis  No date: Glaucoma  No date: Hypertension  No date: Hyperthyroidism      Comment:  pt states he has a fatty tumor  No date: Obesity (BMI 30-39.9)  No date: Vitamin D insufficiency   and Past Surgical History:  12/5/2018: CIRCUMCISION; N/A      Comment:  Procedure: CIRCUMCISION;  Surgeon: Renato Flores MD;                 Location: [address];  Service: Urology;                 Laterality: N/A;  1.5 hour  12/5/2018: CYSTOSCOPY; N/A      Comment:  Procedure: CYSTOSCOPY;  Surgeon: Renato Flores MD;                 Location: [address];  Service: Urology;                 Laterality: N/A;  No date: TRANSURETHRAL RESECTION OF PROSTATE  who presents today with a chief complaint of a sinus problem that began two weeks ago. He's complaining of congestion and sneezing. He's been taking zinc tablets and flonase to help relieve his symptoms.     Sinus Problem   This is a new problem. The current episode started 1 to 4 weeks ago. The problem has been gradually worsening since onset. There has been no fever. His pain is at a severity of 0/10. He is experiencing no pain. Associated symptoms include congestion and sneezing. Pertinent negatives include no chills, coughing, diaphoresis, ear pain, shortness of breath, sinus pressure or sore throat. Treatments tried: zinc and flonase. The treatment provided mild relief.       Constitution: Negative for chills, sweating, fatigue and fever.   HENT: Positive for congestion and postnasal drip. Negative for ear pain, sinus pain, sinus pressure, sore " throat and voice change.    Neck: Negative for painful lymph nodes.   Eyes: Negative for eye redness.   Respiratory: Negative for chest tightness, cough, sputum production, bloody sputum, COPD, shortness of breath, stridor, wheezing and asthma.    Gastrointestinal: Negative for nausea and vomiting.   Musculoskeletal: Negative for muscle ache.   Skin: Negative for rash.   Allergic/Immunologic: Positive for sneezing. Negative for seasonal allergies and asthma.   Hematologic/Lymphatic: Negative for swollen lymph nodes.       Objective:      Physical Exam   Constitutional: He appears well-developed and well-nourished.   HENT:   Head: Normocephalic and atraumatic.   Nose: Mucosal edema and rhinorrhea present. No purulent discharge. Right sinus exhibits no maxillary sinus tenderness and no frontal sinus tenderness. Left sinus exhibits no maxillary sinus tenderness and no frontal sinus tenderness.   Mouth/Throat: Posterior oropharyngeal erythema present. No oropharyngeal exudate.   Eyes: Pupils are equal, round, and reactive to light. EOM are normal.   Neck: Normal range of motion. Neck supple.   Cardiovascular: Normal rate, regular rhythm and normal heart sounds.   Pulmonary/Chest: Effort normal and breath sounds normal.   Nursing note and vitals reviewed.        Assessment:       1. Upper respiratory tract infection, unspecified type        Plan:         Upper respiratory tract infection, unspecified type  -     benzonatate (TESSALON PERLES) 100 MG capsule; Take 1 capsule (100 mg total) by mouth every 6 (six) hours as needed for Cough.  Dispense: 30 capsule; Refill: 0    Continue with OTC cold remedies.

## 2020-02-27 NOTE — PATIENT INSTRUCTIONS
Viral Upper Respiratory Illness (Adult)  You have a viral upper respiratory illness (URI), which is another term for the common cold. This illness is contagious during the first few days. It is spread through the air by coughing and sneezing. It may also be spread by direct contact (touching the sick person and then touching your own eyes, nose, or mouth). Frequent handwashing will decrease risk of spread. Most viral illnesses go away within 7 to 10 days with rest and simple home remedies. Sometimes the illness may last for several weeks. Antibiotics will not kill a virus, and they are generally not prescribed for this condition.    Home care  · If symptoms are severe, rest at home for the first 2 to 3 days. When you resume activity, don't let yourself get too tired.  · Avoid being exposed to cigarette smoke (yours or others).  · You may use acetaminophen or ibuprofen to control pain and fever, unless another medicine was prescribed. (Note: If you have chronic liver or kidney disease, have ever had a stomach ulcer or gastrointestinal bleeding, or are taking blood-thinning medicines, talk with your healthcare provider before using these medicines.) Aspirin should never be given to anyone under 18 years of age who is ill with a viral infection or fever. It may cause severe liver or brain damage.  · Your appetite may be poor, so a light diet is fine. Avoid dehydration by drinking 6 to 8 glasses of fluids per day (water, soft drinks, juices, tea, or soup). Extra fluids will help loosen secretions in the nose and lungs.  · Over-the-counter cold medicines will not shorten the length of time youre sick, but they may be helpful for the following symptoms: cough, sore throat, and nasal and sinus congestion. (Note: Do not use decongestants if you have high blood pressure.)  Follow-up care  Follow up with your healthcare provider, or as advised.  When to seek medical advice  Call your healthcare provider right away if any  of these occur:  · Cough with lots of colored sputum (mucus)  · Severe headache; face, neck, or ear pain  · Difficulty swallowing due to throat pain  · Fever of 100.4°F (38°C)  Call 911, or get immediate medical care  Call emergency services right away if any of these occur:  · Chest pain, shortness of breath, wheezing, or difficulty breathing  · Coughing up blood  · Inability to swallow due to throat pain  Date Last Reviewed: 9/13/2015  © 6903-7935 [employer]. [address] [place] [address]. All rights reserved. This information is not intended as a substitute for professional medical care. Always follow your healthcare professional's instructions.

## 2020-03-11 ENCOUNTER — TELEPHONE (OUTPATIENT)
Dept: INTERNAL MEDICINE | Facility: CLINIC | Age: 80
End: 2020-03-11

## 2020-03-11 DIAGNOSIS — I10 ESSENTIAL HYPERTENSION: ICD-10-CM

## 2020-03-11 DIAGNOSIS — E55.9 VITAMIN D DEFICIENCY: ICD-10-CM

## 2020-03-11 DIAGNOSIS — Z12.5 PROSTATE CANCER SCREENING: ICD-10-CM

## 2020-03-11 DIAGNOSIS — Z00.00 ANNUAL PHYSICAL EXAM: Primary | ICD-10-CM

## 2020-03-11 DIAGNOSIS — R73.09 ELEVATED GLUCOSE: ICD-10-CM

## 2020-03-11 NOTE — TELEPHONE ENCOUNTER
----- Message from Maricel Stallworth sent at 3/11/2020  2:38 PM CDT -----  Contact: Self [phone]  Patient has an upcoming lab appointment at the [facility] on 03/12  Please enter lab orders and link them to this appointment.    Thanks,  [facility] 5th floor reception desk

## 2020-03-13 ENCOUNTER — LAB VISIT (OUTPATIENT)
Dept: LAB | Facility: HOSPITAL | Age: 80
End: 2020-03-13
Attending: INTERNAL MEDICINE
Payer: MEDICARE

## 2020-03-13 DIAGNOSIS — Z00.00 ANNUAL PHYSICAL EXAM: ICD-10-CM

## 2020-03-13 DIAGNOSIS — R73.09 ELEVATED GLUCOSE: ICD-10-CM

## 2020-03-13 DIAGNOSIS — E55.9 VITAMIN D DEFICIENCY: ICD-10-CM

## 2020-03-13 DIAGNOSIS — I10 ESSENTIAL HYPERTENSION: ICD-10-CM

## 2020-03-13 DIAGNOSIS — Z12.5 PROSTATE CANCER SCREENING: ICD-10-CM

## 2020-03-13 LAB
25(OH)D3+25(OH)D2 SERPL-MCNC: 52 NG/ML (ref 30–96)
ALBUMIN SERPL BCP-MCNC: 3.6 G/DL (ref 3.5–5.2)
ALP SERPL-CCNC: 76 U/L (ref 55–135)
ALT SERPL W/O P-5'-P-CCNC: 17 U/L (ref 10–44)
ANION GAP SERPL CALC-SCNC: 7 MMOL/L (ref 8–16)
AST SERPL-CCNC: 25 U/L (ref 10–40)
BASOPHILS # BLD AUTO: 0.05 K/UL (ref 0–0.2)
BASOPHILS NFR BLD: 0.7 % (ref 0–1.9)
BILIRUB SERPL-MCNC: 0.3 MG/DL (ref 0.1–1)
BUN SERPL-MCNC: 26 MG/DL (ref 8–23)
CALCIUM SERPL-MCNC: 10.5 MG/DL (ref 8.7–10.5)
CHLORIDE SERPL-SCNC: 104 MMOL/L (ref 95–110)
CHOLEST SERPL-MCNC: 164 MG/DL (ref 120–199)
CHOLEST/HDLC SERPL: 4.1 {RATIO} (ref 2–5)
CO2 SERPL-SCNC: 33 MMOL/L (ref 23–29)
COMPLEXED PSA SERPL-MCNC: 3 NG/ML (ref 0–4)
CREAT SERPL-MCNC: 0.9 MG/DL (ref 0.5–1.4)
DIFFERENTIAL METHOD: ABNORMAL
EOSINOPHIL # BLD AUTO: 0.2 K/UL (ref 0–0.5)
EOSINOPHIL NFR BLD: 2.5 % (ref 0–8)
ERYTHROCYTE [DISTWIDTH] IN BLOOD BY AUTOMATED COUNT: 11.7 % (ref 11.5–14.5)
EST. GFR  (AFRICAN AMERICAN): >60 ML/MIN/1.73 M^2
EST. GFR  (NON AFRICAN AMERICAN): >60 ML/MIN/1.73 M^2
ESTIMATED AVG GLUCOSE: 126 MG/DL (ref 68–131)
GLUCOSE SERPL-MCNC: 129 MG/DL (ref 70–110)
HBA1C MFR BLD HPLC: 6 % (ref 4–5.6)
HCT VFR BLD AUTO: 45.7 % (ref 40–54)
HDLC SERPL-MCNC: 40 MG/DL (ref 40–75)
HDLC SERPL: 24.4 % (ref 20–50)
HGB BLD-MCNC: 14.7 G/DL (ref 14–18)
IMM GRANULOCYTES # BLD AUTO: 0.02 K/UL (ref 0–0.04)
IMM GRANULOCYTES NFR BLD AUTO: 0.3 % (ref 0–0.5)
LDLC SERPL CALC-MCNC: 111 MG/DL (ref 63–159)
LYMPHOCYTES # BLD AUTO: 1.7 K/UL (ref 1–4.8)
LYMPHOCYTES NFR BLD: 22.7 % (ref 18–48)
MCH RBC QN AUTO: 33.2 PG (ref 27–31)
MCHC RBC AUTO-ENTMCNC: 32.2 G/DL (ref 32–36)
MCV RBC AUTO: 103 FL (ref 82–98)
MONOCYTES # BLD AUTO: 0.6 K/UL (ref 0.3–1)
MONOCYTES NFR BLD: 7.5 % (ref 4–15)
NEUTROPHILS # BLD AUTO: 5.1 K/UL (ref 1.8–7.7)
NEUTROPHILS NFR BLD: 66.3 % (ref 38–73)
NONHDLC SERPL-MCNC: 124 MG/DL
NRBC BLD-RTO: 0 /100 WBC
PLATELET # BLD AUTO: 220 K/UL (ref 150–350)
PMV BLD AUTO: 10.3 FL (ref 9.2–12.9)
POTASSIUM SERPL-SCNC: 4.8 MMOL/L (ref 3.5–5.1)
PROT SERPL-MCNC: 7.1 G/DL (ref 6–8.4)
RBC # BLD AUTO: 4.43 M/UL (ref 4.6–6.2)
SODIUM SERPL-SCNC: 144 MMOL/L (ref 136–145)
TRIGL SERPL-MCNC: 65 MG/DL (ref 30–150)
TSH SERPL DL<=0.005 MIU/L-ACNC: 1.37 UIU/ML (ref 0.4–4)
WBC # BLD AUTO: 7.63 K/UL (ref 3.9–12.7)

## 2020-03-13 PROCEDURE — 36415 COLL VENOUS BLD VENIPUNCTURE: CPT | Mod: HCNC,PO

## 2020-03-13 PROCEDURE — 80061 LIPID PANEL: CPT | Mod: HCNC

## 2020-03-13 PROCEDURE — 84153 ASSAY OF PSA TOTAL: CPT | Mod: HCNC

## 2020-03-13 PROCEDURE — 80053 COMPREHEN METABOLIC PANEL: CPT | Mod: HCNC

## 2020-03-13 PROCEDURE — 82306 VITAMIN D 25 HYDROXY: CPT | Mod: HCNC

## 2020-03-13 PROCEDURE — 84443 ASSAY THYROID STIM HORMONE: CPT | Mod: HCNC

## 2020-03-13 PROCEDURE — 83036 HEMOGLOBIN GLYCOSYLATED A1C: CPT | Mod: HCNC

## 2020-03-13 PROCEDURE — 85025 COMPLETE CBC W/AUTO DIFF WBC: CPT | Mod: HCNC

## 2020-03-20 ENCOUNTER — OFFICE VISIT (OUTPATIENT)
Dept: INTERNAL MEDICINE | Facility: CLINIC | Age: 80
End: 2020-03-20
Payer: MEDICARE

## 2020-03-20 VITALS
WEIGHT: 171.31 LBS | HEIGHT: 66 IN | DIASTOLIC BLOOD PRESSURE: 80 MMHG | SYSTOLIC BLOOD PRESSURE: 132 MMHG | TEMPERATURE: 99 F | HEART RATE: 95 BPM | BODY MASS INDEX: 27.53 KG/M2 | RESPIRATION RATE: 18 BRPM

## 2020-03-20 DIAGNOSIS — E66.9 OBESITY (BMI 30-39.9): ICD-10-CM

## 2020-03-20 DIAGNOSIS — N41.1 CHRONIC PROSTATITIS: ICD-10-CM

## 2020-03-20 DIAGNOSIS — F41.9 ANXIETY: ICD-10-CM

## 2020-03-20 DIAGNOSIS — I10 ESSENTIAL HYPERTENSION: ICD-10-CM

## 2020-03-20 DIAGNOSIS — M47.812 OSTEOARTHRITIS OF CERVICAL SPINE, UNSPECIFIED SPINAL OSTEOARTHRITIS COMPLICATION STATUS: ICD-10-CM

## 2020-03-20 DIAGNOSIS — Z00.00 ANNUAL PHYSICAL EXAM: Primary | ICD-10-CM

## 2020-03-20 PROCEDURE — 99999 PR PBB SHADOW E&M-EST. PATIENT-LVL III: ICD-10-PCS | Mod: PBBFAC,HCNC,, | Performed by: INTERNAL MEDICINE

## 2020-03-20 PROCEDURE — 3075F PR MOST RECENT SYSTOLIC BLOOD PRESS GE 130-139MM HG: ICD-10-PCS | Mod: HCNC,CPTII,S$GLB, | Performed by: INTERNAL MEDICINE

## 2020-03-20 PROCEDURE — 99397 PER PM REEVAL EST PAT 65+ YR: CPT | Mod: HCNC,S$GLB,, | Performed by: INTERNAL MEDICINE

## 2020-03-20 PROCEDURE — 3079F DIAST BP 80-89 MM HG: CPT | Mod: HCNC,CPTII,S$GLB, | Performed by: INTERNAL MEDICINE

## 2020-03-20 PROCEDURE — 99999 PR PBB SHADOW E&M-EST. PATIENT-LVL III: CPT | Mod: PBBFAC,HCNC,, | Performed by: INTERNAL MEDICINE

## 2020-03-20 PROCEDURE — 3079F PR MOST RECENT DIASTOLIC BLOOD PRESSURE 80-89 MM HG: ICD-10-PCS | Mod: HCNC,CPTII,S$GLB, | Performed by: INTERNAL MEDICINE

## 2020-03-20 PROCEDURE — 99397 PR PREVENTIVE VISIT,EST,65 & OVER: ICD-10-PCS | Mod: HCNC,S$GLB,, | Performed by: INTERNAL MEDICINE

## 2020-03-20 PROCEDURE — 3075F SYST BP GE 130 - 139MM HG: CPT | Mod: HCNC,CPTII,S$GLB, | Performed by: INTERNAL MEDICINE

## 2020-03-20 RX ORDER — CHOLECALCIFEROL (VITAMIN D3) 25 MCG
1000 TABLET ORAL DAILY
COMMUNITY

## 2020-03-20 NOTE — PROGRESS NOTES
Subjective:       Patient ID: Ajit Ayoub is a 79 y.o. male.    Chief Complaint: Annual Exam; Neck Pain; and Back Pain    HPI   79 y.o. Male here for annual exam.      Vaccines: Influenza (2019); Tetanus (2014); Pneumovax (2018); Shingrix (will get)  Eye exam: 2018  Colonoscopy: 2011     Exercise: no  Diet: regular     Past Medical History:  No date: Chronic prostatitis  No date: Hyperthyroidism  No date: Obesity (BMI 30-39.9)  No date: Vitamin D insufficiency  Past Surgical History:  No date: TRANSURETHRAL RESECTION OF PROSTATE  Social History    Marital status: Single              Spouse name:                       Years of education:                 Number of children:                Occupational History  Occupation          Employer            Comment               Retired warehouse *                          Social History Main Topics    Smoking status: Never Smoker                                                                 Smokeless tobacco: Never Used                        Alcohol use: No              Drug use: No              Sexual activity: Not Currently     Partners with: Female     Review of patient's allergies indicates:  No Known Allergies  Review of Systems   Constitutional: Negative for activity change, appetite change, chills, diaphoresis, fatigue, fever and unexpected weight change.   HENT: Negative for congestion, mouth sores, postnasal drip, rhinorrhea, sinus pressure, sneezing, sore throat, trouble swallowing and voice change.    Eyes: Negative for discharge, itching and visual disturbance.   Respiratory: Negative for cough, chest tightness, shortness of breath and wheezing.    Cardiovascular: Negative for chest pain, palpitations and leg swelling.   Gastrointestinal: Negative for abdominal pain, blood in stool, constipation, diarrhea, nausea and vomiting.   Endocrine: Negative for cold intolerance and heat intolerance.   Genitourinary: Negative for difficulty urinating, dysuria,  flank pain, hematuria and urgency.   Musculoskeletal: Positive for arthralgias and neck pain. Negative for back pain and myalgias.   Skin: Negative for rash and wound.   Allergic/Immunologic: Negative for environmental allergies and food allergies.   Neurological: Negative for dizziness, tremors, seizures, syncope, weakness and headaches.   Hematological: Negative for adenopathy. Does not bruise/bleed easily.   Psychiatric/Behavioral: Negative for confusion, sleep disturbance and suicidal ideas. The patient is not nervous/anxious.        Objective:      Physical Exam   Constitutional: He is oriented to person, place, and time. He appears well-developed and well-nourished. No distress.   HENT:   Head: Normocephalic and atraumatic.   Right Ear: External ear normal.   Left Ear: External ear normal.   Nose: Nose normal.   Mouth/Throat: Oropharynx is clear and moist. No oropharyngeal exudate.   Eyes: Pupils are equal, round, and reactive to light. Conjunctivae and EOM are normal. Right eye exhibits no discharge. Left eye exhibits no discharge. No scleral icterus.   Neck: Normal range of motion. Neck supple. No JVD present. No thyromegaly present.   Cardiovascular: Normal rate, regular rhythm, normal heart sounds and intact distal pulses.   No murmur heard.  Pulmonary/Chest: Effort normal and breath sounds normal. No respiratory distress. He has no wheezes. He has no rales.   Abdominal: Soft. Bowel sounds are normal. He exhibits no distension. There is no tenderness. There is no guarding.   Musculoskeletal: He exhibits no edema.   Lymphadenopathy:     He has no cervical adenopathy.   Neurological: He is alert and oriented to person, place, and time. No cranial nerve deficit. Coordination normal.   Skin: Skin is warm and dry. No rash noted. He is not diaphoretic. No pallor.   Psychiatric: He has a normal mood and affect. Judgment normal.       Assessment:       1. Annual physical exam    2. Essential hypertension    3.  Chronic prostatitis    4. Obesity (BMI 30-39.9)    5. Anxiety    6. Osteoarthritis of cervical spine, unspecified spinal osteoarthritis complication status        Plan:    1. Blood work reviewed with pt       Vaccines: Influenza (2019); Tetanus (2014); Pneumovax (2018); Shingrix (will get)       Eye exam: 2018       Colonoscopy: 2011   2. HTN- stable on Diovan 160 mg daily   3. Chronic prostatitis- pt has seen Urology    4. Obesity- pt advised on proper diet/exercise for weight loss   5. Anxiety- stable on Lexapro 10 mg daily    6. Chronic neck pain- some relief with PT   7. Hx of basal cell carcinoma on face- followed by Derm   8. F/u in 1 yr

## 2020-03-23 RX ORDER — ALPRAZOLAM 0.25 MG/1
TABLET ORAL
Qty: 60 TABLET | Refills: 1 | Status: SHIPPED | OUTPATIENT
Start: 2020-03-23 | End: 2021-02-26 | Stop reason: SDUPTHER

## 2020-04-06 RX ORDER — NABUMETONE 750 MG/1
TABLET, FILM COATED ORAL
Qty: 180 TABLET | Refills: 0 | Status: SHIPPED | OUTPATIENT
Start: 2020-04-06 | End: 2021-07-26

## 2020-04-27 ENCOUNTER — TELEPHONE (OUTPATIENT)
Dept: INTERNAL MEDICINE | Facility: CLINIC | Age: 80
End: 2020-04-27

## 2020-04-27 RX ORDER — TELMISARTAN 40 MG/1
40 TABLET ORAL DAILY
Qty: 90 TABLET | Refills: 3 | Status: SHIPPED | OUTPATIENT
Start: 2020-04-27 | End: 2021-05-07 | Stop reason: SDUPTHER

## 2020-04-27 NOTE — TELEPHONE ENCOUNTER
----- Message from Brook Boggs sent at 4/27/2020  9:15 AM CDT -----  Contact: savanah[facility]   Savanah states they dont have the valsartan (DIOVAN) 160 MG tablet as of right now. Savanah states cans this be changed to something else. Please call and advise.

## 2020-05-22 ENCOUNTER — OFFICE VISIT (OUTPATIENT)
Dept: OPTOMETRY | Facility: CLINIC | Age: 80
End: 2020-05-22
Payer: MEDICARE

## 2020-05-22 DIAGNOSIS — H01.02A SQUAMOUS BLEPHARITIS OF UPPER AND LOWER EYELIDS OF BOTH EYES: Primary | ICD-10-CM

## 2020-05-22 DIAGNOSIS — L71.9 ROSACEA: ICD-10-CM

## 2020-05-22 DIAGNOSIS — H40.1131 PRIMARY OPEN ANGLE GLAUCOMA (POAG) OF BOTH EYES, MILD STAGE: ICD-10-CM

## 2020-05-22 DIAGNOSIS — H25.10 NUCLEAR SCLEROSIS, UNSPECIFIED LATERALITY: ICD-10-CM

## 2020-05-22 DIAGNOSIS — H01.02B SQUAMOUS BLEPHARITIS OF UPPER AND LOWER EYELIDS OF BOTH EYES: Primary | ICD-10-CM

## 2020-05-22 DIAGNOSIS — H02.403 PTOSIS OF BOTH EYELIDS: ICD-10-CM

## 2020-05-22 PROCEDURE — 92012 INTRM OPH EXAM EST PATIENT: CPT | Mod: HCNC,S$GLB,, | Performed by: OPTOMETRIST

## 2020-05-22 PROCEDURE — 99999 PR PBB SHADOW E&M-EST. PATIENT-LVL II: ICD-10-PCS | Mod: PBBFAC,HCNC,, | Performed by: OPTOMETRIST

## 2020-05-22 PROCEDURE — 99999 PR PBB SHADOW E&M-EST. PATIENT-LVL II: CPT | Mod: PBBFAC,HCNC,, | Performed by: OPTOMETRIST

## 2020-05-22 PROCEDURE — 92012 PR EYE EXAM, EST PATIENT,INTERMED: ICD-10-PCS | Mod: HCNC,S$GLB,, | Performed by: OPTOMETRIST

## 2020-05-22 RX ORDER — TOBRAMYCIN 3 MG/ML
1 SOLUTION/ DROPS OPHTHALMIC 4 TIMES DAILY
Qty: 5 ML | Refills: 0 | Status: SHIPPED | OUTPATIENT
Start: 2020-05-22 | End: 2020-06-01

## 2020-05-22 RX ORDER — VALSARTAN 160 MG/1
TABLET ORAL
COMMUNITY
Start: 2020-04-27 | End: 2021-05-10 | Stop reason: SDUPTHER

## 2020-05-22 NOTE — PROGRESS NOTES
JOSÉ LUIS SIMPSON 01/20  Here for 4 month IOP check  Patient is having crusting on his   lids and is doing lid scrubs once a day and has been using PF 0.1%   continuously since January. Patient is not using any other drops.  Patient   had seen a plastic surgeon in February.  and was told his pressures were   21.  His lid surgery has been postponed due to COVID.    Last edited by Manda Jaeger on 5/22/2020  1:23 PM. (History)            Assessment /Plan     For exam results, see Encounter Report.    Squamous blepharitis of upper and lower eyelids of both eyes  -     tobramycin sulfate 0.3% (TOBREX) 0.3 % ophthalmic solution; Place 1 drop into both eyes 4 (four) times daily. for 10 days  Dispense: 5 mL; Refill: 0    Ptosis of both eyelids    Primary open angle glaucoma (POAG) of both eyes, mild stage    Nuclear sclerosis, unspecified laterality    Rosacea      1,4. Restart Ocusoft lid wipes 2x/day x 10 days. ALso start Tobrex drops 4x/day x 10 days to eyelids.  2. Has appt with aRmiro for ptosis.  3. IOP stable and fine for nerve,  no new drops or surgery at this time, prev IOP in 29-30 range pre laser treat, CD ratio small-moderate, no drops at this time, pachy thick,  Pt was waiting on another laser for right eye from [facility], OCT normal, Prev allergies to eyedrops, Last VF in Sept 2019 with [place]. Addend 12/9/19 Received notes from MD office ALT OS 2002, SLT OS 2008,2015,2018, SLT OD 2011, 2018, last cd .55/5. IOP was 20 and 23  RTC 4 mos DFE and iop ck with HVf(24-2)ceci std and OCT.                   Until we can determine if his area in his leg is a clot or not by US I recommend not skipping Xarelto dose. If nose bleed is easy to stop (does not last longer than 10 minutes) this are considered minor and need to continue Xarelto therapy. Avoid blowing nose forcefully or picking. If these continue to be frequent will need reevaluation in the office.

## 2020-06-03 ENCOUNTER — TELEPHONE (OUTPATIENT)
Dept: OPTOMETRY | Facility: CLINIC | Age: 80
End: 2020-06-03

## 2020-06-03 NOTE — TELEPHONE ENCOUNTER
----- Message from Alaina Hughes sent at 6/3/2020 10:17 AM CDT -----  Contact: Pt      ----- Message -----  From: Sera Marquez  Sent: 6/3/2020  10:01 AM CDT  To: Jamie CANELA Staff    Pt is calling because he reached out last week to the [facility] dept about the release of his records to be sent to Dr[facility] Korey [facility]. He says he has yet to hear back and was wondering is Dr. Ayala's staff would be able to assist him to obatin this information.    Pts# [phone]

## 2020-06-04 ENCOUNTER — TELEPHONE (OUTPATIENT)
Dept: INTERNAL MEDICINE | Facility: CLINIC | Age: 80
End: 2020-06-04

## 2020-06-04 NOTE — TELEPHONE ENCOUNTER
Spoke to pt's neighbor who often looks after pt. She stated that he had a fall this morning, but sustained no injuries. She stated that she is faxing over a summary involving pt and symptoms.     Per phone note she stated that pt's main concern is pain in neck, neck weakness, and left foot numbness.

## 2020-06-04 NOTE — TELEPHONE ENCOUNTER
----- Message from Lakisha Mcdonough sent at 6/4/2020  1:23 PM CDT -----  Contact: Verónica (neighbor) [phone]  Would like to get medical advice.  Symptoms (please be specific):  Pain in neck area, unable to  head well, toes on left numb (not all time), patient fell bathtub this morning, not walking well  How long has patient had these symptoms:  For a while  Pharmacy name and phone #:  [id number] - [place] - [address] AT [facility] [address] [phone] (Phone)  [phone] (Fax)  Any drug allergies (copy from chart):      Would the patient rather a call back or a response via [facility]?:  Call back  Comments:

## 2020-06-05 ENCOUNTER — TELEPHONE (OUTPATIENT)
Dept: OPTOMETRY | Facility: CLINIC | Age: 80
End: 2020-06-05

## 2020-06-05 NOTE — TELEPHONE ENCOUNTER
----- Message from Alaina Hughes sent at 6/4/2020  1:02 PM CDT -----  Contact: Ajit      ----- Message -----  From: Philippe William  Sent: 6/4/2020  12:47 PM CDT  To: Jamie Vargas    Mr. Ayoub needs his chart note to be faxed to [facility]. The fax number [phone]. If you need to speak with Mr. Ayoub he can be reached at [phone].

## 2020-06-08 ENCOUNTER — TELEPHONE (OUTPATIENT)
Dept: INTERNAL MEDICINE | Facility: CLINIC | Age: 80
End: 2020-06-08

## 2020-06-08 NOTE — TELEPHONE ENCOUNTER
----- Message from David Rocha sent at 6/8/2020 10:23 AM CDT -----  Contact: FriendVerónica [phone]  Patient is returning a phone call.  Who left a message for the patient: Dr gudino   Does patient know what this is regarding:  Appt  Comments: Verónica Morel [phone]    Please call an advise  Thank you

## 2020-06-16 ENCOUNTER — TELEPHONE (OUTPATIENT)
Dept: INTERNAL MEDICINE | Facility: CLINIC | Age: 80
End: 2020-06-16

## 2020-06-16 NOTE — TELEPHONE ENCOUNTER
----- Message from Yolis Jade sent at 6/16/2020 11:13 AM CDT -----  Regarding: returning call  Patient Returning Call from [facility]    Who Left Message for Patient: Vicky (Cee-Cee)     Communication Preference: Verónica [phone]    Additional Information:  Verónica is requesting a call back in reference to the pt appt on 6/17. She has notes for the appt.

## 2020-06-17 ENCOUNTER — HOSPITAL ENCOUNTER (OUTPATIENT)
Dept: RADIOLOGY | Facility: HOSPITAL | Age: 80
Discharge: HOME | End: 2020-06-17
Attending: INTERNAL MEDICINE
Payer: MEDICARE

## 2020-06-17 ENCOUNTER — OFFICE VISIT (OUTPATIENT)
Dept: INTERNAL MEDICINE | Facility: CLINIC | Age: 80
End: 2020-06-17
Payer: MEDICARE

## 2020-06-17 VITALS
TEMPERATURE: 98 F | WEIGHT: 168.88 LBS | HEART RATE: 86 BPM | BODY MASS INDEX: 27.14 KG/M2 | RESPIRATION RATE: 16 BRPM | SYSTOLIC BLOOD PRESSURE: 128 MMHG | HEIGHT: 66 IN | DIASTOLIC BLOOD PRESSURE: 80 MMHG

## 2020-06-17 DIAGNOSIS — F41.9 ANXIETY: ICD-10-CM

## 2020-06-17 DIAGNOSIS — M54.9 CHRONIC NECK AND BACK PAIN: Primary | ICD-10-CM

## 2020-06-17 DIAGNOSIS — M54.2 CHRONIC NECK AND BACK PAIN: Primary | ICD-10-CM

## 2020-06-17 DIAGNOSIS — M54.9 CHRONIC NECK AND BACK PAIN: ICD-10-CM

## 2020-06-17 DIAGNOSIS — M54.2 CHRONIC NECK AND BACK PAIN: ICD-10-CM

## 2020-06-17 DIAGNOSIS — I10 ESSENTIAL HYPERTENSION: ICD-10-CM

## 2020-06-17 DIAGNOSIS — G89.29 CHRONIC NECK AND BACK PAIN: Primary | ICD-10-CM

## 2020-06-17 DIAGNOSIS — N41.1 CHRONIC PROSTATITIS: ICD-10-CM

## 2020-06-17 DIAGNOSIS — G89.29 CHRONIC NECK AND BACK PAIN: ICD-10-CM

## 2020-06-17 DIAGNOSIS — E66.9 OBESITY (BMI 30-39.9): ICD-10-CM

## 2020-06-17 PROCEDURE — 72070 X-RAY EXAM THORAC SPINE 2VWS: CPT | Mod: TC,HCNC,PO

## 2020-06-17 PROCEDURE — 99214 PR OFFICE/OUTPT VISIT, EST, LEVL IV, 30-39 MIN: ICD-10-PCS | Mod: HCNC,S$GLB,, | Performed by: INTERNAL MEDICINE

## 2020-06-17 PROCEDURE — 72110 X-RAY EXAM L-2 SPINE 4/>VWS: CPT | Mod: 26,HCNC,, | Performed by: RADIOLOGY

## 2020-06-17 PROCEDURE — 99999 PR PBB SHADOW E&M-EST. PATIENT-LVL V: ICD-10-PCS | Mod: PBBFAC,HCNC,, | Performed by: INTERNAL MEDICINE

## 2020-06-17 PROCEDURE — 99214 OFFICE O/P EST MOD 30 MIN: CPT | Mod: HCNC,S$GLB,, | Performed by: INTERNAL MEDICINE

## 2020-06-17 PROCEDURE — 3074F PR MOST RECENT SYSTOLIC BLOOD PRESSURE < 130 MM HG: ICD-10-PCS | Mod: HCNC,CPTII,S$GLB, | Performed by: INTERNAL MEDICINE

## 2020-06-17 PROCEDURE — 72070 XR THORACIC SPINE AP LATERAL: ICD-10-PCS | Mod: 26,HCNC,, | Performed by: RADIOLOGY

## 2020-06-17 PROCEDURE — 3074F SYST BP LT 130 MM HG: CPT | Mod: HCNC,CPTII,S$GLB, | Performed by: INTERNAL MEDICINE

## 2020-06-17 PROCEDURE — 1125F AMNT PAIN NOTED PAIN PRSNT: CPT | Mod: HCNC,S$GLB,, | Performed by: INTERNAL MEDICINE

## 2020-06-17 PROCEDURE — 72110 X-RAY EXAM L-2 SPINE 4/>VWS: CPT | Mod: TC,HCNC,PO

## 2020-06-17 PROCEDURE — 1159F PR MEDICATION LIST DOCUMENTED IN MEDICAL RECORD: ICD-10-PCS | Mod: HCNC,S$GLB,, | Performed by: INTERNAL MEDICINE

## 2020-06-17 PROCEDURE — 3288F PR FALLS RISK ASSESSMENT DOCUMENTED: ICD-10-PCS | Mod: HCNC,CPTII,S$GLB, | Performed by: INTERNAL MEDICINE

## 2020-06-17 PROCEDURE — 72070 X-RAY EXAM THORAC SPINE 2VWS: CPT | Mod: 26,HCNC,, | Performed by: RADIOLOGY

## 2020-06-17 PROCEDURE — 99499 RISK ADDL DX/OHS AUDIT: ICD-10-PCS | Mod: HCNC,S$GLB,, | Performed by: INTERNAL MEDICINE

## 2020-06-17 PROCEDURE — 1125F PR PAIN SEVERITY QUANTIFIED, PAIN PRESENT: ICD-10-PCS | Mod: HCNC,S$GLB,, | Performed by: INTERNAL MEDICINE

## 2020-06-17 PROCEDURE — 1100F PR PT FALLS ASSESS DOC 2+ FALLS/FALL W/INJURY/YR: ICD-10-PCS | Mod: HCNC,CPTII,S$GLB, | Performed by: INTERNAL MEDICINE

## 2020-06-17 PROCEDURE — 1100F PTFALLS ASSESS-DOCD GE2>/YR: CPT | Mod: HCNC,CPTII,S$GLB, | Performed by: INTERNAL MEDICINE

## 2020-06-17 PROCEDURE — 3079F PR MOST RECENT DIASTOLIC BLOOD PRESSURE 80-89 MM HG: ICD-10-PCS | Mod: HCNC,CPTII,S$GLB, | Performed by: INTERNAL MEDICINE

## 2020-06-17 PROCEDURE — 1159F MED LIST DOCD IN RCRD: CPT | Mod: HCNC,S$GLB,, | Performed by: INTERNAL MEDICINE

## 2020-06-17 PROCEDURE — 99499 UNLISTED E&M SERVICE: CPT | Mod: HCNC,S$GLB,, | Performed by: INTERNAL MEDICINE

## 2020-06-17 PROCEDURE — 3288F FALL RISK ASSESSMENT DOCD: CPT | Mod: HCNC,CPTII,S$GLB, | Performed by: INTERNAL MEDICINE

## 2020-06-17 PROCEDURE — 3079F DIAST BP 80-89 MM HG: CPT | Mod: HCNC,CPTII,S$GLB, | Performed by: INTERNAL MEDICINE

## 2020-06-17 PROCEDURE — 99999 PR PBB SHADOW E&M-EST. PATIENT-LVL V: CPT | Mod: PBBFAC,HCNC,, | Performed by: INTERNAL MEDICINE

## 2020-06-17 PROCEDURE — 72110 XR LUMBAR SPINE COMPLETE 5 VIEW: ICD-10-PCS | Mod: 26,HCNC,, | Performed by: RADIOLOGY

## 2020-06-17 NOTE — PROGRESS NOTES
Subjective:       Patient ID: Ajti Ayoub is a 80 y.o. male.    Chief Complaint: Neck Pain (confusion), Back Pain, and Anxiety    HPI   Pt with chronic neck/back pain is here for f/u. He is c/o persistent symptoms of posterior cervical pain which can radiate to his shoulders and down his spine to the lumbar area. A/s of numbness in his left toes. Some relief with NSAIDs.   Review of Systems   Constitutional: Negative for activity change, appetite change, chills, diaphoresis, fatigue, fever and unexpected weight change.   HENT: Negative for postnasal drip, rhinorrhea, sinus pressure/congestion, sneezing, sore throat, trouble swallowing and voice change.    Respiratory: Negative for cough, shortness of breath and wheezing.    Cardiovascular: Negative for chest pain, palpitations and leg swelling.   Gastrointestinal: Negative for abdominal pain, blood in stool, constipation, diarrhea, nausea and vomiting.   Genitourinary: Negative for dysuria.   Musculoskeletal: Positive for back pain and neck pain. Negative for arthralgias and myalgias.   Integumentary:  Negative for rash and wound.   Allergic/Immunologic: Negative for environmental allergies and food allergies.   Hematological: Negative for adenopathy. Does not bruise/bleed easily.         Objective:      Physical Exam  Constitutional:       General: He is not in acute distress.     Appearance: He is well-developed. He is not diaphoretic.   HENT:      Head: Normocephalic and atraumatic.      Right Ear: External ear normal.      Left Ear: External ear normal.      Nose: Nose normal.      Mouth/Throat:      Pharynx: No oropharyngeal exudate.   Eyes:      General: No scleral icterus.        Right eye: No discharge.         Left eye: No discharge.      Conjunctiva/sclera: Conjunctivae normal.      Pupils: Pupils are equal, round, and reactive to light.   Neck:      Musculoskeletal: Normal range of motion and neck supple.      Vascular: No JVD.   Cardiovascular:       Rate and Rhythm: Normal rate and regular rhythm.      Heart sounds: Normal heart sounds. No murmur.   Pulmonary:      Effort: Pulmonary effort is normal. No respiratory distress.      Breath sounds: Normal breath sounds. No wheezing or rales.   Musculoskeletal:      Cervical back: He exhibits tenderness and pain.   Lymphadenopathy:      Cervical: No cervical adenopathy.   Skin:     General: Skin is warm and dry.      Coloration: Skin is not pale.      Findings: No rash.   Neurological:      Mental Status: He is alert and oriented to person, place, and time.         Assessment:       1. Chronic neck and back pain    2. Essential hypertension    3. Chronic prostatitis    4. Obesity (BMI 30-39.9)    5. Anxiety        Plan:    1. Referral to Pain Management and to PT       X-rays today    2. HTN- stable on Diovan 160 mg daily   3. Chronic prostatitis- pt has seen Urology    4. Obesity- pt advised on proper diet/exercise for weight loss   5. Anxiety- stable on Lexapro 10 mg daily

## 2020-07-07 ENCOUNTER — CLINICAL SUPPORT (OUTPATIENT)
Dept: REHABILITATION | Facility: HOSPITAL | Age: 80
End: 2020-07-07
Attending: INTERNAL MEDICINE
Payer: MEDICARE

## 2020-07-07 DIAGNOSIS — R29.3 POOR POSTURE: ICD-10-CM

## 2020-07-07 DIAGNOSIS — M54.2 CHRONIC NECK AND BACK PAIN: ICD-10-CM

## 2020-07-07 DIAGNOSIS — R29.898 DECREASED ROM OF NECK: ICD-10-CM

## 2020-07-07 DIAGNOSIS — M54.9 CHRONIC NECK AND BACK PAIN: ICD-10-CM

## 2020-07-07 DIAGNOSIS — G89.29 CHRONIC NECK AND BACK PAIN: ICD-10-CM

## 2020-07-07 PROCEDURE — 97161 PT EVAL LOW COMPLEX 20 MIN: CPT | Mod: HCNC

## 2020-07-07 PROCEDURE — 97110 THERAPEUTIC EXERCISES: CPT | Mod: HCNC
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
## 2020-07-16 ENCOUNTER — CLINICAL SUPPORT (OUTPATIENT)
Dept: REHABILITATION | Facility: HOSPITAL | Age: 80
End: 2020-07-16
Attending: INTERNAL MEDICINE
Payer: MEDICARE

## 2020-07-16 DIAGNOSIS — R29.3 POOR POSTURE: ICD-10-CM

## 2020-07-16 PROCEDURE — 97110 THERAPEUTIC EXERCISES: CPT | Mod: HCNC

## 2020-07-16 NOTE — PROGRESS NOTES
Physical Therapy Treatment Note     Name: Ajit SCHMIDT [facility]  Clinic Number: [id number]    Therapy Diagnosis:   Encounter Diagnosis   Name Primary?    Poor posture      Physician: Homer Sen, *    Visit Date: 7/16/2020    Physician Orders: PT Eval and Treat   Medical Diagnosis from Referral: M54.2,M54.9,G89.29 (ICD-10-CM) - Chronic neck and back pain  Evaluation Date: 7/7/2020  Authorization Period Expiration: TBD  Plan of Care Expiration: 9/18/2020  Visit # / Visits authorized: 2/ TBD     Time In: 1215  Time Out: 1256  Total Billable Time: 41 minutes     Visit: 90  Total: 203     Precautions: Standard      Subjective     Pt reports: he was compliant with his exercises at home and feels that the exercises are helping and he is building strength and confidence to hold his head up when walking    Objective     Ajit received therapeutic exercises to develop strength, endurance, ROM, flexibility, posture and core stabilization for 41 minutes including:    Scap Squeezing on 1/2 roll 20x   Pec Stretch 3x 30 sec  Pulleys flexion 4 min  Upper trap stretch 30 sec x 3  Cervical Retractions 5sec x20  Supine Cervical Rotations w/ Towel 10x ea way    Ajit received the following manual therapy techniques: Joint mobilizations and Soft tissue Mobilization were applied to the: Cervical Spine for 0 minutes, including:    Home Exercises Provided and Patient Education Provided     Education provided:   - Cont HEP  - Scap Squeezes   - Pec Stretch  Assessment     Patient tolerated initial follow up session without any pain or adverse effects. Patient reported improvements of symptoms after therex. Continue to progress.   Ajit is progressing well towards his goals.   Pt prognosis is Good.     Pt will continue to benefit from skilled outpatient physical therapy to address the deficits listed in the problem list box on initial evaluation, provide pt/family education and to maximize pt's level of independence in the home and  community environment.     Pt's spiritual, cultural and educational needs considered and pt agreeable to plan of care and goals.     Anticipated barriers to physical therapy: Chronic poor posture    Goals:   Long Term Goals (6 Weeks):   1. Pt will improve FOTO to </=25% limitation to improve perceived limitation with changing and maintaining mobility.  2. Pt will improve cervical AROM to WNL in all planes to improve cervical mobility for driving   3. Pt will improve UE MMTs 1 grade in all planes to improve strength for lifting and carrying tasks.  4. Pt will report no pain with lifting to promote physical activity.   5. Pt will report no pain with cervical AROM in all planes to promote QOL.  6. Pt will be independent with HEP to supplement PT in improving pain free cervical mobility        Plan   Plan of care Certification: 7/7/2020 to 9/18/2020.     Outpatient Physical Therapy 3 times weekly for 10 weeks to include the following interventions: Cervical/Lumbar Traction, Manual Therapy, Moist Heat/ Ice, Neuromuscular Re-ed, Patient Education, Self Care, Therapeutic Activites and Therapeutic Exercise.     Brain Vigil, PT

## 2020-07-23 ENCOUNTER — CLINICAL SUPPORT (OUTPATIENT)
Dept: REHABILITATION | Facility: HOSPITAL | Age: 80
End: 2020-07-23
Attending: INTERNAL MEDICINE
Payer: MEDICARE

## 2020-07-23 DIAGNOSIS — R29.3 POOR POSTURE: ICD-10-CM

## 2020-07-23 PROCEDURE — 97110 THERAPEUTIC EXERCISES: CPT | Mod: HCNC

## 2020-07-23 PROCEDURE — 97140 MANUAL THERAPY 1/> REGIONS: CPT | Mod: HCNC

## 2020-07-23 NOTE — PROGRESS NOTES
Physical Therapy Treatment Note     Name: Ajit SCHMIDT [facility] Number: [id number]    Therapy Diagnosis:   Encounter Diagnosis   Name Primary?    Poor posture      Physician: Homer Sen, *    Visit Date: 7/23/2020    Physician Orders: PT Eval and Treat   Medical Diagnosis from Referral: M54.2,M54.9,G89.29 (ICD-10-CM) - Chronic neck and back pain  Evaluation Date: 7/7/2020  Authorization Period Expiration: TBD  Plan of Care Expiration: 9/18/2020  Visit # / Visits authorized: 3/ TBD     Time In: 1215  Time Out: 100  Total Billable Time: 45 minutes     Visit: 90  Total: 293     Precautions: Standard      Subjective     Pt reports: he is feeling better and is able to maintain good posture. Patient reports being happy with his results    Objective     Ajit received therapeutic exercises to develop strength, endurance, ROM, flexibility, posture and core stabilization for 35 minutes including:    Scap Squeezing on 1/2 roll 20x   Pec Stretch 3x 30 sec  Pulleys flexion 4 min  Upper trap stretch 30 sec x 3  Cervical Retractions 5sec x20  Supine Cervical Rotations w/ Towel 10x ea way    Ajit received the following manual therapy techniques: Joint mobilizations and Soft tissue Mobilization were applied to the: Cervical Spine for 10 minutes, including:  Cervical Traction  Upper Trap Stretch     Home Exercises Provided and Patient Education Provided     Education provided:   - Updated HEP      Assessment   Patient newly tolerated manual therapy today without any adverse effects. Patient reported manual therapy loosened up neck. Continue to progress.   Ajit is progressing well towards his goals.   Pt prognosis is Good.     Pt will continue to benefit from skilled outpatient physical therapy to address the deficits listed in the problem list box on initial evaluation, provide pt/family education and to maximize pt's level of independence in the home and community environment.     Pt's spiritual, cultural and  educational needs considered and pt agreeable to plan of care and goals.     Anticipated barriers to physical therapy: Chronic poor posture    Goals:   Long Term Goals (6 Weeks):   1. Pt will improve FOTO to </=25% limitation to improve perceived limitation with changing and maintaining mobility.  2. Pt will improve cervical AROM to WNL in all planes to improve cervical mobility for driving   3. Pt will improve UE MMTs 1 grade in all planes to improve strength for lifting and carrying tasks.  4. Pt will report no pain with lifting to promote physical activity.   5. Pt will report no pain with cervical AROM in all planes to promote QOL.  6. Pt will be independent with HEP to supplement PT in improving pain free cervical mobility        Plan   Plan of care Certification: 7/7/2020 to 9/18/2020.     Outpatient Physical Therapy 3 times weekly for 10 weeks to include the following interventions: Cervical/Lumbar Traction, Manual Therapy, Moist Heat/ Ice, Neuromuscular Re-ed, Patient Education, Self Care, Therapeutic Activites and Therapeutic Exercise.     Brain Vigil, PT

## 2020-07-28 ENCOUNTER — CLINICAL SUPPORT (OUTPATIENT)
Dept: REHABILITATION | Facility: HOSPITAL | Age: 80
End: 2020-07-28
Attending: INTERNAL MEDICINE
Payer: MEDICARE

## 2020-07-28 DIAGNOSIS — R29.3 POOR POSTURE: ICD-10-CM

## 2020-07-28 PROCEDURE — 97140 MANUAL THERAPY 1/> REGIONS: CPT | Mod: HCNC

## 2020-07-28 PROCEDURE — 97110 THERAPEUTIC EXERCISES: CPT | Mod: HCNC

## 2020-07-28 NOTE — PROGRESS NOTES
Physical Therapy Treatment Note     Name: Ajit SCHMIDT [facility]  Clinic Number: [id number]    Therapy Diagnosis:   Encounter Diagnosis   Name Primary?    Poor posture      Physician: Homer Sen, *    Visit Date: 7/28/2020    Physician Orders: PT Eval and Treat   Medical Diagnosis from Referral: M54.2,M54.9,G89.29 (ICD-10-CM) - Chronic neck and back pain  Evaluation Date: 7/7/2020  Authorization Period Expiration: TBD  Plan of Care Expiration: 9/18/2020  Visit # / Visits authorized: 4/ TBD     Time In: 1127  Time Out: 1210  Total Billable Time: 43 minutes     Visit: 90  Total: 383     Precautions: Standard      Subjective     Pt reports: he is feeling a lot better, but still occasionally has problems holding his head up because his neck feels weak.     Objective     Ajit received therapeutic exercises to develop strength, endurance, ROM, flexibility, posture and core stabilization for 38 minutes including:      UBE 3/3 on 1/2 roll  Scap Squeezing on 1/2 roll 20x   Pec Stretch 3x 30 sec  Pulleys flexion 4 min  Upper trap stretch 30 sec x 3  Cervical Retractions 5sec x20  Supine Cervical Rotations w/ Towel 10x ea way    Ajit received the following manual therapy techniques: Joint mobilizations and Soft tissue Mobilization were applied to the: Cervical Spine for 5 minutes, including:  Cervical Traction  Upper Trap Stretch     Home Exercises Provided and Patient Education Provided     Education provided:   - Updated HEP      Assessment   Patient continues to tolerate therex and reports decreased symptoms after stretching and manual therapy. Patient required a few breaks during UBE because of fatigued shoulder and neck. Continue to progress.   Ajit is progressing well towards his goals.   Pt prognosis is Good.     Pt will continue to benefit from skilled outpatient physical therapy to address the deficits listed in the problem list box on initial evaluation, provide pt/family education and to maximize pt's  level of independence in the home and community environment.     Pt's spiritual, cultural and educational needs considered and pt agreeable to plan of care and goals.     Anticipated barriers to physical therapy: Chronic poor posture    Goals:   Long Term Goals (6 Weeks):   1. Pt will improve FOTO to </=25% limitation to improve perceived limitation with changing and maintaining mobility.  2. Pt will improve cervical AROM to WNL in all planes to improve cervical mobility for driving   3. Pt will improve UE MMTs 1 grade in all planes to improve strength for lifting and carrying tasks.  4. Pt will report no pain with lifting to promote physical activity.   5. Pt will report no pain with cervical AROM in all planes to promote QOL.  6. Pt will be independent with HEP to supplement PT in improving pain free cervical mobility        Plan   Plan of care Certification: 7/7/2020 to 9/18/2020.     Outpatient Physical Therapy 3 times weekly for 10 weeks to include the following interventions: Cervical/Lumbar Traction, Manual Therapy, Moist Heat/ Ice, Neuromuscular Re-ed, Patient Education, Self Care, Therapeutic Activites and Therapeutic Exercise.     Brain Vigil, PT

## 2020-07-30 ENCOUNTER — CLINICAL SUPPORT (OUTPATIENT)
Dept: REHABILITATION | Facility: HOSPITAL | Age: 80
End: 2020-07-30
Attending: INTERNAL MEDICINE
Payer: MEDICARE

## 2020-07-30 DIAGNOSIS — R29.3 POOR POSTURE: ICD-10-CM

## 2020-07-30 PROCEDURE — 97110 THERAPEUTIC EXERCISES: CPT | Mod: HCNC

## 2020-07-30 PROCEDURE — 97140 MANUAL THERAPY 1/> REGIONS: CPT | Mod: HCNC

## 2020-07-30 NOTE — PROGRESS NOTES
Physical Therapy Treatment Note     Name: Ajit SCHMIDT [facility] Number: [id number]    Therapy Diagnosis:   Encounter Diagnosis   Name Primary?    Poor posture      Physician: Homer Sen, *    Visit Date: 7/30/2020    Physician Orders: PT Eval and Treat   Medical Diagnosis from Referral: M54.2,M54.9,G89.29 (ICD-10-CM) - Chronic neck and back pain  Evaluation Date: 7/7/2020  Authorization Period Expiration: TBD  Plan of Care Expiration: 9/18/2020  Visit # / Visits authorized: 5/ TBD     Time In: 1215  Time Out: 1254  Total Billable Time: 39 minutes     Visit: 90  Total: 473     Precautions: Standard      Subjective     Pt reports: He continues to feel better and wants to come to therapy once every other week.   Objective     Ajit received therapeutic exercises to develop strength, endurance, ROM, flexibility, posture and core stabilization for 29 minutes including:      UBE 3/3 on 1/2 roll  Scap Squeezing on 1/2 roll 20x   Pec Stretch 3x 30 sec  Pulleys flexion 4 min  Upper trap stretch 30 sec x 3  Cervical Retractions 5sec x20  Supine Cervical Rotations w/ Towel 10x ea way    Ajit received the following manual therapy techniques: Joint mobilizations and Soft tissue Mobilization were applied to the: Cervical Spine for 10 minutes, including:  Cervical Traction  Upper Trap Stretch     Home Exercises Provided and Patient Education Provided     Education provided:   - Updated HEP      Assessment   Patient continues to tolerate therex and reports decreased symptoms after stretching and manual therapy. Patient required a few breaks during UBE because of fatigued shoulder and neck. Patient received HEP for seated chin retractions. Patient will receive therapy 1x every other week until the end of august. Continue to progress.   Ajit is progressing well towards his goals.   Pt prognosis is Good.     Pt will continue to benefit from skilled outpatient physical therapy to address the deficits listed in the  problem list box on initial evaluation, provide pt/family education and to maximize pt's level of independence in the home and community environment.     Pt's spiritual, cultural and educational needs considered and pt agreeable to plan of care and goals.     Anticipated barriers to physical therapy: Chronic poor posture    Goals:   Long Term Goals (6 Weeks):   1. Pt will improve FOTO to </=25% limitation to improve perceived limitation with changing and maintaining mobility.  2. Pt will improve cervical AROM to WNL in all planes to improve cervical mobility for driving   3. Pt will improve UE MMTs 1 grade in all planes to improve strength for lifting and carrying tasks.  4. Pt will report no pain with lifting to promote physical activity.   5. Pt will report no pain with cervical AROM in all planes to promote QOL.  6. Pt will be independent with HEP to supplement PT in improving pain free cervical mobility        Plan   Plan of care Certification: 7/7/2020 to 9/18/2020.     Outpatient Physical Therapy 3 times weekly for 10 weeks to include the following interventions: Cervical/Lumbar Traction, Manual Therapy, Moist Heat/ Ice, Neuromuscular Re-ed, Patient Education, Self Care, Therapeutic Activites and Therapeutic Exercise.     Brain Vigil, PT

## 2020-09-01 ENCOUNTER — CLINICAL SUPPORT (OUTPATIENT)
Dept: REHABILITATION | Facility: HOSPITAL | Age: 80
End: 2020-09-01
Attending: INTERNAL MEDICINE
Payer: MEDICARE

## 2020-09-01 DIAGNOSIS — R29.3 POOR POSTURE: ICD-10-CM

## 2020-09-01 PROCEDURE — 97110 THERAPEUTIC EXERCISES: CPT | Mod: HCNC
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
## 2021-01-27 ENCOUNTER — TELEPHONE (OUTPATIENT)
Dept: INTERNAL MEDICINE | Facility: CLINIC | Age: 81
End: 2021-01-27

## 2021-01-27 DIAGNOSIS — E78.5 DYSLIPIDEMIA: ICD-10-CM

## 2021-01-27 DIAGNOSIS — I10 ESSENTIAL HYPERTENSION: Primary | ICD-10-CM

## 2021-01-27 DIAGNOSIS — E55.9 VITAMIN D DEFICIENCY: ICD-10-CM

## 2021-01-27 DIAGNOSIS — Z00.00 ANNUAL PHYSICAL EXAM: ICD-10-CM

## 2021-02-26 ENCOUNTER — IMMUNIZATION (OUTPATIENT)
Dept: INTERNAL MEDICINE | Facility: CLINIC | Age: 81
End: 2021-02-26
Payer: MEDICARE

## 2021-02-26 DIAGNOSIS — Z23 NEED FOR VACCINATION: Primary | ICD-10-CM

## 2021-02-26 PROCEDURE — 91300 COVID-19, MRNA, LNP-S, PF, 30 MCG/0.3 ML DOSE VACCINE: CPT | Mod: PBBFAC | Performed by: INTERNAL MEDICINE

## 2021-03-01 RX ORDER — ALPRAZOLAM 0.25 MG/1
0.25 TABLET ORAL 2 TIMES DAILY
Qty: 60 TABLET | Refills: 1 | Status: SHIPPED | OUTPATIENT
Start: 2021-03-01 | End: 2021-07-26 | Stop reason: SDUPTHER

## 2021-03-18 ENCOUNTER — LAB VISIT (OUTPATIENT)
Dept: LAB | Facility: HOSPITAL | Age: 81
End: 2021-03-18
Attending: INTERNAL MEDICINE
Payer: MEDICARE

## 2021-03-18 DIAGNOSIS — E78.5 DYSLIPIDEMIA: ICD-10-CM

## 2021-03-18 DIAGNOSIS — I10 ESSENTIAL HYPERTENSION: ICD-10-CM

## 2021-03-18 DIAGNOSIS — E55.9 VITAMIN D DEFICIENCY: ICD-10-CM

## 2021-03-18 DIAGNOSIS — Z00.00 ANNUAL PHYSICAL EXAM: ICD-10-CM

## 2021-03-18 LAB
25(OH)D3+25(OH)D2 SERPL-MCNC: 50 NG/ML (ref 30–96)
ALBUMIN SERPL BCP-MCNC: 4 G/DL (ref 3.5–5.2)
ALP SERPL-CCNC: 74 U/L (ref 55–135)
ALT SERPL W/O P-5'-P-CCNC: 19 U/L (ref 10–44)
ANION GAP SERPL CALC-SCNC: 7 MMOL/L (ref 8–16)
AST SERPL-CCNC: 32 U/L (ref 10–40)
BASOPHILS # BLD AUTO: 0.04 K/UL (ref 0–0.2)
BASOPHILS NFR BLD: 0.6 % (ref 0–1.9)
BILIRUB SERPL-MCNC: 0.7 MG/DL (ref 0.1–1)
BUN SERPL-MCNC: 22 MG/DL (ref 8–23)
CALCIUM SERPL-MCNC: 9.3 MG/DL (ref 8.7–10.5)
CHLORIDE SERPL-SCNC: 104 MMOL/L (ref 95–110)
CHOLEST SERPL-MCNC: 206 MG/DL (ref 120–199)
CHOLEST/HDLC SERPL: 4.1 {RATIO} (ref 2–5)
CO2 SERPL-SCNC: 30 MMOL/L (ref 23–29)
CREAT SERPL-MCNC: 0.9 MG/DL (ref 0.5–1.4)
DIFFERENTIAL METHOD: ABNORMAL
EOSINOPHIL # BLD AUTO: 0.2 K/UL (ref 0–0.5)
EOSINOPHIL NFR BLD: 3.2 % (ref 0–8)
ERYTHROCYTE [DISTWIDTH] IN BLOOD BY AUTOMATED COUNT: 12.6 % (ref 11.5–14.5)
EST. GFR  (AFRICAN AMERICAN): >60 ML/MIN/1.73 M^2
EST. GFR  (NON AFRICAN AMERICAN): >60 ML/MIN/1.73 M^2
GLUCOSE SERPL-MCNC: 129 MG/DL (ref 70–110)
HCT VFR BLD AUTO: 45.4 % (ref 40–54)
HDLC SERPL-MCNC: 50 MG/DL (ref 40–75)
HDLC SERPL: 24.3 % (ref 20–50)
HGB BLD-MCNC: 14.9 G/DL (ref 14–18)
IMM GRANULOCYTES # BLD AUTO: 0.02 K/UL (ref 0–0.04)
IMM GRANULOCYTES NFR BLD AUTO: 0.3 % (ref 0–0.5)
LDLC SERPL CALC-MCNC: 141 MG/DL (ref 63–159)
LYMPHOCYTES # BLD AUTO: 1.6 K/UL (ref 1–4.8)
LYMPHOCYTES NFR BLD: 23.1 % (ref 18–48)
MCH RBC QN AUTO: 32.7 PG (ref 27–31)
MCHC RBC AUTO-ENTMCNC: 32.8 G/DL (ref 32–36)
MCV RBC AUTO: 100 FL (ref 82–98)
MONOCYTES # BLD AUTO: 0.5 K/UL (ref 0.3–1)
MONOCYTES NFR BLD: 7.5 % (ref 4–15)
NEUTROPHILS # BLD AUTO: 4.6 K/UL (ref 1.8–7.7)
NEUTROPHILS NFR BLD: 65.3 % (ref 38–73)
NONHDLC SERPL-MCNC: 156 MG/DL
NRBC BLD-RTO: 0 /100 WBC
PLATELET # BLD AUTO: 181 K/UL (ref 150–350)
PMV BLD AUTO: 10.2 FL (ref 9.2–12.9)
POTASSIUM SERPL-SCNC: 4.3 MMOL/L (ref 3.5–5.1)
PROT SERPL-MCNC: 7 G/DL (ref 6–8.4)
RBC # BLD AUTO: 4.56 M/UL (ref 4.6–6.2)
SODIUM SERPL-SCNC: 141 MMOL/L (ref 136–145)
TRIGL SERPL-MCNC: 75 MG/DL (ref 30–150)
TSH SERPL DL<=0.005 MIU/L-ACNC: 1.76 UIU/ML (ref 0.4–4)
WBC # BLD AUTO: 7.09 K/UL (ref 3.9–12.7)

## 2021-03-18 PROCEDURE — 82306 VITAMIN D 25 HYDROXY: CPT | Performed by: INTERNAL MEDICINE

## 2021-03-18 PROCEDURE — 84443 ASSAY THYROID STIM HORMONE: CPT | Performed by: INTERNAL MEDICINE

## 2021-03-18 PROCEDURE — 80061 LIPID PANEL: CPT | Performed by: INTERNAL MEDICINE

## 2021-03-18 PROCEDURE — 80053 COMPREHEN METABOLIC PANEL: CPT | Performed by: INTERNAL MEDICINE

## 2021-03-18 PROCEDURE — 36415 COLL VENOUS BLD VENIPUNCTURE: CPT | Mod: PO | Performed by: INTERNAL MEDICINE

## 2021-03-18 PROCEDURE — 85025 COMPLETE CBC W/AUTO DIFF WBC: CPT | Performed by: INTERNAL MEDICINE

## 2021-03-19 ENCOUNTER — IMMUNIZATION (OUTPATIENT)
Dept: INTERNAL MEDICINE | Facility: CLINIC | Age: 81
End: 2021-03-19
Payer: MEDICARE

## 2021-03-19 DIAGNOSIS — Z23 NEED FOR VACCINATION: Primary | ICD-10-CM

## 2021-03-19 PROCEDURE — 91300 COVID-19, MRNA, LNP-S, PF, 30 MCG/0.3 ML DOSE VACCINE: CPT | Mod: HCNC,PBBFAC | Performed by: INTERNAL MEDICINE

## 2021-03-19 PROCEDURE — 0002A COVID-19, MRNA, LNP-S, PF, 30 MCG/0.3 ML DOSE VACCINE: CPT | Mod: HCNC,PBBFAC | Performed by: INTERNAL MEDICINE

## 2021-03-25 ENCOUNTER — LAB VISIT (OUTPATIENT)
Dept: LAB | Facility: HOSPITAL | Age: 81
End: 2021-03-25
Attending: INTERNAL MEDICINE
Payer: MEDICARE

## 2021-03-25 ENCOUNTER — OFFICE VISIT (OUTPATIENT)
Dept: INTERNAL MEDICINE | Facility: CLINIC | Age: 81
End: 2021-03-25
Payer: MEDICARE

## 2021-03-25 VITALS
OXYGEN SATURATION: 97 % | HEIGHT: 65 IN | SYSTOLIC BLOOD PRESSURE: 138 MMHG | HEART RATE: 82 BPM | DIASTOLIC BLOOD PRESSURE: 80 MMHG | BODY MASS INDEX: 29.09 KG/M2 | TEMPERATURE: 98 F | WEIGHT: 174.63 LBS

## 2021-03-25 DIAGNOSIS — E66.9 OBESITY (BMI 30-39.9): ICD-10-CM

## 2021-03-25 DIAGNOSIS — R73.9 ELEVATED BLOOD SUGAR: ICD-10-CM

## 2021-03-25 DIAGNOSIS — R31.9 HEMATURIA, UNSPECIFIED TYPE: ICD-10-CM

## 2021-03-25 DIAGNOSIS — N41.1 CHRONIC PROSTATITIS: ICD-10-CM

## 2021-03-25 DIAGNOSIS — C44.91 BASAL CELL CARCINOMA (BCC), UNSPECIFIED SITE: ICD-10-CM

## 2021-03-25 DIAGNOSIS — I10 ESSENTIAL HYPERTENSION: ICD-10-CM

## 2021-03-25 DIAGNOSIS — M47.812 OSTEOARTHRITIS OF CERVICAL SPINE, UNSPECIFIED SPINAL OSTEOARTHRITIS COMPLICATION STATUS: ICD-10-CM

## 2021-03-25 DIAGNOSIS — F41.9 ANXIETY: ICD-10-CM

## 2021-03-25 DIAGNOSIS — Z00.00 ANNUAL PHYSICAL EXAM: Primary | ICD-10-CM

## 2021-03-25 PROCEDURE — 1126F PR PAIN SEVERITY QUANTIFIED, NO PAIN PRESENT: ICD-10-PCS | Mod: S$GLB,,, | Performed by: INTERNAL MEDICINE

## 2021-03-25 PROCEDURE — 3075F PR MOST RECENT SYSTOLIC BLOOD PRESS GE 130-139MM HG: ICD-10-PCS | Mod: CPTII,S$GLB,, | Performed by: INTERNAL MEDICINE

## 2021-03-25 PROCEDURE — 3288F PR FALLS RISK ASSESSMENT DOCUMENTED: ICD-10-PCS | Mod: CPTII,S$GLB,, | Performed by: INTERNAL MEDICINE

## 2021-03-25 PROCEDURE — 99999 PR PBB SHADOW E&M-EST. PATIENT-LVL IV: ICD-10-PCS | Mod: PBBFAC,,, | Performed by: INTERNAL MEDICINE

## 2021-03-25 PROCEDURE — 1126F AMNT PAIN NOTED NONE PRSNT: CPT | Mod: S$GLB,,, | Performed by: INTERNAL MEDICINE

## 2021-03-25 PROCEDURE — 99397 PER PM REEVAL EST PAT 65+ YR: CPT | Mod: S$GLB,,, | Performed by: INTERNAL MEDICINE

## 2021-03-25 PROCEDURE — 3075F SYST BP GE 130 - 139MM HG: CPT | Mod: CPTII,S$GLB,, | Performed by: INTERNAL MEDICINE

## 2021-03-25 PROCEDURE — 3288F FALL RISK ASSESSMENT DOCD: CPT | Mod: CPTII,S$GLB,, | Performed by: INTERNAL MEDICINE

## 2021-03-25 PROCEDURE — 83036 HEMOGLOBIN GLYCOSYLATED A1C: CPT | Performed by: INTERNAL MEDICINE

## 2021-03-25 PROCEDURE — 99397 PR PREVENTIVE VISIT,EST,65 & OVER: ICD-10-PCS | Mod: S$GLB,,, | Performed by: INTERNAL MEDICINE

## 2021-03-25 PROCEDURE — 1101F PT FALLS ASSESS-DOCD LE1/YR: CPT | Mod: CPTII,S$GLB,, | Performed by: INTERNAL MEDICINE

## 2021-03-25 PROCEDURE — 3079F DIAST BP 80-89 MM HG: CPT | Mod: CPTII,S$GLB,, | Performed by: INTERNAL MEDICINE

## 2021-03-25 PROCEDURE — 36415 COLL VENOUS BLD VENIPUNCTURE: CPT | Mod: PO | Performed by: INTERNAL MEDICINE

## 2021-03-25 PROCEDURE — 99999 PR PBB SHADOW E&M-EST. PATIENT-LVL IV: CPT | Mod: PBBFAC,,, | Performed by: INTERNAL MEDICINE

## 2021-03-25 PROCEDURE — 1101F PR PT FALLS ASSESS DOC 0-1 FALLS W/OUT INJ PAST YR: ICD-10-PCS | Mod: CPTII,S$GLB,, | Performed by: INTERNAL MEDICINE

## 2021-03-25 PROCEDURE — 3079F PR MOST RECENT DIASTOLIC BLOOD PRESSURE 80-89 MM HG: ICD-10-PCS | Mod: CPTII,S$GLB,, | Performed by: INTERNAL MEDICINE

## 2021-03-25 RX ORDER — TOBRAMYCIN AND DEXAMETHASONE 3; 1 MG/ML; MG/ML
SUSPENSION/ DROPS OPHTHALMIC
COMMUNITY
Start: 2021-03-02 | End: 2021-07-26

## 2021-03-25 RX ORDER — TRIAMCINOLONE ACETONIDE 1 MG/ML
LOTION TOPICAL
COMMUNITY
Start: 2021-03-04

## 2021-03-25 RX ORDER — A/SINGAPORE/GP1908/2015 IVR-180 (AN A/MICHIGAN/45/2015 (H1N1)PDM09-LIKE VIRUS, A/HONG KONG/4801/2014, NYMC X-263B (H3N2) (AN A/HONG KONG/4801/2014-LIKE VIRUS), AND B/BRISBANE/60/2008, WILD TYPE (A B/BRISBANE/60/2008-LIKE VIRUS) 15; 15; 15 UG/.5ML; UG/.5ML; UG/.5ML
INJECTION, SUSPENSION INTRAMUSCULAR
COMMUNITY
Start: 2021-03-02 | End: 2021-07-26 | Stop reason: ALTCHOICE

## 2021-03-25 RX ORDER — DESOXIMETASONE 2.5 MG/G
OINTMENT TOPICAL
COMMUNITY
Start: 2021-03-04 | End: 2021-07-26

## 2021-03-25 RX ORDER — BIMATOPROST 0.1 MG/ML
SOLUTION/ DROPS OPHTHALMIC
COMMUNITY
Start: 2021-03-02

## 2021-03-25 RX ORDER — AMOXICILLIN 500 MG/1
TABLET, FILM COATED ORAL
COMMUNITY
Start: 2021-03-02 | End: 2021-07-26 | Stop reason: ALTCHOICE

## 2021-03-26 LAB
ESTIMATED AVG GLUCOSE: 140 MG/DL (ref 68–131)
HBA1C MFR BLD: 6.5 % (ref 4–5.6)

## 2021-04-05 ENCOUNTER — TELEPHONE (OUTPATIENT)
Dept: INTERNAL MEDICINE | Facility: CLINIC | Age: 81
End: 2021-04-05

## 2021-05-07 DIAGNOSIS — I10 ESSENTIAL HYPERTENSION: Primary | ICD-10-CM

## 2021-05-10 RX ORDER — TELMISARTAN 40 MG/1
40 TABLET ORAL DAILY
Qty: 90 TABLET | Refills: 3 | Status: SHIPPED | OUTPATIENT
Start: 2021-05-10 | End: 2022-09-19 | Stop reason: SDUPTHER

## 2021-05-24 ENCOUNTER — PES CALL (OUTPATIENT)
Dept: ADMINISTRATIVE | Facility: CLINIC | Age: 81
End: 2021-05-24

## 2021-07-26 ENCOUNTER — OFFICE VISIT (OUTPATIENT)
Dept: INTERNAL MEDICINE | Facility: CLINIC | Age: 81
End: 2021-07-26
Payer: MEDICARE

## 2021-07-26 VITALS
BODY MASS INDEX: 28.88 KG/M2 | SYSTOLIC BLOOD PRESSURE: 146 MMHG | TEMPERATURE: 98 F | HEIGHT: 66 IN | WEIGHT: 179.69 LBS | DIASTOLIC BLOOD PRESSURE: 80 MMHG | HEART RATE: 80 BPM | RESPIRATION RATE: 16 BRPM

## 2021-07-26 DIAGNOSIS — I10 ESSENTIAL HYPERTENSION: ICD-10-CM

## 2021-07-26 DIAGNOSIS — R60.0 FLUID RETENTION IN LEGS: ICD-10-CM

## 2021-07-26 DIAGNOSIS — F41.9 ANXIETY: ICD-10-CM

## 2021-07-26 DIAGNOSIS — Z01.818 PRE-OP EXAMINATION: Primary | ICD-10-CM

## 2021-07-26 DIAGNOSIS — H26.9 CATARACT, UNSPECIFIED CATARACT TYPE, UNSPECIFIED LATERALITY: ICD-10-CM

## 2021-07-26 PROCEDURE — 99214 PR OFFICE/OUTPT VISIT, EST, LEVL IV, 30-39 MIN: ICD-10-PCS | Mod: S$GLB,,, | Performed by: INTERNAL MEDICINE

## 2021-07-26 PROCEDURE — 3077F PR MOST RECENT SYSTOLIC BLOOD PRESSURE >= 140 MM HG: ICD-10-PCS | Mod: CPTII,S$GLB,, | Performed by: INTERNAL MEDICINE

## 2021-07-26 PROCEDURE — 3079F PR MOST RECENT DIASTOLIC BLOOD PRESSURE 80-89 MM HG: ICD-10-PCS | Mod: CPTII,S$GLB,, | Performed by: INTERNAL MEDICINE

## 2021-07-26 PROCEDURE — 1159F PR MEDICATION LIST DOCUMENTED IN MEDICAL RECORD: ICD-10-PCS | Mod: CPTII,S$GLB,, | Performed by: INTERNAL MEDICINE

## 2021-07-26 PROCEDURE — 93005 ELECTROCARDIOGRAM TRACING: CPT | Mod: S$GLB,,, | Performed by: INTERNAL MEDICINE

## 2021-07-26 PROCEDURE — 1160F RVW MEDS BY RX/DR IN RCRD: CPT | Mod: CPTII,S$GLB,, | Performed by: INTERNAL MEDICINE

## 2021-07-26 PROCEDURE — 99499 RISK ADDL DX/OHS AUDIT: ICD-10-PCS | Mod: HCNC,S$GLB,, | Performed by: INTERNAL MEDICINE

## 2021-07-26 PROCEDURE — 1126F PR PAIN SEVERITY QUANTIFIED, NO PAIN PRESENT: ICD-10-PCS | Mod: CPTII,S$GLB,, | Performed by: INTERNAL MEDICINE

## 2021-07-26 PROCEDURE — 3288F PR FALLS RISK ASSESSMENT DOCUMENTED: ICD-10-PCS | Mod: CPTII,S$GLB,, | Performed by: INTERNAL MEDICINE

## 2021-07-26 PROCEDURE — 99214 OFFICE O/P EST MOD 30 MIN: CPT | Mod: S$GLB,,, | Performed by: INTERNAL MEDICINE

## 2021-07-26 PROCEDURE — 1160F PR REVIEW ALL MEDS BY PRESCRIBER/CLIN PHARMACIST DOCUMENTED: ICD-10-PCS | Mod: CPTII,S$GLB,, | Performed by: INTERNAL MEDICINE

## 2021-07-26 PROCEDURE — 3079F DIAST BP 80-89 MM HG: CPT | Mod: CPTII,S$GLB,, | Performed by: INTERNAL MEDICINE

## 2021-07-26 PROCEDURE — 1126F AMNT PAIN NOTED NONE PRSNT: CPT | Mod: CPTII,S$GLB,, | Performed by: INTERNAL MEDICINE

## 2021-07-26 PROCEDURE — 1101F PT FALLS ASSESS-DOCD LE1/YR: CPT | Mod: CPTII,S$GLB,, | Performed by: INTERNAL MEDICINE

## 2021-07-26 PROCEDURE — 3077F SYST BP >= 140 MM HG: CPT | Mod: CPTII,S$GLB,, | Performed by: INTERNAL MEDICINE

## 2021-07-26 PROCEDURE — 1101F PR PT FALLS ASSESS DOC 0-1 FALLS W/OUT INJ PAST YR: ICD-10-PCS | Mod: CPTII,S$GLB,, | Performed by: INTERNAL MEDICINE

## 2021-07-26 PROCEDURE — 93005 EKG 12-LEAD: ICD-10-PCS | Mod: S$GLB,,, | Performed by: INTERNAL MEDICINE

## 2021-07-26 PROCEDURE — 1159F MED LIST DOCD IN RCRD: CPT | Mod: CPTII,S$GLB,, | Performed by: INTERNAL MEDICINE

## 2021-07-26 PROCEDURE — 99999 PR PBB SHADOW E&M-EST. PATIENT-LVL IV: CPT | Mod: PBBFAC,,, | Performed by: INTERNAL MEDICINE

## 2021-07-26 PROCEDURE — 93010 ELECTROCARDIOGRAM REPORT: CPT | Mod: S$GLB,,, | Performed by: INTERNAL MEDICINE

## 2021-07-26 PROCEDURE — 93010 EKG 12-LEAD: ICD-10-PCS | Mod: S$GLB,,, | Performed by: INTERNAL MEDICINE

## 2021-07-26 PROCEDURE — 99999 PR PBB SHADOW E&M-EST. PATIENT-LVL IV: ICD-10-PCS | Mod: PBBFAC,,, | Performed by: INTERNAL MEDICINE

## 2021-07-26 PROCEDURE — 3288F FALL RISK ASSESSMENT DOCD: CPT | Mod: CPTII,S$GLB,, | Performed by: INTERNAL MEDICINE

## 2021-07-26 PROCEDURE — 99499 UNLISTED E&M SERVICE: CPT | Mod: HCNC,S$GLB,, | Performed by: INTERNAL MEDICINE

## 2021-07-26 RX ORDER — ALPRAZOLAM 0.25 MG/1
0.25 TABLET ORAL 2 TIMES DAILY
Qty: 60 TABLET | Refills: 1 | Status: SHIPPED | OUTPATIENT
Start: 2021-07-26 | End: 2022-02-17

## 2021-07-28 ENCOUNTER — TELEPHONE (OUTPATIENT)
Dept: INTERNAL MEDICINE | Facility: CLINIC | Age: 81
End: 2021-07-28

## 2021-09-16 ENCOUNTER — OFFICE VISIT (OUTPATIENT)
Dept: INTERNAL MEDICINE | Facility: CLINIC | Age: 81
End: 2021-09-16
Payer: MEDICARE

## 2021-09-16 VITALS
HEIGHT: 66 IN | BODY MASS INDEX: 26.75 KG/M2 | WEIGHT: 166.44 LBS | HEART RATE: 103 BPM | DIASTOLIC BLOOD PRESSURE: 62 MMHG | SYSTOLIC BLOOD PRESSURE: 112 MMHG | TEMPERATURE: 98 F | OXYGEN SATURATION: 93 %
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
## 2021-09-16 DIAGNOSIS — I10 ESSENTIAL HYPERTENSION: ICD-10-CM

## 2021-09-16 PROCEDURE — 99214 PR OFFICE/OUTPT VISIT, EST, LEVL IV, 30-39 MIN: ICD-10-PCS | Mod: HCNC,S$GLB,, | Performed by: INTERNAL MEDICINE

## 2021-09-16 PROCEDURE — 1159F MED LIST DOCD IN RCRD: CPT | Mod: HCNC,CPTII,S$GLB, | Performed by: INTERNAL MEDICINE

## 2021-09-16 PROCEDURE — 99214 OFFICE O/P EST MOD 30 MIN: CPT | Mod: HCNC,S$GLB,, | Performed by: INTERNAL MEDICINE

## 2021-09-16 PROCEDURE — 1100F PR PT FALLS ASSESS DOC 2+ FALLS/FALL W/INJURY/YR: ICD-10-PCS | Mod: HCNC,CPTII,S$GLB, | Performed by: INTERNAL MEDICINE

## 2021-09-16 PROCEDURE — 3074F SYST BP LT 130 MM HG: CPT | Mod: HCNC,CPTII,S$GLB, | Performed by: INTERNAL MEDICINE

## 2021-09-16 PROCEDURE — 99999 PR PBB SHADOW E&M-EST. PATIENT-LVL IV: CPT | Mod: PBBFAC,HCNC,, | Performed by: INTERNAL MEDICINE

## 2021-09-16 PROCEDURE — 1125F AMNT PAIN NOTED PAIN PRSNT: CPT | Mod: HCNC,CPTII,S$GLB, | Performed by: INTERNAL MEDICINE

## 2021-09-16 PROCEDURE — 3074F PR MOST RECENT SYSTOLIC BLOOD PRESSURE < 130 MM HG: ICD-10-PCS | Mod: HCNC,CPTII,S$GLB, | Performed by: INTERNAL MEDICINE

## 2021-09-16 PROCEDURE — 99999 PR PBB SHADOW E&M-EST. PATIENT-LVL IV: ICD-10-PCS | Mod: PBBFAC,HCNC,, | Performed by: INTERNAL MEDICINE

## 2021-09-16 PROCEDURE — 1125F PR PAIN SEVERITY QUANTIFIED, PAIN PRESENT: ICD-10-PCS | Mod: HCNC,CPTII,S$GLB, | Performed by: INTERNAL MEDICINE

## 2021-09-16 PROCEDURE — 3288F FALL RISK ASSESSMENT DOCD: CPT | Mod: HCNC,CPTII,S$GLB, | Performed by: INTERNAL MEDICINE

## 2021-09-16 PROCEDURE — 3288F PR FALLS RISK ASSESSMENT DOCUMENTED: ICD-10-PCS | Mod: HCNC,CPTII,S$GLB, | Performed by: INTERNAL MEDICINE

## 2021-09-16 PROCEDURE — 1159F PR MEDICATION LIST DOCUMENTED IN MEDICAL RECORD: ICD-10-PCS | Mod: HCNC,CPTII,S$GLB, | Performed by: INTERNAL MEDICINE

## 2021-09-16 PROCEDURE — 3078F PR MOST RECENT DIASTOLIC BLOOD PRESSURE < 80 MM HG: ICD-10-PCS | Mod: HCNC,CPTII,S$GLB, | Performed by: INTERNAL MEDICINE

## 2021-09-16 PROCEDURE — 1100F PTFALLS ASSESS-DOCD GE2>/YR: CPT | Mod: HCNC,CPTII,S$GLB, | Performed by: INTERNAL MEDICINE

## 2021-09-16 PROCEDURE — 3078F DIAST BP <80 MM HG: CPT | Mod: HCNC,CPTII,S$GLB, | Performed by: INTERNAL MEDICINE

## 2021-09-16 RX ORDER — METHYLPREDNISOLONE 4 MG/1
TABLET ORAL
COMMUNITY
Start: 2021-09-07 | End: 2021-10-26 | Stop reason: ALTCHOICE

## 2021-09-16 RX ORDER — BROMFENAC SODIUM 0.7 MG/ML
1 SOLUTION/ DROPS OPHTHALMIC DAILY
COMMUNITY
Start: 2021-07-30 | End: 2021-10-26 | Stop reason: ALTCHOICE

## 2021-09-16 RX ORDER — DOXYCYCLINE HYCLATE 100 MG
TABLET ORAL
COMMUNITY
Start: 2021-09-02 | End: 2021-10-26 | Stop reason: ALTCHOICE

## 2021-09-16 RX ORDER — LOTEPREDNOL ETABONATE 3.8 MG/G
1 GEL OPHTHALMIC 3 TIMES DAILY
COMMUNITY
Start: 2021-07-30

## 2021-09-16 RX ORDER — OFLOXACIN 3 MG/ML
SOLUTION/ DROPS OPHTHALMIC
COMMUNITY
Start: 2021-09-02 | End: 2021-10-26 | Stop reason: ALTCHOICE

## 2021-09-16 RX ORDER — NEOMYCIN SULFATE, POLYMYXIN B SULFATE, AND DEXAMETHASONE 3.5; 10000; 1 MG/G; [USP'U]/G; MG/G
OINTMENT OPHTHALMIC
COMMUNITY
Start: 2021-09-07

## 2021-09-21 DIAGNOSIS — I10 ESSENTIAL HYPERTENSION: Primary | ICD-10-CM

## 2021-09-22 RX ORDER — ESCITALOPRAM OXALATE 10 MG/1
TABLET ORAL
Qty: 90 TABLET | Refills: 3 | Status: SHIPPED | OUTPATIENT
Start: 2021-09-22 | End: 2023-04-21 | Stop reason: SDUPTHER

## 2021-10-12 RX ORDER — NABUMETONE 750 MG/1
TABLET, FILM COATED ORAL
Qty: 180 TABLET | Refills: 0 | Status: SHIPPED | OUTPATIENT
Start: 2021-10-12 | End: 2022-01-10

## 2021-10-18 ENCOUNTER — LAB VISIT (OUTPATIENT)
Dept: LAB | Facility: HOSPITAL | Age: 81
End: 2021-10-18
Attending: INTERNAL MEDICINE
Payer: MEDICARE

## 2021-10-18 DIAGNOSIS — I10 ESSENTIAL HYPERTENSION: ICD-10-CM

## 2021-10-18 LAB
ALBUMIN SERPL BCP-MCNC: 4 G/DL (ref 3.5–5.2)
ALP SERPL-CCNC: 76 U/L (ref 55–135)
ALT SERPL W/O P-5'-P-CCNC: 16 U/L (ref 10–44)
ANION GAP SERPL CALC-SCNC: 7 MMOL/L (ref 8–16)
AST SERPL-CCNC: 22 U/L (ref 10–40)
BASOPHILS # BLD AUTO: 0.06 K/UL (ref 0–0.2)
BASOPHILS NFR BLD: 0.8 % (ref 0–1.9)
BILIRUB SERPL-MCNC: 0.7 MG/DL (ref 0.1–1)
BUN SERPL-MCNC: 19 MG/DL (ref 8–23)
CALCIUM SERPL-MCNC: 9.8 MG/DL (ref 8.7–10.5)
CHLORIDE SERPL-SCNC: 99 MMOL/L (ref 95–110)
CO2 SERPL-SCNC: 32 MMOL/L (ref 23–29)
CREAT SERPL-MCNC: 0.8 MG/DL (ref 0.5–1.4)
DIFFERENTIAL METHOD: ABNORMAL
EOSINOPHIL # BLD AUTO: 0.2 K/UL (ref 0–0.5)
EOSINOPHIL NFR BLD: 2.9 % (ref 0–8)
ERYTHROCYTE [DISTWIDTH] IN BLOOD BY AUTOMATED COUNT: 12.5 % (ref 11.5–14.5)
EST. GFR  (AFRICAN AMERICAN): >60 ML/MIN/1.73 M^2
EST. GFR  (NON AFRICAN AMERICAN): >60 ML/MIN/1.73 M^2
GLUCOSE SERPL-MCNC: 142 MG/DL (ref 70–110)
HCT VFR BLD AUTO: 43.3 % (ref 40–54)
HGB BLD-MCNC: 14.8 G/DL (ref 14–18)
IMM GRANULOCYTES # BLD AUTO: 0.03 K/UL (ref 0–0.04)
IMM GRANULOCYTES NFR BLD AUTO: 0.4 % (ref 0–0.5)
LYMPHOCYTES # BLD AUTO: 1.2 K/UL (ref 1–4.8)
LYMPHOCYTES NFR BLD: 16 % (ref 18–48)
MCH RBC QN AUTO: 34.3 PG (ref 27–31)
MCHC RBC AUTO-ENTMCNC: 34.2 G/DL (ref 32–36)
MCV RBC AUTO: 100 FL (ref 82–98)
MONOCYTES # BLD AUTO: 0.5 K/UL (ref 0.3–1)
MONOCYTES NFR BLD: 6.6 % (ref 4–15)
NEUTROPHILS # BLD AUTO: 5.5 K/UL (ref 1.8–7.7)
NEUTROPHILS NFR BLD: 73.3 % (ref 38–73)
NRBC BLD-RTO: 0 /100 WBC
PLATELET # BLD AUTO: 173 K/UL (ref 150–450)
PMV BLD AUTO: 10.4 FL (ref 9.2–12.9)
POTASSIUM SERPL-SCNC: 4.1 MMOL/L (ref 3.5–5.1)
PROT SERPL-MCNC: 6.8 G/DL (ref 6–8.4)
RBC # BLD AUTO: 4.32 M/UL (ref 4.6–6.2)
SODIUM SERPL-SCNC: 138 MMOL/L (ref 136–145)
WBC # BLD AUTO: 7.48 K/UL (ref 3.9–12.7)

## 2021-10-18 PROCEDURE — 85025 COMPLETE CBC W/AUTO DIFF WBC: CPT | Mod: HCNC | Performed by: INTERNAL MEDICINE

## 2021-10-18 PROCEDURE — 80053 COMPREHEN METABOLIC PANEL: CPT | Mod: HCNC | Performed by: INTERNAL MEDICINE

## 2021-10-18 PROCEDURE — 36415 COLL VENOUS BLD VENIPUNCTURE: CPT | Mod: HCNC,PO | Performed by: INTERNAL MEDICINE

## 2021-10-26 ENCOUNTER — OFFICE VISIT (OUTPATIENT)
Dept: INTERNAL MEDICINE | Facility: CLINIC | Age: 81
End: 2021-10-26
Payer: MEDICARE

## 2021-10-26 ENCOUNTER — LAB VISIT (OUTPATIENT)
Dept: LAB | Facility: HOSPITAL | Age: 81
End: 2021-10-26
Attending: INTERNAL MEDICINE
Payer: MEDICARE

## 2021-10-26 VITALS
HEART RATE: 86 BPM | BODY MASS INDEX: 27 KG/M2 | WEIGHT: 168 LBS | OXYGEN SATURATION: 97 % | TEMPERATURE: 98 F | HEIGHT: 66 IN | SYSTOLIC BLOOD PRESSURE: 138 MMHG | DIASTOLIC BLOOD PRESSURE: 80 MMHG | RESPIRATION RATE: 18 BRPM

## 2021-10-26 DIAGNOSIS — R35.1 NOCTURIA: ICD-10-CM

## 2021-10-26 DIAGNOSIS — Z79.4 TYPE 2 DIABETES MELLITUS WITHOUT COMPLICATION, WITH LONG-TERM CURRENT USE OF INSULIN: Primary | ICD-10-CM

## 2021-10-26 DIAGNOSIS — I15.2 HYPERTENSION ASSOCIATED WITH TYPE 2 DIABETES MELLITUS: ICD-10-CM

## 2021-10-26 DIAGNOSIS — N41.1 CHRONIC PROSTATITIS: ICD-10-CM

## 2021-10-26 DIAGNOSIS — M47.812 OSTEOARTHRITIS OF CERVICAL SPINE, UNSPECIFIED SPINAL OSTEOARTHRITIS COMPLICATION STATUS: ICD-10-CM

## 2021-10-26 DIAGNOSIS — E11.59 HYPERTENSION ASSOCIATED WITH TYPE 2 DIABETES MELLITUS: ICD-10-CM

## 2021-10-26 DIAGNOSIS — I10 ESSENTIAL HYPERTENSION: Primary | ICD-10-CM

## 2021-10-26 DIAGNOSIS — Z79.4 TYPE 2 DIABETES MELLITUS WITHOUT COMPLICATION, WITH LONG-TERM CURRENT USE OF INSULIN: ICD-10-CM

## 2021-10-26 DIAGNOSIS — H01.02B SQUAMOUS BLEPHARITIS OF UPPER AND LOWER EYELIDS OF BOTH EYES: ICD-10-CM

## 2021-10-26 DIAGNOSIS — F41.9 ANXIETY: ICD-10-CM

## 2021-10-26 DIAGNOSIS — E04.1 THYROID NODULE: ICD-10-CM

## 2021-10-26 DIAGNOSIS — R20.2 NUMBNESS AND TINGLING OF FOOT: ICD-10-CM

## 2021-10-26 DIAGNOSIS — R20.0 NUMBNESS AND TINGLING OF FOOT: ICD-10-CM

## 2021-10-26 DIAGNOSIS — H01.02A SQUAMOUS BLEPHARITIS OF UPPER AND LOWER EYELIDS OF BOTH EYES: ICD-10-CM

## 2021-10-26 DIAGNOSIS — E11.9 TYPE 2 DIABETES MELLITUS WITHOUT COMPLICATION, WITH LONG-TERM CURRENT USE OF INSULIN: ICD-10-CM

## 2021-10-26 DIAGNOSIS — E11.9 TYPE 2 DIABETES MELLITUS WITHOUT COMPLICATION, WITH LONG-TERM CURRENT USE OF INSULIN: Primary | ICD-10-CM

## 2021-10-26 PROBLEM — E66.9 OBESITY (BMI 30-39.9): Status: RESOLVED | Noted: 2018-02-02 | Resolved: 2021-10-26

## 2021-10-26 LAB
ESTIMATED AVG GLUCOSE: 117 MG/DL (ref 68–131)
HBA1C MFR BLD: 5.7 % (ref 4–5.6)

## 2021-10-26 PROCEDURE — 36415 COLL VENOUS BLD VENIPUNCTURE: CPT | Mod: HCNC,PO | Performed by: INTERNAL MEDICINE

## 2021-10-26 PROCEDURE — 1126F AMNT PAIN NOTED NONE PRSNT: CPT | Mod: HCNC,CPTII,S$GLB, | Performed by: INTERNAL MEDICINE

## 2021-10-26 PROCEDURE — 99999 PR PBB SHADOW E&M-EST. PATIENT-LVL IV: CPT | Mod: PBBFAC,HCNC,, | Performed by: INTERNAL MEDICINE

## 2021-10-26 PROCEDURE — 1101F PT FALLS ASSESS-DOCD LE1/YR: CPT | Mod: HCNC,CPTII,S$GLB, | Performed by: INTERNAL MEDICINE

## 2021-10-26 PROCEDURE — 3075F SYST BP GE 130 - 139MM HG: CPT | Mod: HCNC,CPTII,S$GLB, | Performed by: INTERNAL MEDICINE

## 2021-10-26 PROCEDURE — 3079F DIAST BP 80-89 MM HG: CPT | Mod: HCNC,CPTII,S$GLB, | Performed by: INTERNAL MEDICINE

## 2021-10-26 PROCEDURE — 3288F PR FALLS RISK ASSESSMENT DOCUMENTED: ICD-10-PCS | Mod: HCNC,CPTII,S$GLB, | Performed by: INTERNAL MEDICINE

## 2021-10-26 PROCEDURE — 99214 OFFICE O/P EST MOD 30 MIN: CPT | Mod: HCNC,S$GLB,, | Performed by: INTERNAL MEDICINE

## 2021-10-26 PROCEDURE — 90694 FLU VACCINE - QUADRIVALENT - ADJUVANTED: ICD-10-PCS | Mod: HCNC,S$GLB,, | Performed by: INTERNAL MEDICINE

## 2021-10-26 PROCEDURE — 1101F PR PT FALLS ASSESS DOC 0-1 FALLS W/OUT INJ PAST YR: ICD-10-PCS | Mod: HCNC,CPTII,S$GLB, | Performed by: INTERNAL MEDICINE

## 2021-10-26 PROCEDURE — 1159F MED LIST DOCD IN RCRD: CPT | Mod: HCNC,CPTII,S$GLB, | Performed by: INTERNAL MEDICINE

## 2021-10-26 PROCEDURE — 3079F PR MOST RECENT DIASTOLIC BLOOD PRESSURE 80-89 MM HG: ICD-10-PCS | Mod: HCNC,CPTII,S$GLB, | Performed by: INTERNAL MEDICINE

## 2021-10-26 PROCEDURE — G0008 FLU VACCINE - QUADRIVALENT - ADJUVANTED: ICD-10-PCS | Mod: HCNC,S$GLB,, | Performed by: INTERNAL MEDICINE

## 2021-10-26 PROCEDURE — G0008 ADMIN INFLUENZA VIRUS VAC: HCPCS | Mod: HCNC,S$GLB,, | Performed by: INTERNAL MEDICINE

## 2021-10-26 PROCEDURE — 99499 UNLISTED E&M SERVICE: CPT | Mod: S$GLB,,, | Performed by: INTERNAL MEDICINE

## 2021-10-26 PROCEDURE — 99499 RISK ADDL DX/OHS AUDIT: ICD-10-PCS | Mod: S$GLB,,, | Performed by: INTERNAL MEDICINE

## 2021-10-26 PROCEDURE — 3075F PR MOST RECENT SYSTOLIC BLOOD PRESS GE 130-139MM HG: ICD-10-PCS | Mod: HCNC,CPTII,S$GLB, | Performed by: INTERNAL MEDICINE

## 2021-10-26 PROCEDURE — 83036 HEMOGLOBIN GLYCOSYLATED A1C: CPT | Mod: HCNC | Performed by: INTERNAL MEDICINE

## 2021-10-26 PROCEDURE — 1126F PR PAIN SEVERITY QUANTIFIED, NO PAIN PRESENT: ICD-10-PCS | Mod: HCNC,CPTII,S$GLB, | Performed by: INTERNAL MEDICINE

## 2021-10-26 PROCEDURE — 3288F FALL RISK ASSESSMENT DOCD: CPT | Mod: HCNC,CPTII,S$GLB, | Performed by: INTERNAL MEDICINE

## 2021-10-26 PROCEDURE — 99999 PR PBB SHADOW E&M-EST. PATIENT-LVL IV: ICD-10-PCS | Mod: PBBFAC,HCNC,, | Performed by: INTERNAL MEDICINE

## 2021-10-26 PROCEDURE — 90694 VACC AIIV4 NO PRSRV 0.5ML IM: CPT | Mod: HCNC,S$GLB,, | Performed by: INTERNAL MEDICINE

## 2021-10-26 PROCEDURE — 99214 PR OFFICE/OUTPT VISIT, EST, LEVL IV, 30-39 MIN: ICD-10-PCS | Mod: HCNC,S$GLB,, | Performed by: INTERNAL MEDICINE

## 2021-10-26 PROCEDURE — 1159F PR MEDICATION LIST DOCUMENTED IN MEDICAL RECORD: ICD-10-PCS | Mod: HCNC,CPTII,S$GLB, | Performed by: INTERNAL MEDICINE

## 2021-10-27 ENCOUNTER — TELEPHONE (OUTPATIENT)
Dept: INTERNAL MEDICINE | Facility: CLINIC | Age: 81
End: 2021-10-27
Payer: MEDICARE

## 2021-11-11 ENCOUNTER — IMMUNIZATION (OUTPATIENT)
Dept: INTERNAL MEDICINE | Facility: CLINIC | Age: 81
End: 2021-11-11
Payer: MEDICARE

## 2021-11-11 DIAGNOSIS — Z23 NEED FOR VACCINATION: Primary | ICD-10-CM

## 2021-11-11 PROCEDURE — 0004A COVID-19, MRNA, LNP-S, PF, 30 MCG/0.3 ML DOSE VACCINE: CPT | Mod: HCNC,CV19,PBBFAC | Performed by: INTERNAL MEDICINE

## 2022-01-10 RX ORDER — NABUMETONE 750 MG/1
TABLET, FILM COATED ORAL
Qty: 180 TABLET | Refills: 0 | Status: SHIPPED | OUTPATIENT
Start: 2022-01-10

## 2022-02-16 DIAGNOSIS — F41.9 ANXIETY: ICD-10-CM

## 2022-02-16 NOTE — TELEPHONE ENCOUNTER
No new care gaps identified.  Powered by [employer]. Reference number: [id number].   2/16/2022 5:17:16 PM CST

## 2022-02-17 RX ORDER — ALPRAZOLAM 0.25 MG/1
TABLET ORAL
Qty: 60 TABLET | Refills: 2 | Status: SHIPPED | OUTPATIENT
Start: 2022-02-17 | End: 2023-01-09

## 2022-04-12 ENCOUNTER — LAB VISIT (OUTPATIENT)
Dept: LAB | Facility: HOSPITAL | Age: 82
End: 2022-04-12
Attending: INTERNAL MEDICINE
Payer: MEDICARE

## 2022-04-12 DIAGNOSIS — E11.9 TYPE 2 DIABETES MELLITUS WITHOUT COMPLICATION, WITH LONG-TERM CURRENT USE OF INSULIN: ICD-10-CM

## 2022-04-12 DIAGNOSIS — Z79.4 TYPE 2 DIABETES MELLITUS WITHOUT COMPLICATION, WITH LONG-TERM CURRENT USE OF INSULIN: ICD-10-CM

## 2022-04-12 DIAGNOSIS — I10 ESSENTIAL HYPERTENSION: ICD-10-CM

## 2022-04-12 DIAGNOSIS — R35.1 NOCTURIA: ICD-10-CM

## 2022-04-12 DIAGNOSIS — E04.1 THYROID NODULE: ICD-10-CM

## 2022-04-12 LAB
ALBUMIN SERPL BCP-MCNC: 4.4 G/DL (ref 3.5–5.2)
ALP SERPL-CCNC: 86 U/L (ref 55–135)
ALT SERPL W/O P-5'-P-CCNC: 14 U/L (ref 10–44)
ANION GAP SERPL CALC-SCNC: 14 MMOL/L (ref 8–16)
AST SERPL-CCNC: 28 U/L (ref 10–40)
BASOPHILS # BLD AUTO: 0.05 K/UL (ref 0–0.2)
BASOPHILS NFR BLD: 0.5 % (ref 0–1.9)
BILIRUB SERPL-MCNC: 0.9 MG/DL (ref 0.1–1)
BUN SERPL-MCNC: 18 MG/DL (ref 8–23)
CALCIUM SERPL-MCNC: 10.1 MG/DL (ref 8.7–10.5)
CHLORIDE SERPL-SCNC: 100 MMOL/L (ref 95–110)
CHOLEST SERPL-MCNC: 202 MG/DL (ref 120–199)
CHOLEST/HDLC SERPL: 4 {RATIO} (ref 2–5)
CO2 SERPL-SCNC: 26 MMOL/L (ref 23–29)
COMPLEXED PSA SERPL-MCNC: 2.3 NG/ML (ref 0–4)
CREAT SERPL-MCNC: 0.7 MG/DL (ref 0.5–1.4)
DIFFERENTIAL METHOD: ABNORMAL
EOSINOPHIL # BLD AUTO: 0 K/UL (ref 0–0.5)
EOSINOPHIL NFR BLD: 0.3 % (ref 0–8)
ERYTHROCYTE [DISTWIDTH] IN BLOOD BY AUTOMATED COUNT: 12.1 % (ref 11.5–14.5)
EST. GFR  (AFRICAN AMERICAN): >60 ML/MIN/1.73 M^2
EST. GFR  (NON AFRICAN AMERICAN): >60 ML/MIN/1.73 M^2
ESTIMATED AVG GLUCOSE: 123 MG/DL (ref 68–131)
GLUCOSE SERPL-MCNC: 143 MG/DL (ref 70–110)
HBA1C MFR BLD: 5.9 % (ref 4–5.6)
HCT VFR BLD AUTO: 46.9 % (ref 40–54)
HDLC SERPL-MCNC: 50 MG/DL (ref 40–75)
HDLC SERPL: 24.8 % (ref 20–50)
HGB BLD-MCNC: 15.8 G/DL (ref 14–18)
IMM GRANULOCYTES # BLD AUTO: 0.06 K/UL (ref 0–0.04)
IMM GRANULOCYTES NFR BLD AUTO: 0.6 % (ref 0–0.5)
LDLC SERPL CALC-MCNC: 138.4 MG/DL (ref 63–159)
LYMPHOCYTES # BLD AUTO: 1.4 K/UL (ref 1–4.8)
LYMPHOCYTES NFR BLD: 13.8 % (ref 18–48)
MCH RBC QN AUTO: 33.6 PG (ref 27–31)
MCHC RBC AUTO-ENTMCNC: 33.7 G/DL (ref 32–36)
MCV RBC AUTO: 100 FL (ref 82–98)
MONOCYTES # BLD AUTO: 0.5 K/UL (ref 0.3–1)
MONOCYTES NFR BLD: 5.1 % (ref 4–15)
NEUTROPHILS # BLD AUTO: 7.8 K/UL (ref 1.8–7.7)
NEUTROPHILS NFR BLD: 79.7 % (ref 38–73)
NONHDLC SERPL-MCNC: 152 MG/DL
NRBC BLD-RTO: 0 /100 WBC
PLATELET # BLD AUTO: 205 K/UL (ref 150–450)
PMV BLD AUTO: 10.7 FL (ref 9.2–12.9)
POTASSIUM SERPL-SCNC: 3.7 MMOL/L (ref 3.5–5.1)
PROT SERPL-MCNC: 7.6 G/DL (ref 6–8.4)
RBC # BLD AUTO: 4.7 M/UL (ref 4.6–6.2)
SODIUM SERPL-SCNC: 140 MMOL/L (ref 136–145)
TRIGL SERPL-MCNC: 68 MG/DL (ref 30–150)
TSH SERPL DL<=0.005 MIU/L-ACNC: 1.46 UIU/ML (ref 0.4–4)
WBC # BLD AUTO: 9.76 K/UL (ref 3.9–12.7)

## 2022-04-12 PROCEDURE — 84443 ASSAY THYROID STIM HORMONE: CPT | Performed by: INTERNAL MEDICINE

## 2022-04-12 PROCEDURE — 80061 LIPID PANEL: CPT | Performed by: INTERNAL MEDICINE

## 2022-04-12 PROCEDURE — 83036 HEMOGLOBIN GLYCOSYLATED A1C: CPT | Performed by: INTERNAL MEDICINE

## 2022-04-12 PROCEDURE — 85025 COMPLETE CBC W/AUTO DIFF WBC: CPT | Performed by: INTERNAL MEDICINE

## 2022-04-12 PROCEDURE — 84153 ASSAY OF PSA TOTAL: CPT | Performed by: INTERNAL MEDICINE

## 2022-04-12 PROCEDURE — 80053 COMPREHEN METABOLIC PANEL: CPT | Performed by: INTERNAL MEDICINE

## 2022-04-12 PROCEDURE — 36415 COLL VENOUS BLD VENIPUNCTURE: CPT | Performed by: INTERNAL MEDICINE

## 2022-04-19 ENCOUNTER — OFFICE VISIT (OUTPATIENT)
Dept: INTERNAL MEDICINE | Facility: CLINIC | Age: 82
End: 2022-04-19
Payer: MEDICARE

## 2022-04-19 VITALS
WEIGHT: 173.31 LBS | BODY MASS INDEX: 28.87 KG/M2 | HEART RATE: 93 BPM | OXYGEN SATURATION: 96 % | DIASTOLIC BLOOD PRESSURE: 88 MMHG | HEIGHT: 65 IN | TEMPERATURE: 98 F | RESPIRATION RATE: 18 BRPM | SYSTOLIC BLOOD PRESSURE: 134 MMHG

## 2022-04-19 DIAGNOSIS — Z00.00 ANNUAL PHYSICAL EXAM: Primary | ICD-10-CM

## 2022-04-19 DIAGNOSIS — F41.9 ANXIETY: ICD-10-CM

## 2022-04-19 DIAGNOSIS — Z79.4 TYPE 2 DIABETES MELLITUS WITHOUT COMPLICATION, WITH LONG-TERM CURRENT USE OF INSULIN: Primary | ICD-10-CM

## 2022-04-19 DIAGNOSIS — Z79.4 TYPE 2 DIABETES MELLITUS WITHOUT COMPLICATION, WITH LONG-TERM CURRENT USE OF INSULIN: ICD-10-CM

## 2022-04-19 DIAGNOSIS — I15.2 HYPERTENSION ASSOCIATED WITH TYPE 2 DIABETES MELLITUS: ICD-10-CM

## 2022-04-19 DIAGNOSIS — E11.9 TYPE 2 DIABETES MELLITUS WITHOUT COMPLICATION, WITH LONG-TERM CURRENT USE OF INSULIN: Primary | ICD-10-CM

## 2022-04-19 DIAGNOSIS — E11.9 TYPE 2 DIABETES MELLITUS WITHOUT COMPLICATION, WITH LONG-TERM CURRENT USE OF INSULIN: ICD-10-CM

## 2022-04-19 DIAGNOSIS — M47.812 OSTEOARTHRITIS OF CERVICAL SPINE, UNSPECIFIED SPINAL OSTEOARTHRITIS COMPLICATION STATUS: ICD-10-CM

## 2022-04-19 DIAGNOSIS — E11.59 HYPERTENSION ASSOCIATED WITH TYPE 2 DIABETES MELLITUS: ICD-10-CM

## 2022-04-19 PROCEDURE — 3079F DIAST BP 80-89 MM HG: CPT | Mod: CPTII,S$GLB,, | Performed by: INTERNAL MEDICINE

## 2022-04-19 PROCEDURE — 3075F SYST BP GE 130 - 139MM HG: CPT | Mod: CPTII,S$GLB,, | Performed by: INTERNAL MEDICINE

## 2022-04-19 PROCEDURE — 99999 PR PBB SHADOW E&M-EST. PATIENT-LVL IV: CPT | Mod: PBBFAC,,, | Performed by: INTERNAL MEDICINE

## 2022-04-19 PROCEDURE — 99499 UNLISTED E&M SERVICE: CPT | Mod: S$GLB,,, | Performed by: INTERNAL MEDICINE

## 2022-04-19 PROCEDURE — 1126F PR PAIN SEVERITY QUANTIFIED, NO PAIN PRESENT: ICD-10-PCS | Mod: CPTII,S$GLB,, | Performed by: INTERNAL MEDICINE

## 2022-04-19 PROCEDURE — 99999 PR PBB SHADOW E&M-EST. PATIENT-LVL IV: ICD-10-PCS | Mod: PBBFAC,,, | Performed by: INTERNAL MEDICINE

## 2022-04-19 PROCEDURE — 99397 PER PM REEVAL EST PAT 65+ YR: CPT | Mod: S$GLB,,, | Performed by: INTERNAL MEDICINE

## 2022-04-19 PROCEDURE — 3288F FALL RISK ASSESSMENT DOCD: CPT | Mod: CPTII,S$GLB,, | Performed by: INTERNAL MEDICINE

## 2022-04-19 PROCEDURE — 1159F MED LIST DOCD IN RCRD: CPT | Mod: CPTII,S$GLB,, | Performed by: INTERNAL MEDICINE

## 2022-04-19 PROCEDURE — 3075F PR MOST RECENT SYSTOLIC BLOOD PRESS GE 130-139MM HG: ICD-10-PCS | Mod: CPTII,S$GLB,, | Performed by: INTERNAL MEDICINE

## 2022-04-19 PROCEDURE — 1160F RVW MEDS BY RX/DR IN RCRD: CPT | Mod: CPTII,S$GLB,, | Performed by: INTERNAL MEDICINE

## 2022-04-19 PROCEDURE — 1159F PR MEDICATION LIST DOCUMENTED IN MEDICAL RECORD: ICD-10-PCS | Mod: CPTII,S$GLB,, | Performed by: INTERNAL MEDICINE

## 2022-04-19 PROCEDURE — 3079F PR MOST RECENT DIASTOLIC BLOOD PRESSURE 80-89 MM HG: ICD-10-PCS | Mod: CPTII,S$GLB,, | Performed by: INTERNAL MEDICINE

## 2022-04-19 PROCEDURE — 1101F PT FALLS ASSESS-DOCD LE1/YR: CPT | Mod: CPTII,S$GLB,, | Performed by: INTERNAL MEDICINE

## 2022-04-19 PROCEDURE — 1101F PR PT FALLS ASSESS DOC 0-1 FALLS W/OUT INJ PAST YR: ICD-10-PCS | Mod: CPTII,S$GLB,, | Performed by: INTERNAL MEDICINE

## 2022-04-19 PROCEDURE — 1160F PR REVIEW ALL MEDS BY PRESCRIBER/CLIN PHARMACIST DOCUMENTED: ICD-10-PCS | Mod: CPTII,S$GLB,, | Performed by: INTERNAL MEDICINE

## 2022-04-19 PROCEDURE — 99499 RISK ADDL DX/OHS AUDIT: ICD-10-PCS | Mod: S$GLB,,, | Performed by: INTERNAL MEDICINE

## 2022-04-19 PROCEDURE — 99397 PR PREVENTIVE VISIT,EST,65 & OVER: ICD-10-PCS | Mod: S$GLB,,, | Performed by: INTERNAL MEDICINE

## 2022-04-19 PROCEDURE — 1126F AMNT PAIN NOTED NONE PRSNT: CPT | Mod: CPTII,S$GLB,, | Performed by: INTERNAL MEDICINE

## 2022-04-19 PROCEDURE — 3288F PR FALLS RISK ASSESSMENT DOCUMENTED: ICD-10-PCS | Mod: CPTII,S$GLB,, | Performed by: INTERNAL MEDICINE

## 2022-04-19 NOTE — PROGRESS NOTES
Subjective:       Patient ID: Ajit Ayoub is a 82 y.o. male.    Chief Complaint: Annual Exam    HPI   82 y.o. Male here for annual exam.     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  No date: Obesity (BMI 30-39.9)  3/20/2020: Osteoarthritis of cervical spine  10/26/2021: Type 2 diabetes mellitus without complication, with long-  term current use of insulin  No date: Vitamin D insufficiency  Past Surgical History:  Cataract surgery  12/5/2018: CIRCUMCISION; N/A      Comment:  Procedure: CIRCUMCISION;  Surgeon: Renato Flores MD;                 Location: [address];  Service: Urology;                 Laterality: N/A;  1.5 hour  12/5/2018: CYSTOSCOPY; N/A      Comment:  Procedure: CYSTOSCOPY;  Surgeon: Renato Flores MD;                 Location: [address];  Service: Urology;                 Laterality: N/A;  No date: TRANSURETHRAL RESECTION OF PROSTATE  Social History    Socioeconomic History      Marital status: Single    Occupational History      Occupation: Retired      Tobacco Use      Smoking status: Never Smoker      Smokeless tobacco: Never Used    Substance and Sexual Activity      Alcohol use: No      Drug use: No      Sexual activity: Not Currently        Partners: Female    Review of patient's allergies indicates:   -- Contrast media -- Rash   -- Pneumoc 13-lori conj-dip cr(pf) -- Rash  Ajit Ayoub had no medications administered during this visit.    Review of Systems   Constitutional: Negative for activity change, appetite change, chills, diaphoresis, fatigue, fever and unexpected weight change.   HENT: Negative for nasal congestion, mouth sores, postnasal drip, rhinorrhea,  sinus pressure/congestion, sneezing, sore throat, trouble swallowing and voice change.    Eyes: Negative for discharge, itching and visual disturbance.   Respiratory: Negative for cough, chest tightness, shortness of breath and wheezing.    Cardiovascular: Negative for chest pain, palpitations and leg swelling.   Gastrointestinal: Negative for abdominal pain, blood in stool, constipation, diarrhea, nausea and vomiting.   Endocrine: Negative for cold intolerance and heat intolerance.   Genitourinary: Negative for difficulty urinating, dysuria, flank pain, hematuria and urgency.   Musculoskeletal: Positive for neck pain. Negative for arthralgias and myalgias.   Integumentary:  Negative for rash and wound.   Allergic/Immunologic: Negative for environmental allergies and food allergies.   Neurological: Negative for dizziness, tremors, seizures, syncope, weakness and headaches.   Hematological: Negative for adenopathy. Does not bruise/bleed easily.   Psychiatric/Behavioral: Negative for confusion, self-injury, sleep disturbance and suicidal ideas. The patient is nervous/anxious.          Objective:      Physical Exam  Constitutional:       General: He is not in acute distress.     Appearance: He is well-developed. He is not diaphoretic.   HENT:      Head: Normocephalic and atraumatic.      Right Ear: External ear normal.      Left Ear: External ear normal.      Nose: Nose normal.      Mouth/Throat:      Pharynx: No oropharyngeal exudate.   Eyes:      General: No scleral icterus.        Right eye: No discharge.         Left eye: No discharge.      Conjunctiva/sclera: Conjunctivae normal.      Pupils: Pupils are equal, round, and reactive to light.   Neck:      Thyroid: No thyromegaly.      Vascular: No JVD.   Cardiovascular:      Rate and Rhythm: Normal rate and regular rhythm.      Heart sounds: Normal heart sounds. No murmur heard.  Pulmonary:      Effort: Pulmonary effort is normal. No respiratory distress.      Breath  sounds: Normal breath sounds. No wheezing or rales.   Abdominal:      General: Bowel sounds are normal. There is no distension.      Palpations: Abdomen is soft.      Tenderness: There is no abdominal tenderness. There is no guarding.   Musculoskeletal:      Cervical back: Normal range of motion and neck supple.      Right lower leg: No edema.      Left lower leg: No edema.   Lymphadenopathy:      Cervical: No cervical adenopathy.   Skin:     General: Skin is warm and dry.      Coloration: Skin is not pale.      Findings: No rash.   Neurological:      Mental Status: He is alert and oriented to person, place, and time.   Psychiatric:         Judgment: Judgment normal.         Assessment:       Problem List Items Addressed This Visit        Neuro    Osteoarthritis of cervical spine       Psychiatric    Anxiety       Endocrine    Type 2 diabetes mellitus without complication, with long-term current use of insulin    Hypertension associated with type 2 diabetes mellitus      Other Visit Diagnoses     Annual physical exam    -  Primary          Plan:    Blood work reviewed with pt      T2DM- last A1C of 5.9(4/22)<--6.5(3/21)<--6.0(3/21)      HTN- stable on ARB      Chronic prostatitis- pt has seen Urology       Anxiety- stable on Lexapro 10 mg daily       Chronic neck pain- some relief with PT      Hx of basal cell carcinoma on face- followed by Derm     F/u in 6 months

## 2022-08-17 ENCOUNTER — OFFICE VISIT (OUTPATIENT)
Dept: URGENT CARE | Facility: CLINIC | Age: 82
End: 2022-08-17
Payer: MEDICARE

## 2022-08-17 VITALS
OXYGEN SATURATION: 95 % | SYSTOLIC BLOOD PRESSURE: 128 MMHG | RESPIRATION RATE: 18 BRPM | HEART RATE: 81 BPM | BODY MASS INDEX: 25.99 KG/M2 | HEIGHT: 65 IN | TEMPERATURE: 98 F | WEIGHT: 156 LBS | DIASTOLIC BLOOD PRESSURE: 82 MMHG

## 2022-08-17 DIAGNOSIS — U07.1 COVID-19 VIRUS INFECTION: Primary | ICD-10-CM

## 2022-08-17 DIAGNOSIS — J02.9 SORE THROAT: ICD-10-CM

## 2022-08-17 DIAGNOSIS — U07.1 COVID-19 VIRUS DETECTED: ICD-10-CM

## 2022-08-17 LAB
CTP QC/QA: YES
SARS-COV-2 RDRP RESP QL NAA+PROBE: POSITIVE

## 2022-08-17 PROCEDURE — 1159F MED LIST DOCD IN RCRD: CPT | Mod: CPTII,S$GLB,, | Performed by: NURSE PRACTITIONER

## 2022-08-17 PROCEDURE — 99213 OFFICE O/P EST LOW 20 MIN: CPT | Mod: S$GLB,,, | Performed by: NURSE PRACTITIONER

## 2022-08-17 PROCEDURE — U0002 COVID-19 LAB TEST NON-CDC: HCPCS | Mod: QW,S$GLB,, | Performed by: NURSE PRACTITIONER

## 2022-08-17 PROCEDURE — 1159F PR MEDICATION LIST DOCUMENTED IN MEDICAL RECORD: ICD-10-PCS | Mod: CPTII,S$GLB,, | Performed by: NURSE PRACTITIONER

## 2022-08-17 PROCEDURE — 3079F DIAST BP 80-89 MM HG: CPT | Mod: CPTII,S$GLB,, | Performed by: NURSE PRACTITIONER

## 2022-08-17 PROCEDURE — 1125F PR PAIN SEVERITY QUANTIFIED, PAIN PRESENT: ICD-10-PCS | Mod: CPTII,S$GLB,, | Performed by: NURSE PRACTITIONER

## 2022-08-17 PROCEDURE — 3074F PR MOST RECENT SYSTOLIC BLOOD PRESSURE < 130 MM HG: ICD-10-PCS | Mod: CPTII,S$GLB,, | Performed by: NURSE PRACTITIONER

## 2022-08-17 PROCEDURE — U0002: ICD-10-PCS | Mod: QW,S$GLB,, | Performed by: NURSE PRACTITIONER

## 2022-08-17 PROCEDURE — 99213 PR OFFICE/OUTPT VISIT, EST, LEVL III, 20-29 MIN: ICD-10-PCS | Mod: S$GLB,,, | Performed by: NURSE PRACTITIONER

## 2022-08-17 PROCEDURE — 1160F PR REVIEW ALL MEDS BY PRESCRIBER/CLIN PHARMACIST DOCUMENTED: ICD-10-PCS | Mod: CPTII,S$GLB,, | Performed by: NURSE PRACTITIONER

## 2022-08-17 PROCEDURE — 1125F AMNT PAIN NOTED PAIN PRSNT: CPT | Mod: CPTII,S$GLB,, | Performed by: NURSE PRACTITIONER

## 2022-08-17 PROCEDURE — 3074F SYST BP LT 130 MM HG: CPT | Mod: CPTII,S$GLB,, | Performed by: NURSE PRACTITIONER

## 2022-08-17 PROCEDURE — 1160F RVW MEDS BY RX/DR IN RCRD: CPT | Mod: CPTII,S$GLB,, | Performed by: NURSE PRACTITIONER

## 2022-08-17 PROCEDURE — 3079F PR MOST RECENT DIASTOLIC BLOOD PRESSURE 80-89 MM HG: ICD-10-PCS | Mod: CPTII,S$GLB,, | Performed by: NURSE PRACTITIONER

## 2022-08-17 NOTE — PATIENT INSTRUCTIONS
See additional patient Instructions provided    - Rest.    - Drink plenty of fluids.  - Viral upper respiratory infections typically run their course in 10-14 days.     - Tylenol or Ibuprofen as directed as needed for fever/pain. Avoid tylenol if you have a history of liver disease. Do not take ibuprofen if you have a history of GI bleeding, kidney disease, or if you take blood thinners.     - You can take over-the-counter claritin, zyrtec, allegra, or xyzal as directed. These are antihistamines that can help with runny nose, nasal congestion, sneezing, and helps to dry up post-nasal drip, which usually causes sore throat and cough.   - If you do NOT have high blood pressure, you may use a decongestant form (D)  of this medication (ie. Claritin- D, zyrtec-D, allegra-D) or if you do not take the D form, you can take sudafed (pseudoephedrine) over the counter, which is a decongestant. Do NOT take two decongestant (D) medications at the same time (such as mucinex-D and claritin-D or plain sudafed and claritin D)    - You can use Flonase (fluticasone) nasal spray as directed for sinus congestion and postnasal drip. This is a steroid nasal spray that works locally over time to decrease the inflammation in your nose/sinuses and help with allergic symptoms. This is not an quick- relief spray like afrin, but it works well if used daily.  Discontinue if you develop nose bleed  - use nasal saline prior to Flonase.  - Use Ocean Spray Nasal Saline 1-3 puffs each nostril every 2-3 hours then blow out onto tissue. This is to irrigate the nasal passage way to clear the sinus openings. Use until sinus problem resolved.    - you can take plain Mucinex (guaifenesin) 1200 mg twice a day to help loosen mucous.     -warm salt water gargles can help with sore throat    - warm tea with honey can help with cough. Honey is a natural cough suppressant.    - Dextromethorphan (DM) is a cough suppressant over the counter (ie. mucinex DM,  robitussin, delsym; dayquil/nyquil has DM as well.)    - Follow up with your PCP or specialty clinic as directed in the next 1-2 weeks if not improved or as needed.  You can call [phone] to schedule an appointment with the appropriate provider.      - Go to the ER if you develop new or worsening symptoms.     - You must understand that you have received an Urgent Care treatment only and that you may be released before all of your medical problems are known or treated.   - You, the patient, will arrange for follow up care as instructed.   - If your condition worsens or fails to improve we recommend that you receive another evaluation at the ER immediately or contact your PCP to discuss your concerns or return here.     You have tested positive for COVID-19 today.           ISOLATION    If you tested positive and do not have symptoms, you must isolate for 5 days starting on the day of the positive test.          If you tested positive and have symptoms, you must isolate for 5 days starting on the day of the first symptoms,  not the day of the positive test.       Both the CDC and the [facility] emphasize that you do not test out of isolation.         After 5 days, if your symptoms have improved and you have not had fever on day 5, you can return to the community on day 6- NO TESTING REQUIRED!          In fact, we do not retest if you were positive in the last 90 days.         After your 5 days of isolation are completed, the CDC recommends strict mask use for the first 5 days that you come out of isolation.    Patient Instructions   - You must understand that you have received an Urgent Care treatment only and that you may be released before all of your medical problems are known or treated.   - You, the patient, will arrange for follow up care as instructed.   - If your condition worsens or fails to improve we recommend that you receive another evaluation at the ER immediately or contact your PCP to discuss your  concerns or return here.     Advised on return/follow-up precautions. Advised on ER precautions. Answered all patient questions. Patient verbalized understanding and voiced agreement with current treatment plan.

## 2022-08-17 NOTE — PROGRESS NOTES
".Subjective:       Patient ID: Ajit Ayoub is a 82 y.o. male.    Vitals:  height is 5' 5" (1.651 m) and weight is 70.8 kg (156 lb). His temperature is 97.8 °F (36.6 °C). His blood pressure is 128/82 and his pulse is 81. His respiration is 18 and oxygen saturation is 95%.     Chief Complaint: Sore Throat    This is a 82 y.o. male who presents today with a chief complaint of   Sore throat and cough. Symptom onset 2-3 days ago. Came into contact with COVID 3-4 days ago.     Sore Throat   This is a new problem. The current episode started yesterday. The problem has been unchanged. There has been no fever. The pain is at a severity of 4/10. The pain is mild. Associated symptoms include coughing. Pertinent negatives include no abdominal pain, congestion, diarrhea, ear discharge, ear pain, headaches, neck pain, shortness of breath, trouble swallowing or vomiting. Exposure to: COVID. He has tried nothing for the symptoms.       Constitution: Negative for chills, sweating, fatigue and fever.   HENT: Positive for sore throat. Negative for ear pain, ear discharge, congestion, postnasal drip, sinus pain, sinus pressure, trouble swallowing and voice change.    Neck: Negative for neck pain and painful lymph nodes.   Cardiovascular: Negative for chest pain, palpitations and sob on exertion.   Respiratory: Positive for cough. Negative for chest tightness, sputum production, shortness of breath and wheezing.    Gastrointestinal: Negative for abdominal pain, nausea, vomiting and diarrhea.   Musculoskeletal: Negative for muscle ache.   Skin: Negative for color change, pale and rash.   Allergic/Immunologic: Negative for sneezing.   Neurological: Negative for headaches.   Hematologic/Lymphatic: Negative for swollen lymph nodes.       Objective:      Physical Exam   Constitutional: He is oriented to person, place, and time. He appears well-developed.  Non-toxic appearance. He does not appear ill. No distress.   HENT:   Head: " Normocephalic and atraumatic.   Ears:   Right Ear: Hearing and external ear normal.   Left Ear: Hearing and external ear normal.   Nose: Nose normal. No mucosal edema, rhinorrhea, nasal deformity or congestion. No epistaxis. Right sinus exhibits no maxillary sinus tenderness and no frontal sinus tenderness. Left sinus exhibits no maxillary sinus tenderness and no frontal sinus tenderness.   Mouth/Throat: Uvula is midline and mucous membranes are normal. No trismus in the jaw. Normal dentition. No uvula swelling. No posterior oropharyngeal edema.   Eyes: Conjunctivae and lids are normal. No scleral icterus.   Neck: Trachea normal and phonation normal. Neck supple. No edema present. No erythema present. No neck rigidity present.   Cardiovascular: Normal rate.   Pulmonary/Chest: Effort normal. No respiratory distress. He has no decreased breath sounds.   Abdominal: Normal appearance. He exhibits no distension. flat abdomen   Musculoskeletal: Normal range of motion.         General: No deformity. Normal range of motion.   Neurological: He is alert and oriented to person, place, and time. He exhibits normal muscle tone. Coordination normal.   Skin: Skin is warm, dry, intact, not diaphoretic, not pale and no rash.   Nursing note and vitals reviewed.        covid +    Covid risk score:   6    Assessment:       1. COVID-19 virus infection    2. Sore throat          Plan:         COVID-19 virus infection  -     nirmatrelvir-ritonavir 300 mg (150 mg x 2)-100 mg copackaged tablets (EUA); Take 3 tablets by mouth 2 (two) times daily for 5 days. Each dose contains 2 nirmatrelvir (pink tablets) and 1 ritonavir (white tablet). Take all 3 tablets together  Dispense: 30 tablet; Refill: 0    Sore throat  -     POCT COVID-19 Rapid Screening                 Patient Instructions     See additional patient Instructions provided    - Rest.    - Drink plenty of fluids.  - Viral upper respiratory infections typically run their course in  10-14 days.     - Tylenol or Ibuprofen as directed as needed for fever/pain. Avoid tylenol if you have a history of liver disease. Do not take ibuprofen if you have a history of GI bleeding, kidney disease, or if you take blood thinners.     - You can take over-the-counter claritin, zyrtec, allegra, or xyzal as directed. These are antihistamines that can help with runny nose, nasal congestion, sneezing, and helps to dry up post-nasal drip, which usually causes sore throat and cough.   - If you do NOT have high blood pressure, you may use a decongestant form (D)  of this medication (ie. Claritin- D, zyrtec-D, allegra-D) or if you do not take the D form, you can take sudafed (pseudoephedrine) over the counter, which is a decongestant. Do NOT take two decongestant (D) medications at the same time (such as mucinex-D and claritin-D or plain sudafed and claritin D)    - You can use Flonase (fluticasone) nasal spray as directed for sinus congestion and postnasal drip. This is a steroid nasal spray that works locally over time to decrease the inflammation in your nose/sinuses and help with allergic symptoms. This is not an quick- relief spray like afrin, but it works well if used daily.  Discontinue if you develop nose bleed  - use nasal saline prior to Flonase.  - Use Ocean Spray Nasal Saline 1-3 puffs each nostril every 2-3 hours then blow out onto tissue. This is to irrigate the nasal passage way to clear the sinus openings. Use until sinus problem resolved.    - you can take plain Mucinex (guaifenesin) 1200 mg twice a day to help loosen mucous.     -warm salt water gargles can help with sore throat    - warm tea with honey can help with cough. Honey is a natural cough suppressant.    - Dextromethorphan (DM) is a cough suppressant over the counter (ie. mucinex DM, robitussin, delsym; dayquil/nyquil has DM as well.)    - Follow up with your PCP or specialty clinic as directed in the next 1-2 weeks if not improved or as  needed.  You can call [phone] to schedule an appointment with the appropriate provider.      - Go to the ER if you develop new or worsening symptoms.     - You must understand that you have received an Urgent Care treatment only and that you may be released before all of your medical problems are known or treated.   - You, the patient, will arrange for follow up care as instructed.   - If your condition worsens or fails to improve we recommend that you receive another evaluation at the ER immediately or contact your PCP to discuss your concerns or return here.     You have tested positive for COVID-19 today.           ISOLATION    If you tested positive and do not have symptoms, you must isolate for 5 days starting on the day of the positive test.          If you tested positive and have symptoms, you must isolate for 5 days starting on the day of the first symptoms,  not the day of the positive test.       Both the CDC and the [facility] emphasize that you do not test out of isolation.         After 5 days, if your symptoms have improved and you have not had fever on day 5, you can return to the community on day 6- NO TESTING REQUIRED!          In fact, we do not retest if you were positive in the last 90 days.         After your 5 days of isolation are completed, the CDC recommends strict mask use for the first 5 days that you come out of isolation.    Patient Instructions   - You must understand that you have received an Urgent Care treatment only and that you may be released before all of your medical problems are known or treated.   - You, the patient, will arrange for follow up care as instructed.   - If your condition worsens or fails to improve we recommend that you receive another evaluation at the ER immediately or contact your PCP to discuss your concerns or return here.     Advised on return/follow-up precautions. Advised on ER precautions. Answered all patient questions. Patient verbalized understanding  and voiced agreement with current treatment plan.

## 2022-09-19 DIAGNOSIS — I10 ESSENTIAL HYPERTENSION: ICD-10-CM

## 2022-09-19 NOTE — TELEPHONE ENCOUNTER
----- Message from Mallorie Shaikh sent at 9/19/2022  3:59 PM CDT -----  Contact: [phone] Patient  Requesting an RX refill or new RX.  Is this a refill or new RX: new  RX name and strength (copy/paste from chart):  telmisartan (MICARDIS) 40 MG Tab  Is this a 30 day or 90 day RX:   Pharmacy name and phone # (copy/paste from chart):    [id number] - [place] [address] [facility] [address]  [address]  [facility] [address]  Phone: [phone] Fax: [phone]

## 2022-09-19 NOTE — TELEPHONE ENCOUNTER
No new care gaps identified.  [facility] Embedded Care Gaps. Reference number: [id number]. 9/19/2022   4:44:19 PM CDT

## 2022-09-20 ENCOUNTER — TELEPHONE (OUTPATIENT)
Dept: INTERNAL MEDICINE | Facility: CLINIC | Age: 82
End: 2022-09-20
Payer: MEDICARE

## 2022-09-20 RX ORDER — TELMISARTAN 40 MG/1
40 TABLET ORAL DAILY
Qty: 90 TABLET | Refills: 3 | Status: SHIPPED | OUTPATIENT
Start: 2022-09-20 | End: 2023-09-27

## 2022-09-20 NOTE — TELEPHONE ENCOUNTER
----- Message from Mallorie Shaikh sent at 9/19/2022  3:58 PM CDT -----  Contact: [phone] Patient  Pt is requesting to r/s his 10/19 book out appt. Pt states he needs an appt with Dr Sen before his eye surgery on  11/7/22. Please call and advise.

## 2022-09-20 NOTE — TELEPHONE ENCOUNTER
----- Message from Moriah Huynh sent at 9/20/2022  3:25 PM CDT -----  Contact: pt [phone]  Patient states that he is dropping off paperwork for pre-op Oct-7 - Nov- 5.  Patient is concerned that Dr Sen will out of town.  Patient states it is okay to leave a message on [phone]. Please call and advise.    Thank you and have a great day.

## 2022-10-12 ENCOUNTER — LAB VISIT (OUTPATIENT)
Dept: LAB | Facility: HOSPITAL | Age: 82
End: 2022-10-12
Attending: INTERNAL MEDICINE
Payer: MEDICARE

## 2022-10-12 DIAGNOSIS — Z79.4 TYPE 2 DIABETES MELLITUS WITHOUT COMPLICATION, WITH LONG-TERM CURRENT USE OF INSULIN: ICD-10-CM

## 2022-10-12 DIAGNOSIS — E11.9 TYPE 2 DIABETES MELLITUS WITHOUT COMPLICATION, WITH LONG-TERM CURRENT USE OF INSULIN: ICD-10-CM

## 2022-10-12 DIAGNOSIS — E11.59 HYPERTENSION ASSOCIATED WITH TYPE 2 DIABETES MELLITUS: ICD-10-CM

## 2022-10-12 DIAGNOSIS — I15.2 HYPERTENSION ASSOCIATED WITH TYPE 2 DIABETES MELLITUS: ICD-10-CM

## 2022-10-12 LAB
ALBUMIN SERPL BCP-MCNC: 3.9 G/DL (ref 3.5–5.2)
ALP SERPL-CCNC: 80 U/L (ref 55–135)
ALT SERPL W/O P-5'-P-CCNC: 14 U/L (ref 10–44)
ANION GAP SERPL CALC-SCNC: 10 MMOL/L (ref 8–16)
AST SERPL-CCNC: 21 U/L (ref 10–40)
BASOPHILS # BLD AUTO: 0.05 K/UL (ref 0–0.2)
BASOPHILS NFR BLD: 0.6 % (ref 0–1.9)
BILIRUB SERPL-MCNC: 0.5 MG/DL (ref 0.1–1)
BUN SERPL-MCNC: 23 MG/DL (ref 8–23)
CALCIUM SERPL-MCNC: 10 MG/DL (ref 8.7–10.5)
CHLORIDE SERPL-SCNC: 102 MMOL/L (ref 95–110)
CO2 SERPL-SCNC: 29 MMOL/L (ref 23–29)
CREAT SERPL-MCNC: 0.8 MG/DL (ref 0.5–1.4)
DIFFERENTIAL METHOD: ABNORMAL
EOSINOPHIL # BLD AUTO: 0.2 K/UL (ref 0–0.5)
EOSINOPHIL NFR BLD: 1.7 % (ref 0–8)
ERYTHROCYTE [DISTWIDTH] IN BLOOD BY AUTOMATED COUNT: 13.3 % (ref 11.5–14.5)
EST. GFR  (NO RACE VARIABLE): >60 ML/MIN/1.73 M^2
ESTIMATED AVG GLUCOSE: 114 MG/DL (ref 68–131)
GLUCOSE SERPL-MCNC: 125 MG/DL (ref 70–110)
HBA1C MFR BLD: 5.6 % (ref 4–5.6)
HCT VFR BLD AUTO: 45.4 % (ref 40–54)
HGB BLD-MCNC: 14.5 G/DL (ref 14–18)
IMM GRANULOCYTES # BLD AUTO: 0.02 K/UL (ref 0–0.04)
IMM GRANULOCYTES NFR BLD AUTO: 0.2 % (ref 0–0.5)
LYMPHOCYTES # BLD AUTO: 1.5 K/UL (ref 1–4.8)
LYMPHOCYTES NFR BLD: 16.6 % (ref 18–48)
MCH RBC QN AUTO: 32.1 PG (ref 27–31)
MCHC RBC AUTO-ENTMCNC: 31.9 G/DL (ref 32–36)
MCV RBC AUTO: 100 FL (ref 82–98)
MONOCYTES # BLD AUTO: 0.5 K/UL (ref 0.3–1)
MONOCYTES NFR BLD: 6 % (ref 4–15)
NEUTROPHILS # BLD AUTO: 6.6 K/UL (ref 1.8–7.7)
NEUTROPHILS NFR BLD: 74.9 % (ref 38–73)
NRBC BLD-RTO: 0 /100 WBC
PLATELET # BLD AUTO: 255 K/UL (ref 150–450)
PMV BLD AUTO: 10 FL (ref 9.2–12.9)
POTASSIUM SERPL-SCNC: 4.5 MMOL/L (ref 3.5–5.1)
PROT SERPL-MCNC: 7 G/DL (ref 6–8.4)
RBC # BLD AUTO: 4.52 M/UL (ref 4.6–6.2)
SODIUM SERPL-SCNC: 141 MMOL/L (ref 136–145)
WBC # BLD AUTO: 8.77 K/UL (ref 3.9–12.7)

## 2022-10-12 PROCEDURE — 83036 HEMOGLOBIN GLYCOSYLATED A1C: CPT | Performed by: INTERNAL MEDICINE

## 2022-10-12 PROCEDURE — 36415 COLL VENOUS BLD VENIPUNCTURE: CPT | Mod: PO | Performed by: INTERNAL MEDICINE

## 2022-10-12 PROCEDURE — 80053 COMPREHEN METABOLIC PANEL: CPT | Performed by: INTERNAL MEDICINE

## 2022-10-12 PROCEDURE — 85025 COMPLETE CBC W/AUTO DIFF WBC: CPT | Performed by: INTERNAL MEDICINE

## 2022-10-17 ENCOUNTER — OFFICE VISIT (OUTPATIENT)
Dept: INTERNAL MEDICINE | Facility: CLINIC | Age: 82
End: 2022-10-17
Payer: MEDICARE

## 2022-10-17 VITALS
RESPIRATION RATE: 17 BRPM | OXYGEN SATURATION: 98 % | TEMPERATURE: 98 F | HEIGHT: 66 IN | DIASTOLIC BLOOD PRESSURE: 80 MMHG | HEART RATE: 68 BPM | BODY MASS INDEX: 24.59 KG/M2 | WEIGHT: 153 LBS | SYSTOLIC BLOOD PRESSURE: 122 MMHG

## 2022-10-17 DIAGNOSIS — E11.59 HYPERTENSION ASSOCIATED WITH TYPE 2 DIABETES MELLITUS: ICD-10-CM

## 2022-10-17 DIAGNOSIS — I15.2 HYPERTENSION ASSOCIATED WITH TYPE 2 DIABETES MELLITUS: ICD-10-CM

## 2022-10-17 DIAGNOSIS — E04.1 THYROID NODULE: ICD-10-CM

## 2022-10-17 DIAGNOSIS — E11.9 TYPE 2 DIABETES MELLITUS WITHOUT COMPLICATION, WITH LONG-TERM CURRENT USE OF INSULIN: ICD-10-CM

## 2022-10-17 DIAGNOSIS — M47.812 OSTEOARTHRITIS OF CERVICAL SPINE, UNSPECIFIED SPINAL OSTEOARTHRITIS COMPLICATION STATUS: ICD-10-CM

## 2022-10-17 DIAGNOSIS — Z01.818 PRE-OP EXAM: Primary | ICD-10-CM

## 2022-10-17 DIAGNOSIS — F41.9 ANXIETY: ICD-10-CM

## 2022-10-17 DIAGNOSIS — Z79.4 TYPE 2 DIABETES MELLITUS WITHOUT COMPLICATION, WITH LONG-TERM CURRENT USE OF INSULIN: ICD-10-CM

## 2022-10-17 PROCEDURE — 3074F SYST BP LT 130 MM HG: CPT | Mod: CPTII,S$GLB,, | Performed by: INTERNAL MEDICINE

## 2022-10-17 PROCEDURE — 3079F DIAST BP 80-89 MM HG: CPT | Mod: CPTII,S$GLB,, | Performed by: INTERNAL MEDICINE

## 2022-10-17 PROCEDURE — 1126F PR PAIN SEVERITY QUANTIFIED, NO PAIN PRESENT: ICD-10-PCS | Mod: CPTII,S$GLB,, | Performed by: INTERNAL MEDICINE

## 2022-10-17 PROCEDURE — 99499 UNLISTED E&M SERVICE: CPT | Mod: S$GLB,,, | Performed by: INTERNAL MEDICINE

## 2022-10-17 PROCEDURE — 1126F AMNT PAIN NOTED NONE PRSNT: CPT | Mod: CPTII,S$GLB,, | Performed by: INTERNAL MEDICINE

## 2022-10-17 PROCEDURE — 99214 OFFICE O/P EST MOD 30 MIN: CPT | Mod: S$GLB,,, | Performed by: INTERNAL MEDICINE

## 2022-10-17 PROCEDURE — 93005 EKG 12-LEAD: ICD-10-PCS | Mod: S$GLB,,, | Performed by: INTERNAL MEDICINE

## 2022-10-17 PROCEDURE — 3079F PR MOST RECENT DIASTOLIC BLOOD PRESSURE 80-89 MM HG: ICD-10-PCS | Mod: CPTII,S$GLB,, | Performed by: INTERNAL MEDICINE

## 2022-10-17 PROCEDURE — 99499 RISK ADDL DX/OHS AUDIT: ICD-10-PCS | Mod: S$GLB,,, | Performed by: INTERNAL MEDICINE

## 2022-10-17 PROCEDURE — 93005 ELECTROCARDIOGRAM TRACING: CPT | Mod: S$GLB,,, | Performed by: INTERNAL MEDICINE

## 2022-10-17 PROCEDURE — 93010 EKG 12-LEAD: ICD-10-PCS | Mod: S$GLB,,, | Performed by: INTERNAL MEDICINE

## 2022-10-17 PROCEDURE — 99214 PR OFFICE/OUTPT VISIT, EST, LEVL IV, 30-39 MIN: ICD-10-PCS | Mod: S$GLB,,, | Performed by: INTERNAL MEDICINE

## 2022-10-17 PROCEDURE — 3074F PR MOST RECENT SYSTOLIC BLOOD PRESSURE < 130 MM HG: ICD-10-PCS | Mod: CPTII,S$GLB,, | Performed by: INTERNAL MEDICINE

## 2022-10-17 PROCEDURE — 99999 PR PBB SHADOW E&M-EST. PATIENT-LVL III: CPT | Mod: PBBFAC,,, | Performed by: INTERNAL MEDICINE

## 2022-10-17 PROCEDURE — 93010 ELECTROCARDIOGRAM REPORT: CPT | Mod: S$GLB,,, | Performed by: INTERNAL MEDICINE

## 2022-10-17 PROCEDURE — 99999 PR PBB SHADOW E&M-EST. PATIENT-LVL III: ICD-10-PCS | Mod: PBBFAC,,, | Performed by: INTERNAL MEDICINE

## 2022-10-17 NOTE — PROGRESS NOTES
Subjective:       Patient ID: Ajit Ayoub is a 82 y.o. male.    Chief Complaint: Pre-op Exam    HPI  Pt with T2DM, HTN, Chronic prostatitis, Anxiety, OA cervical spine, Thyroid nodule is here for 6 month f/u.  Pt has right sided cataract surgery scheduled for 11/07/22 by Dr Bullock. Denies any acute complaints today.  Review of Systems   Constitutional:  Negative for activity change, appetite change, chills, diaphoresis, fatigue, fever and unexpected weight change.   HENT:  Negative for postnasal drip, rhinorrhea, sinus pressure/congestion, sneezing, sore throat, trouble swallowing and voice change.    Respiratory:  Negative for cough, shortness of breath and wheezing.    Cardiovascular:  Negative for chest pain, palpitations and leg swelling.   Gastrointestinal:  Negative for abdominal pain, blood in stool, constipation, diarrhea, nausea and vomiting.   Genitourinary:  Negative for dysuria.   Musculoskeletal:  Positive for arthralgias and neck pain. Negative for myalgias.   Integumentary:  Negative for rash and wound.   Allergic/Immunologic: Negative for environmental allergies and food allergies.   Hematological:  Negative for adenopathy. Does not bruise/bleed easily.   Psychiatric/Behavioral:  Negative for self-injury and suicidal ideas. The patient is nervous/anxious.        Objective:      Physical Exam  Constitutional:       General: He is not in acute distress.     Appearance: Normal appearance. He is well-developed. He is not diaphoretic.   HENT:      Head: Normocephalic and atraumatic.      Right Ear: External ear normal.      Left Ear: External ear normal.      Nose: Nose normal.      Mouth/Throat:      Pharynx: No oropharyngeal exudate.   Eyes:      General: No scleral icterus.        Right eye: No discharge.         Left eye: No discharge.      Conjunctiva/sclera: Conjunctivae normal.      Pupils: Pupils are equal, round, and reactive to light.   Neck:      Vascular: No JVD.   Cardiovascular:       Rate and Rhythm: Normal rate and regular rhythm.      Pulses: Normal pulses.      Heart sounds: Normal heart sounds. No murmur heard.  Pulmonary:      Effort: Pulmonary effort is normal. No respiratory distress.      Breath sounds: Normal breath sounds. No wheezing or rales.   Abdominal:      General: Bowel sounds are normal.      Tenderness: There is no abdominal tenderness. There is no guarding or rebound.   Musculoskeletal:      Cervical back: Normal range of motion and neck supple.      Right lower leg: No edema.      Left lower leg: No edema.   Lymphadenopathy:      Cervical: No cervical adenopathy.   Skin:     General: Skin is warm and dry.      Capillary Refill: Capillary refill takes less than 2 seconds.      Coloration: Skin is not pale.      Findings: No rash.   Neurological:      Mental Status: He is alert and oriented to person, place, and time. Mental status is at baseline.      Cranial Nerves: No cranial nerve deficit.   Psychiatric:         Mood and Affect: Mood normal.         Behavior: Behavior normal.       Assessment:       Problem List Items Addressed This Visit          Neuro    Osteoarthritis of cervical spine       Psychiatric    Anxiety       Endocrine    Type 2 diabetes mellitus without complication, with long-term current use of insulin    Thyroid nodule    Relevant Orders    US Soft Tissue Head Neck Thyroid    Hypertension associated with type 2 diabetes mellitus     Other Visit Diagnoses       Pre-op exam    -  Primary    Relevant Orders    EKG 12-lead (Completed)            Plan:    Labs/EKG reviewed and are stable, may proceed with cataract surgery      T2DM- last A1C of 5.6(10/22)<--5.9(4/22)<--6.5(3/21)<--6.0(3/21)      HTN- stable off ARB      Chronic prostatitis- pt has seen Urology       Anxiety- stable on Lexapro 10 mg daily       Chronic neck pain- some relief with PT      Hx of basal cell carcinoma on face- followed by Derm      Thyroid nodule- neg Bx(12/9/19), will repeat  US     F/u in 6 months

## 2022-10-21 ENCOUNTER — TELEPHONE (OUTPATIENT)
Dept: INTERNAL MEDICINE | Facility: CLINIC | Age: 82
End: 2022-10-21
Payer: MEDICARE

## 2022-10-25 ENCOUNTER — TELEPHONE (OUTPATIENT)
Dept: INTERNAL MEDICINE | Facility: CLINIC | Age: 82
End: 2022-10-25
Payer: MEDICARE

## 2022-10-25 NOTE — TELEPHONE ENCOUNTER
----- Message from Celina Allen sent at 10/25/2022  2:54 PM CDT -----  Contact: [phone] Patient  Pt would like a call back regarding the US Dr Sen has ordered for the pt. The pt wants to know if he can do the US sometime after his cataract surgery which is on 11/07/2022? Pt is also asking if Dr Sen sent his clearance to the eye doctor for his cataract surgery?  Please call and advise.

## 2022-10-26 ENCOUNTER — TELEPHONE (OUTPATIENT)
Dept: INTERNAL MEDICINE | Facility: CLINIC | Age: 82
End: 2022-10-26
Payer: MEDICARE

## 2022-10-26 NOTE — TELEPHONE ENCOUNTER
The patient verbalized understood.   The patient advised will schedule appointment for ultrasound around Nov. 8th.

## 2023-02-05 NOTE — TELEPHONE ENCOUNTER
----- Message from Lakisha Mcdonough sent at 12/12/2019  2:17 PM CST -----  Contact: self   Patient state he does not need PT because he is feeling better. Patient state the Rx he received from Dr. Sen is working. Please advise  
Noted; forwarded to Dr. Sen  
PROVIDER:[TOKEN:[[id number]],FOLLOWUP:[1 week],ESTABLISHEDPATIENT:[T]]

## 2023-04-14 ENCOUNTER — TELEPHONE (OUTPATIENT)
Dept: INTERNAL MEDICINE | Facility: CLINIC | Age: 83
End: 2023-04-14
Payer: MEDICARE

## 2023-04-14 DIAGNOSIS — Z00.00 ANNUAL PHYSICAL EXAM: ICD-10-CM

## 2023-04-14 DIAGNOSIS — E11.9 TYPE 2 DIABETES MELLITUS WITHOUT COMPLICATION, WITH LONG-TERM CURRENT USE OF INSULIN: Primary | ICD-10-CM

## 2023-04-14 DIAGNOSIS — Z12.5 SCREENING PSA (PROSTATE SPECIFIC ANTIGEN): ICD-10-CM

## 2023-04-14 DIAGNOSIS — R73.9 ELEVATED BLOOD SUGAR: ICD-10-CM

## 2023-04-14 DIAGNOSIS — Z79.4 TYPE 2 DIABETES MELLITUS WITHOUT COMPLICATION, WITH LONG-TERM CURRENT USE OF INSULIN: Primary | ICD-10-CM

## 2023-04-14 DIAGNOSIS — E11.59 HYPERTENSION ASSOCIATED WITH TYPE 2 DIABETES MELLITUS: ICD-10-CM

## 2023-04-14 DIAGNOSIS — I15.2 HYPERTENSION ASSOCIATED WITH TYPE 2 DIABETES MELLITUS: ICD-10-CM

## 2023-04-14 NOTE — TELEPHONE ENCOUNTER
----- Message from Davina Galvez sent at 4/14/2023  1:03 PM CDT -----  Contact: Self [phone]  type: Lab    Caller is requesting to schedule their Lab appointment prior to annual appointment.  Order is not listed in EPIC.  Please enter order and contact patient to schedule.    Name of Caller: Self     Preferred Date and Time of Labs: 4/18/2023  at 10:00am     Date of Annual Physical Appointment: 4/21/2023    Where would they like the lab performed? [facility]     Would the patient rather a call back or a response via [facility]? Call back    Best Call Back Number: [phone]    Additional Information:

## 2023-04-19 ENCOUNTER — LAB VISIT (OUTPATIENT)
Dept: LAB | Facility: HOSPITAL | Age: 83
End: 2023-04-19
Attending: INTERNAL MEDICINE
Payer: MEDICARE

## 2023-04-19 DIAGNOSIS — R73.9 ELEVATED BLOOD SUGAR: ICD-10-CM

## 2023-04-19 DIAGNOSIS — Z00.00 ANNUAL PHYSICAL EXAM: ICD-10-CM

## 2023-04-19 DIAGNOSIS — E11.59 HYPERTENSION ASSOCIATED WITH TYPE 2 DIABETES MELLITUS: ICD-10-CM

## 2023-04-19 DIAGNOSIS — E11.9 TYPE 2 DIABETES MELLITUS WITHOUT COMPLICATION, WITH LONG-TERM CURRENT USE OF INSULIN: ICD-10-CM

## 2023-04-19 DIAGNOSIS — I15.2 HYPERTENSION ASSOCIATED WITH TYPE 2 DIABETES MELLITUS: ICD-10-CM

## 2023-04-19 DIAGNOSIS — Z79.4 TYPE 2 DIABETES MELLITUS WITHOUT COMPLICATION, WITH LONG-TERM CURRENT USE OF INSULIN: ICD-10-CM

## 2023-04-19 DIAGNOSIS — Z12.5 SCREENING PSA (PROSTATE SPECIFIC ANTIGEN): ICD-10-CM

## 2023-04-19 LAB
ALBUMIN SERPL BCP-MCNC: 4.4 G/DL (ref 3.5–5.2)
ALP SERPL-CCNC: 87 U/L (ref 55–135)
ALT SERPL W/O P-5'-P-CCNC: 17 U/L (ref 10–44)
ANION GAP SERPL CALC-SCNC: 14 MMOL/L (ref 8–16)
AST SERPL-CCNC: 32 U/L (ref 10–40)
BASOPHILS # BLD AUTO: 0.05 K/UL (ref 0–0.2)
BASOPHILS NFR BLD: 0.6 % (ref 0–1.9)
BILIRUB SERPL-MCNC: 0.8 MG/DL (ref 0.1–1)
BUN SERPL-MCNC: 20 MG/DL (ref 8–23)
CALCIUM SERPL-MCNC: 10.2 MG/DL (ref 8.7–10.5)
CHLORIDE SERPL-SCNC: 101 MMOL/L (ref 95–110)
CHOLEST SERPL-MCNC: 204 MG/DL (ref 120–199)
CHOLEST/HDLC SERPL: 4.4 {RATIO} (ref 2–5)
CO2 SERPL-SCNC: 27 MMOL/L (ref 23–29)
COMPLEXED PSA SERPL-MCNC: 1.8 NG/ML (ref 0–4)
CREAT SERPL-MCNC: 0.8 MG/DL (ref 0.5–1.4)
DIFFERENTIAL METHOD: ABNORMAL
EOSINOPHIL # BLD AUTO: 0.1 K/UL (ref 0–0.5)
EOSINOPHIL NFR BLD: 1.2 % (ref 0–8)
ERYTHROCYTE [DISTWIDTH] IN BLOOD BY AUTOMATED COUNT: 12 % (ref 11.5–14.5)
EST. GFR  (NO RACE VARIABLE): >60 ML/MIN/1.73 M^2
ESTIMATED AVG GLUCOSE: 120 MG/DL (ref 68–131)
GLUCOSE SERPL-MCNC: 121 MG/DL (ref 70–110)
HBA1C MFR BLD: 5.8 % (ref 4–5.6)
HCT VFR BLD AUTO: 44.7 % (ref 40–54)
HDLC SERPL-MCNC: 46 MG/DL (ref 40–75)
HDLC SERPL: 22.5 % (ref 20–50)
HGB BLD-MCNC: 15.1 G/DL (ref 14–18)
IMM GRANULOCYTES # BLD AUTO: 0.02 K/UL (ref 0–0.04)
IMM GRANULOCYTES NFR BLD AUTO: 0.2 % (ref 0–0.5)
LDLC SERPL CALC-MCNC: 140.4 MG/DL (ref 63–159)
LYMPHOCYTES # BLD AUTO: 1.9 K/UL (ref 1–4.8)
LYMPHOCYTES NFR BLD: 21.8 % (ref 18–48)
MCH RBC QN AUTO: 33.8 PG (ref 27–31)
MCHC RBC AUTO-ENTMCNC: 33.8 G/DL (ref 32–36)
MCV RBC AUTO: 100 FL (ref 82–98)
MONOCYTES # BLD AUTO: 0.6 K/UL (ref 0.3–1)
MONOCYTES NFR BLD: 6.8 % (ref 4–15)
NEUTROPHILS # BLD AUTO: 5.9 K/UL (ref 1.8–7.7)
NEUTROPHILS NFR BLD: 69.4 % (ref 38–73)
NONHDLC SERPL-MCNC: 158 MG/DL
NRBC BLD-RTO: 0 /100 WBC
PLATELET # BLD AUTO: 200 K/UL (ref 150–450)
PMV BLD AUTO: 10.2 FL (ref 9.2–12.9)
POTASSIUM SERPL-SCNC: 4.2 MMOL/L (ref 3.5–5.1)
PROT SERPL-MCNC: 7.5 G/DL (ref 6–8.4)
RBC # BLD AUTO: 4.47 M/UL (ref 4.6–6.2)
SODIUM SERPL-SCNC: 142 MMOL/L (ref 136–145)
TRIGL SERPL-MCNC: 88 MG/DL (ref 30–150)
TSH SERPL DL<=0.005 MIU/L-ACNC: 2.11 UIU/ML (ref 0.4–4)
WBC # BLD AUTO: 8.53 K/UL (ref 3.9–12.7)

## 2023-04-19 PROCEDURE — 83036 HEMOGLOBIN GLYCOSYLATED A1C: CPT | Mod: HCNC | Performed by: INTERNAL MEDICINE

## 2023-04-19 PROCEDURE — 84443 ASSAY THYROID STIM HORMONE: CPT | Mod: HCNC | Performed by: INTERNAL MEDICINE

## 2023-04-19 PROCEDURE — 85025 COMPLETE CBC W/AUTO DIFF WBC: CPT | Mod: HCNC | Performed by: INTERNAL MEDICINE

## 2023-04-19 PROCEDURE — 36415 COLL VENOUS BLD VENIPUNCTURE: CPT | Mod: HCNC,PO | Performed by: INTERNAL MEDICINE

## 2023-04-19 PROCEDURE — 80061 LIPID PANEL: CPT | Mod: HCNC | Performed by: INTERNAL MEDICINE

## 2023-04-19 PROCEDURE — 84153 ASSAY OF PSA TOTAL: CPT | Mod: HCNC | Performed by: INTERNAL MEDICINE

## 2023-04-19 PROCEDURE — 80053 COMPREHEN METABOLIC PANEL: CPT | Mod: HCNC | Performed by: INTERNAL MEDICINE

## 2023-04-21 ENCOUNTER — OFFICE VISIT (OUTPATIENT)
Dept: INTERNAL MEDICINE | Facility: CLINIC | Age: 83
End: 2023-04-21
Payer: MEDICARE

## 2023-04-21 VITALS
DIASTOLIC BLOOD PRESSURE: 72 MMHG | WEIGHT: 165.81 LBS | BODY MASS INDEX: 26.65 KG/M2 | OXYGEN SATURATION: 98 % | TEMPERATURE: 99 F | HEART RATE: 58 BPM | RESPIRATION RATE: 17 BRPM | HEIGHT: 66 IN | SYSTOLIC BLOOD PRESSURE: 138 MMHG

## 2023-04-21 DIAGNOSIS — F41.9 ANXIETY: ICD-10-CM

## 2023-04-21 DIAGNOSIS — E11.9 TYPE 2 DIABETES MELLITUS WITHOUT COMPLICATION, WITH LONG-TERM CURRENT USE OF INSULIN: ICD-10-CM

## 2023-04-21 DIAGNOSIS — Z00.00 ANNUAL PHYSICAL EXAM: Primary | ICD-10-CM

## 2023-04-21 DIAGNOSIS — I15.2 HYPERTENSION ASSOCIATED WITH TYPE 2 DIABETES MELLITUS: ICD-10-CM

## 2023-04-21 DIAGNOSIS — M47.812 OSTEOARTHRITIS OF CERVICAL SPINE, UNSPECIFIED SPINAL OSTEOARTHRITIS COMPLICATION STATUS: ICD-10-CM

## 2023-04-21 DIAGNOSIS — N20.0 KIDNEY STONE: ICD-10-CM

## 2023-04-21 DIAGNOSIS — N41.1 CHRONIC PROSTATITIS: ICD-10-CM

## 2023-04-21 DIAGNOSIS — E11.59 HYPERTENSION ASSOCIATED WITH TYPE 2 DIABETES MELLITUS: ICD-10-CM

## 2023-04-21 DIAGNOSIS — I10 ESSENTIAL HYPERTENSION: ICD-10-CM

## 2023-04-21 DIAGNOSIS — Z79.4 TYPE 2 DIABETES MELLITUS WITHOUT COMPLICATION, WITH LONG-TERM CURRENT USE OF INSULIN: ICD-10-CM

## 2023-04-21 DIAGNOSIS — E04.1 THYROID NODULE: ICD-10-CM

## 2023-04-21 PROBLEM — N18.1 CKD (CHRONIC KIDNEY DISEASE) STAGE 1, GFR 90 ML/MIN OR GREATER: Status: RESOLVED | Noted: 2018-03-08 | Resolved: 2023-04-21

## 2023-04-21 PROCEDURE — 1160F PR REVIEW ALL MEDS BY PRESCRIBER/CLIN PHARMACIST DOCUMENTED: ICD-10-PCS | Mod: HCNC,CPTII,S$GLB, | Performed by: INTERNAL MEDICINE

## 2023-04-21 PROCEDURE — 1101F PT FALLS ASSESS-DOCD LE1/YR: CPT | Mod: HCNC,CPTII,S$GLB, | Performed by: INTERNAL MEDICINE

## 2023-04-21 PROCEDURE — 3288F PR FALLS RISK ASSESSMENT DOCUMENTED: ICD-10-PCS | Mod: HCNC,CPTII,S$GLB, | Performed by: INTERNAL MEDICINE

## 2023-04-21 PROCEDURE — 99397 PER PM REEVAL EST PAT 65+ YR: CPT | Mod: HCNC,S$GLB,, | Performed by: INTERNAL MEDICINE

## 2023-04-21 PROCEDURE — 1126F AMNT PAIN NOTED NONE PRSNT: CPT | Mod: HCNC,CPTII,S$GLB, | Performed by: INTERNAL MEDICINE

## 2023-04-21 PROCEDURE — 3078F PR MOST RECENT DIASTOLIC BLOOD PRESSURE < 80 MM HG: ICD-10-PCS | Mod: HCNC,CPTII,S$GLB, | Performed by: INTERNAL MEDICINE

## 2023-04-21 PROCEDURE — 3075F SYST BP GE 130 - 139MM HG: CPT | Mod: HCNC,CPTII,S$GLB, | Performed by: INTERNAL MEDICINE

## 2023-04-21 PROCEDURE — 3075F PR MOST RECENT SYSTOLIC BLOOD PRESS GE 130-139MM HG: ICD-10-PCS | Mod: HCNC,CPTII,S$GLB, | Performed by: INTERNAL MEDICINE

## 2023-04-21 PROCEDURE — 3078F DIAST BP <80 MM HG: CPT | Mod: HCNC,CPTII,S$GLB, | Performed by: INTERNAL MEDICINE

## 2023-04-21 PROCEDURE — 1126F PR PAIN SEVERITY QUANTIFIED, NO PAIN PRESENT: ICD-10-PCS | Mod: HCNC,CPTII,S$GLB, | Performed by: INTERNAL MEDICINE

## 2023-04-21 PROCEDURE — 1159F PR MEDICATION LIST DOCUMENTED IN MEDICAL RECORD: ICD-10-PCS | Mod: HCNC,CPTII,S$GLB, | Performed by: INTERNAL MEDICINE

## 2023-04-21 PROCEDURE — 99999 PR PBB SHADOW E&M-EST. PATIENT-LVL IV: ICD-10-PCS | Mod: PBBFAC,HCNC,, | Performed by: INTERNAL MEDICINE

## 2023-04-21 PROCEDURE — 1159F MED LIST DOCD IN RCRD: CPT | Mod: HCNC,CPTII,S$GLB, | Performed by: INTERNAL MEDICINE

## 2023-04-21 PROCEDURE — 1160F RVW MEDS BY RX/DR IN RCRD: CPT | Mod: HCNC,CPTII,S$GLB, | Performed by: INTERNAL MEDICINE

## 2023-04-21 PROCEDURE — 99397 PR PREVENTIVE VISIT,EST,65 & OVER: ICD-10-PCS | Mod: HCNC,S$GLB,, | Performed by: INTERNAL MEDICINE

## 2023-04-21 PROCEDURE — 3288F FALL RISK ASSESSMENT DOCD: CPT | Mod: HCNC,CPTII,S$GLB, | Performed by: INTERNAL MEDICINE

## 2023-04-21 PROCEDURE — 1101F PR PT FALLS ASSESS DOC 0-1 FALLS W/OUT INJ PAST YR: ICD-10-PCS | Mod: HCNC,CPTII,S$GLB, | Performed by: INTERNAL MEDICINE

## 2023-04-21 PROCEDURE — 99999 PR PBB SHADOW E&M-EST. PATIENT-LVL IV: CPT | Mod: PBBFAC,HCNC,, | Performed by: INTERNAL MEDICINE

## 2023-04-21 RX ORDER — LATANOPROST 50 UG/ML
1 SOLUTION/ DROPS OPHTHALMIC NIGHTLY
COMMUNITY
Start: 2023-03-14

## 2023-04-21 RX ORDER — ESCITALOPRAM OXALATE 10 MG/1
TABLET ORAL
Qty: 90 TABLET | Refills: 3 | Status: SHIPPED | OUTPATIENT
Start: 2023-04-21

## 2023-04-21 NOTE — PROGRESS NOTES
Subjective     Patient ID: Ajit Ayoub is a 83 y.o. male.    Chief Complaint: Annual Exam    HPI  83 y.o. Male here for annual exam.      Vaccines: Influenza (2020); Tetanus (2014); Pneumovax (current); Shingrix (2020)  Eye exam: current  Colonoscopy: 2011     Exercise: no  Diet: regular     Past Medical History:  No date: Cataract  No date: Chronic prostatitis  No date: Glaucoma  No date: Hypertension  No date: Nephrolithiasis   No date: Hyperthyroidism      Comment:  pt states he has a fatty tumor  No date: Obesity (BMI 30-39.9)  3/20/2020: Osteoarthritis of cervical spine  10/26/2021: Type 2 diabetes mellitus without complication, with long-  term current use of insulin  No date: Vitamin D insufficiency  Past Surgical History:  Cataract surgery  12/5/2018: CIRCUMCISION; N/A      Comment:  Procedure: CIRCUMCISION;  Surgeon: Renato Flores MD;                 Location: [address];  Service: Urology;                 Laterality: N/A;  1.5 hour  12/5/2018: CYSTOSCOPY; N/A      Comment:  Procedure: CYSTOSCOPY;  Surgeon: Renato Flores MD;                 Location: [address];  Service: Urology;                 Laterality: N/A;  No date: TRANSURETHRAL RESECTION OF PROSTATE  Social History    Socioeconomic History      Marital status: Single    Occupational History      Occupation: Retired      Tobacco Use      Smoking status: Never Smoker      Smokeless tobacco: Never Used    Substance and Sexual Activity      Alcohol use: No      Drug use: No      Sexual activity: Not Currently        Partners: Female     Review of patient's allergies indicates:   -- Contrast media -- Rash   -- Pneumoc 13-lori conj-dip cr(pf) -- Rash  Review of Systems   Constitutional:  Negative for activity change, appetite change, chills, diaphoresis, fatigue, fever and unexpected weight change.   HENT:  Negative for nasal congestion, mouth sores, postnasal drip, rhinorrhea, sinus pressure/congestion, sneezing, sore  throat, trouble swallowing and voice change.    Eyes:  Negative for discharge, itching and visual disturbance.   Respiratory:  Negative for cough, chest tightness, shortness of breath and wheezing.    Cardiovascular:  Negative for chest pain, palpitations and leg swelling.   Gastrointestinal:  Negative for abdominal pain, blood in stool, constipation, diarrhea, nausea and vomiting.   Endocrine: Negative for cold intolerance and heat intolerance.   Genitourinary:  Negative for difficulty urinating, dysuria, flank pain, hematuria and urgency.   Musculoskeletal:  Positive for arthralgias. Negative for back pain, myalgias and neck pain.   Integumentary:  Negative for rash and wound.   Allergic/Immunologic: Negative for environmental allergies and food allergies.   Neurological:  Negative for dizziness, tremors, seizures, syncope, weakness and headaches.   Hematological:  Negative for adenopathy. Does not bruise/bleed easily.   Psychiatric/Behavioral:  Negative for confusion, self-injury, sleep disturbance and suicidal ideas. The patient is nervous/anxious.         Objective     Physical Exam  Constitutional:       General: He is not in acute distress.     Appearance: Normal appearance. He is well-developed. He is not ill-appearing, toxic-appearing or diaphoretic.   HENT:      Head: Normocephalic and atraumatic.      Right Ear: External ear normal.      Left Ear: External ear normal.      Nose: Nose normal.      Mouth/Throat:      Pharynx: No oropharyngeal exudate.   Eyes:      General: No scleral icterus.        Right eye: No discharge.         Left eye: No discharge.      Extraocular Movements: Extraocular movements intact.      Conjunctiva/sclera: Conjunctivae normal.      Pupils: Pupils are equal, round, and reactive to light.   Neck:      Thyroid: No thyromegaly.      Vascular: No JVD.   Cardiovascular:      Rate and Rhythm: Normal rate and regular rhythm.      Pulses: Normal pulses.      Heart sounds: Normal heart  sounds. No murmur heard.  Pulmonary:      Effort: Pulmonary effort is normal. No respiratory distress.      Breath sounds: Normal breath sounds. No wheezing or rales.   Abdominal:      General: Bowel sounds are normal. There is no distension.      Palpations: Abdomen is soft.      Tenderness: There is no abdominal tenderness. There is no right CVA tenderness, left CVA tenderness, guarding or rebound.   Musculoskeletal:      Cervical back: Normal range of motion and neck supple. No rigidity.      Right lower leg: No edema.      Left lower leg: No edema.   Lymphadenopathy:      Cervical: No cervical adenopathy.   Skin:     General: Skin is warm and dry.      Capillary Refill: Capillary refill takes less than 2 seconds.      Coloration: Skin is not pale.      Findings: No rash.   Neurological:      General: No focal deficit present.      Mental Status: He is alert and oriented to person, place, and time. Mental status is at baseline.      Cranial Nerves: No cranial nerve deficit.      Sensory: No sensory deficit.      Motor: No weakness.      Coordination: Coordination normal.      Gait: Gait normal.      Deep Tendon Reflexes: Reflexes normal.   Psychiatric:         Mood and Affect: Mood normal.         Behavior: Behavior normal.         Thought Content: Thought content normal.         Judgment: Judgment normal.          Assessment and Plan     Problem List Items Addressed This Visit          Neuro    Osteoarthritis of cervical spine       Psychiatric    Anxiety    Relevant Medications    EScitalopram oxalate (LEXAPRO) 10 MG tablet       Renal/    Kidney stone    Chronic prostatitis       Endocrine    Type 2 diabetes mellitus without complication, with long-term current use of insulin    Thyroid nodule    Hypertension associated with type 2 diabetes mellitus     Other Visit Diagnoses       Annual physical exam    -  Primary    Essential hypertension             Blood work reviewed with pt       T2DM- last A1C of  5.8(4/23)<--5.6(10/22)<--5.9(4/22)<--6.5(3/21)<--6.0(3/21)      HTN- stable on Micardis       Chronic prostatitis- pt has seen Urology       Anxiety- restart on Lexapro 10 mg daily       Chronic neck pain- some relief with PT      Hx of basal cell carcinoma on face- followed by Derm      Thyroid nodule- neg Bx(12/9/19), will repeat US      hx of nephrolithiasis- stable      F/u in 6 months

## 2023-06-01 ENCOUNTER — TELEPHONE (OUTPATIENT)
Dept: INTERNAL MEDICINE | Facility: CLINIC | Age: 83
End: 2023-06-01
Payer: MEDICARE

## 2023-06-01 RX ORDER — ALPRAZOLAM 0.5 MG/1
0.5 TABLET ORAL 2 TIMES DAILY PRN
Qty: 60 TABLET | Refills: 1 | Status: SHIPPED | OUTPATIENT
Start: 2023-06-01 | End: 2023-10-20 | Stop reason: SDUPTHER

## 2023-06-01 NOTE — TELEPHONE ENCOUNTER
----- Message from Lora Mcdonough sent at 6/1/2023 12:51 PM CDT -----  Contact: Pt [phone]  Requesting an RX refill or new RX.  Is this a refill or new RX: refill   RX name and strength (copy/paste from chart):  ALPRAZolam (XANAX) 0.25 MG tablet  Is this a 30 day or 90 day RX:   Pharmacy name and phone # (copy/paste from chart):  [id number] [place] [address] [address] [facility] [address]   Phone:  [phone]  Fax:  [phone]    The doctors have asked that we provide their patients with the following 2 reminders -- prescription refills can take up to 72 hours, and a friendly reminder that in the future you can use your [facility] account to request refills: yes     Pt states when he saw Dr Sen last he was supposed to up the dosage

## 2023-09-27 DIAGNOSIS — I10 ESSENTIAL HYPERTENSION: ICD-10-CM

## 2023-09-27 RX ORDER — TELMISARTAN 40 MG/1
40 TABLET ORAL
Qty: 90 TABLET | Refills: 1 | Status: SHIPPED | OUTPATIENT
Start: 2023-09-27

## 2023-09-27 NOTE — TELEPHONE ENCOUNTER
No care due was identified.  [facility] Embedded Care Due Messages. Reference number: [id number].   9/27/2023 4:15:25 AM CDT

## 2023-09-27 NOTE — TELEPHONE ENCOUNTER
Refill Decision Note   Ajit Ayoub  is requesting a refill authorization.  Brief Assessment and Rationale for Refill:  Approve     Medication Therapy Plan:         Comments:     Note composed:8:26 AM 09/27/2023

## 2023-10-20 RX ORDER — ALPRAZOLAM 0.5 MG/1
0.5 TABLET ORAL 2 TIMES DAILY PRN
Qty: 60 TABLET | Refills: 1 | Status: SHIPPED | OUTPATIENT
Start: 2023-10-20

## 2023-10-20 NOTE — TELEPHONE ENCOUNTER
----- Message from Ruth Suh sent at 10/20/2023  2:34 PM CDT -----  Contact: [phone] @ patient  Requesting an RX refill or new RX.ALPRAZolam (XANAX) 0.5 MG tablet  Is this a refill or new RX: refill  RX name and strength (copy/paste from chart):    Is this a 30 day or 90 day RX:   [facility] and phone # [id number] [address] [facility] [address]  The doctors have asked that we provide their patients with the following 2 reminders -- prescription refills can take up to 72 hours, and a friendly reminder that in the future you can use your [facility] account to request refills:

## 2023-10-20 NOTE — TELEPHONE ENCOUNTER
No care due was identified.  [facility] Embedded Care Due Messages. Reference number: [id number].   10/20/2023 2:42:21 PM CDT

## 2023-12-07 ENCOUNTER — OFFICE VISIT (OUTPATIENT)
Dept: INTERNAL MEDICINE | Facility: CLINIC | Age: 83
End: 2023-12-07
Payer: MEDICARE

## 2023-12-07 ENCOUNTER — LAB VISIT (OUTPATIENT)
Dept: LAB | Facility: HOSPITAL | Age: 83
End: 2023-12-07
Attending: INTERNAL MEDICINE
Payer: MEDICARE

## 2023-12-07 VITALS
TEMPERATURE: 97 F | HEIGHT: 65 IN | HEART RATE: 75 BPM | SYSTOLIC BLOOD PRESSURE: 138 MMHG | BODY MASS INDEX: 28.36 KG/M2 | DIASTOLIC BLOOD PRESSURE: 88 MMHG | OXYGEN SATURATION: 96 % | WEIGHT: 170.19 LBS | RESPIRATION RATE: 17 BRPM

## 2023-12-07 DIAGNOSIS — E11.59 HYPERTENSION ASSOCIATED WITH TYPE 2 DIABETES MELLITUS: ICD-10-CM

## 2023-12-07 DIAGNOSIS — I15.2 HYPERTENSION ASSOCIATED WITH TYPE 2 DIABETES MELLITUS: ICD-10-CM

## 2023-12-07 DIAGNOSIS — N20.0 KIDNEY STONE: ICD-10-CM

## 2023-12-07 DIAGNOSIS — M47.812 OSTEOARTHRITIS OF CERVICAL SPINE, UNSPECIFIED SPINAL OSTEOARTHRITIS COMPLICATION STATUS: ICD-10-CM

## 2023-12-07 DIAGNOSIS — N41.1 CHRONIC PROSTATITIS: ICD-10-CM

## 2023-12-07 DIAGNOSIS — G89.29 CHRONIC BILATERAL BACK PAIN, UNSPECIFIED BACK LOCATION: ICD-10-CM

## 2023-12-07 DIAGNOSIS — F41.9 ANXIETY: Primary | ICD-10-CM

## 2023-12-07 DIAGNOSIS — Z23 NEED FOR VACCINATION: ICD-10-CM

## 2023-12-07 DIAGNOSIS — M54.9 CHRONIC BILATERAL BACK PAIN, UNSPECIFIED BACK LOCATION: ICD-10-CM

## 2023-12-07 DIAGNOSIS — E11.9 TYPE 2 DIABETES MELLITUS WITHOUT COMPLICATION, WITH LONG-TERM CURRENT USE OF INSULIN: ICD-10-CM

## 2023-12-07 DIAGNOSIS — E04.1 THYROID NODULE: ICD-10-CM

## 2023-12-07 DIAGNOSIS — Z79.4 TYPE 2 DIABETES MELLITUS WITHOUT COMPLICATION, WITH LONG-TERM CURRENT USE OF INSULIN: ICD-10-CM

## 2023-12-07 LAB
ALBUMIN SERPL BCP-MCNC: 4.1 G/DL (ref 3.5–5.2)
ALP SERPL-CCNC: 86 U/L (ref 55–135)
ALT SERPL W/O P-5'-P-CCNC: 16 U/L (ref 10–44)
ANION GAP SERPL CALC-SCNC: 11 MMOL/L (ref 8–16)
AST SERPL-CCNC: 27 U/L (ref 10–40)
BASOPHILS # BLD AUTO: 0.03 K/UL (ref 0–0.2)
BASOPHILS NFR BLD: 0.3 % (ref 0–1.9)
BILIRUB SERPL-MCNC: 0.4 MG/DL (ref 0.1–1)
BUN SERPL-MCNC: 16 MG/DL (ref 8–23)
CALCIUM SERPL-MCNC: 9.6 MG/DL (ref 8.7–10.5)
CHLORIDE SERPL-SCNC: 102 MMOL/L (ref 95–110)
CO2 SERPL-SCNC: 29 MMOL/L (ref 23–29)
CREAT SERPL-MCNC: 0.8 MG/DL (ref 0.5–1.4)
DIFFERENTIAL METHOD: ABNORMAL
EOSINOPHIL # BLD AUTO: 0.1 K/UL (ref 0–0.5)
EOSINOPHIL NFR BLD: 1.3 % (ref 0–8)
ERYTHROCYTE [DISTWIDTH] IN BLOOD BY AUTOMATED COUNT: 11.9 % (ref 11.5–14.5)
EST. GFR  (NO RACE VARIABLE): >60 ML/MIN/1.73 M^2
ESTIMATED AVG GLUCOSE: 117 MG/DL (ref 68–131)
GLUCOSE SERPL-MCNC: 120 MG/DL (ref 70–110)
HBA1C MFR BLD: 5.7 % (ref 4–5.6)
HCT VFR BLD AUTO: 42.9 % (ref 40–54)
HGB BLD-MCNC: 14.7 G/DL (ref 14–18)
IMM GRANULOCYTES # BLD AUTO: 0.03 K/UL (ref 0–0.04)
IMM GRANULOCYTES NFR BLD AUTO: 0.3 % (ref 0–0.5)
LYMPHOCYTES # BLD AUTO: 1.5 K/UL (ref 1–4.8)
LYMPHOCYTES NFR BLD: 17 % (ref 18–48)
MCH RBC QN AUTO: 33.6 PG (ref 27–31)
MCHC RBC AUTO-ENTMCNC: 34.3 G/DL (ref 32–36)
MCV RBC AUTO: 98 FL (ref 82–98)
MONOCYTES # BLD AUTO: 0.6 K/UL (ref 0.3–1)
MONOCYTES NFR BLD: 6.4 % (ref 4–15)
NEUTROPHILS # BLD AUTO: 6.5 K/UL (ref 1.8–7.7)
NEUTROPHILS NFR BLD: 74.7 % (ref 38–73)
NRBC BLD-RTO: 0 /100 WBC
PLATELET # BLD AUTO: 205 K/UL (ref 150–450)
PMV BLD AUTO: 10.7 FL (ref 9.2–12.9)
POTASSIUM SERPL-SCNC: 3.8 MMOL/L (ref 3.5–5.1)
PROT SERPL-MCNC: 7.2 G/DL (ref 6–8.4)
RBC # BLD AUTO: 4.38 M/UL (ref 4.6–6.2)
SODIUM SERPL-SCNC: 142 MMOL/L (ref 136–145)
WBC # BLD AUTO: 8.76 K/UL (ref 3.9–12.7)

## 2023-12-07 PROCEDURE — 3075F PR MOST RECENT SYSTOLIC BLOOD PRESS GE 130-139MM HG: ICD-10-PCS | Mod: HCNC,CPTII,S$GLB, | Performed by: INTERNAL MEDICINE

## 2023-12-07 PROCEDURE — 3288F FALL RISK ASSESSMENT DOCD: CPT | Mod: HCNC,CPTII,S$GLB, | Performed by: INTERNAL MEDICINE

## 2023-12-07 PROCEDURE — 99999 PR PBB SHADOW E&M-EST. PATIENT-LVL IV: CPT | Mod: PBBFAC,HCNC,, | Performed by: INTERNAL MEDICINE

## 2023-12-07 PROCEDURE — 99999 PR PBB SHADOW E&M-EST. PATIENT-LVL IV: ICD-10-PCS | Mod: PBBFAC,HCNC,, | Performed by: INTERNAL MEDICINE

## 2023-12-07 PROCEDURE — 80053 COMPREHEN METABOLIC PANEL: CPT | Mod: HCNC | Performed by: INTERNAL MEDICINE

## 2023-12-07 PROCEDURE — 1159F PR MEDICATION LIST DOCUMENTED IN MEDICAL RECORD: ICD-10-PCS | Mod: HCNC,CPTII,S$GLB, | Performed by: INTERNAL MEDICINE

## 2023-12-07 PROCEDURE — 1101F PR PT FALLS ASSESS DOC 0-1 FALLS W/OUT INJ PAST YR: ICD-10-PCS | Mod: HCNC,CPTII,S$GLB, | Performed by: INTERNAL MEDICINE

## 2023-12-07 PROCEDURE — 3079F PR MOST RECENT DIASTOLIC BLOOD PRESSURE 80-89 MM HG: ICD-10-PCS | Mod: HCNC,CPTII,S$GLB, | Performed by: INTERNAL MEDICINE

## 2023-12-07 PROCEDURE — 99214 OFFICE O/P EST MOD 30 MIN: CPT | Mod: HCNC,S$GLB,, | Performed by: INTERNAL MEDICINE

## 2023-12-07 PROCEDURE — 3075F SYST BP GE 130 - 139MM HG: CPT | Mod: HCNC,CPTII,S$GLB, | Performed by: INTERNAL MEDICINE

## 2023-12-07 PROCEDURE — 1126F PR PAIN SEVERITY QUANTIFIED, NO PAIN PRESENT: ICD-10-PCS | Mod: HCNC,CPTII,S$GLB, | Performed by: INTERNAL MEDICINE

## 2023-12-07 PROCEDURE — 1101F PT FALLS ASSESS-DOCD LE1/YR: CPT | Mod: HCNC,CPTII,S$GLB, | Performed by: INTERNAL MEDICINE

## 2023-12-07 PROCEDURE — 36415 COLL VENOUS BLD VENIPUNCTURE: CPT | Mod: HCNC,PO | Performed by: INTERNAL MEDICINE

## 2023-12-07 PROCEDURE — 3079F DIAST BP 80-89 MM HG: CPT | Mod: HCNC,CPTII,S$GLB, | Performed by: INTERNAL MEDICINE

## 2023-12-07 PROCEDURE — 83036 HEMOGLOBIN GLYCOSYLATED A1C: CPT | Mod: HCNC | Performed by: INTERNAL MEDICINE

## 2023-12-07 PROCEDURE — G0008 ADMIN INFLUENZA VIRUS VAC: HCPCS | Mod: HCNC,S$GLB,, | Performed by: INTERNAL MEDICINE

## 2023-12-07 PROCEDURE — 85025 COMPLETE CBC W/AUTO DIFF WBC: CPT | Mod: HCNC | Performed by: INTERNAL MEDICINE

## 2023-12-07 PROCEDURE — 1126F AMNT PAIN NOTED NONE PRSNT: CPT | Mod: HCNC,CPTII,S$GLB, | Performed by: INTERNAL MEDICINE

## 2023-12-07 PROCEDURE — G0008 FLU VACCINE - QUADRIVALENT - ADJUVANTED: ICD-10-PCS | Mod: HCNC,S$GLB,, | Performed by: INTERNAL MEDICINE

## 2023-12-07 PROCEDURE — 90694 VACC AIIV4 NO PRSRV 0.5ML IM: CPT | Mod: HCNC,S$GLB,, | Performed by: INTERNAL MEDICINE

## 2023-12-07 PROCEDURE — 3288F PR FALLS RISK ASSESSMENT DOCUMENTED: ICD-10-PCS | Mod: HCNC,CPTII,S$GLB, | Performed by: INTERNAL MEDICINE

## 2023-12-07 PROCEDURE — 99214 PR OFFICE/OUTPT VISIT, EST, LEVL IV, 30-39 MIN: ICD-10-PCS | Mod: HCNC,S$GLB,, | Performed by: INTERNAL MEDICINE

## 2023-12-07 PROCEDURE — 90694 FLU VACCINE - QUADRIVALENT - ADJUVANTED: ICD-10-PCS | Mod: HCNC,S$GLB,, | Performed by: INTERNAL MEDICINE

## 2023-12-07 PROCEDURE — 1159F MED LIST DOCD IN RCRD: CPT | Mod: HCNC,CPTII,S$GLB, | Performed by: INTERNAL MEDICINE

## 2023-12-07 NOTE — PROGRESS NOTES
Subjective     Patient ID: Ajit Ayoub is a 83 y.o. male.    Chief Complaint: Follow-up (6 Months follow up )    HPI  Pt with T2DM, HTN, Chronic prostatitis, Anxiety, OA cervical spine, Thyroid nodule is here for 6 month f/u. Main issue is recurrent neck and back pain.   Review of Systems   Constitutional:  Negative for activity change, appetite change, chills, diaphoresis, fatigue, fever and unexpected weight change.   HENT:  Negative for postnasal drip, rhinorrhea, sinus pressure/congestion, sneezing, sore throat, trouble swallowing and voice change.    Respiratory:  Negative for cough, shortness of breath and wheezing.    Cardiovascular:  Negative for chest pain, palpitations and leg swelling.   Gastrointestinal:  Negative for abdominal pain, blood in stool, constipation, diarrhea, nausea and vomiting.   Genitourinary:  Negative for dysuria.   Musculoskeletal:  Positive for arthralgias, back pain, neck pain and neck stiffness. Negative for myalgias.   Integumentary:  Negative for rash and wound.   Allergic/Immunologic: Negative for environmental allergies and food allergies.   Hematological:  Negative for adenopathy. Does not bruise/bleed easily.   Psychiatric/Behavioral:  Negative for self-injury and suicidal ideas. The patient is nervous/anxious.           Objective     Physical Exam  Constitutional:       General: He is not in acute distress.     Appearance: Normal appearance. He is well-developed. He is not diaphoretic.   HENT:      Head: Normocephalic and atraumatic.      Right Ear: External ear normal.      Left Ear: External ear normal.      Nose: Nose normal.      Mouth/Throat:      Pharynx: No oropharyngeal exudate.   Eyes:      General: No scleral icterus.        Right eye: No discharge.         Left eye: No discharge.      Conjunctiva/sclera: Conjunctivae normal.      Pupils: Pupils are equal, round, and reactive to light.   Neck:      Vascular: No JVD.   Cardiovascular:      Rate and Rhythm:  Normal rate and regular rhythm.      Pulses: Normal pulses.      Heart sounds: Normal heart sounds. No murmur heard.  Pulmonary:      Effort: Pulmonary effort is normal. No respiratory distress.      Breath sounds: Normal breath sounds. No wheezing or rales.   Abdominal:      General: Bowel sounds are normal.      Tenderness: There is no abdominal tenderness. There is no guarding or rebound.   Musculoskeletal:      Cervical back: Normal range of motion and neck supple. Tenderness present.      Thoracic back: Tenderness present.      Lumbar back: Tenderness present.      Right lower leg: No edema.      Left lower leg: No edema.   Lymphadenopathy:      Cervical: No cervical adenopathy.   Skin:     General: Skin is warm and dry.      Capillary Refill: Capillary refill takes less than 2 seconds.      Coloration: Skin is not pale.      Findings: No rash.   Neurological:      Mental Status: He is alert and oriented to person, place, and time. Mental status is at baseline.      Cranial Nerves: No cranial nerve deficit.   Psychiatric:         Mood and Affect: Mood normal.         Behavior: Behavior normal.            Assessment and Plan     1. Anxiety    2. Hypertension associated with type 2 diabetes mellitus  -     CBC Auto Differential; Future; Expected date: 12/07/2023  -     Comprehensive Metabolic Panel; Future; Expected date: 12/07/2023  -     Hemoglobin A1C; Future; Expected date: 12/07/2023    3. Kidney stone    4. Thyroid nodule    5. Type 2 diabetes mellitus without complication, with long-term current use of insulin    6. Chronic prostatitis    7. Osteoarthritis of cervical spine, unspecified spinal osteoarthritis complication status  -     Ambulatory referral/consult to Physical/Occupational Therapy; Future; Expected date: 12/14/2023    8. Chronic bilateral back pain, unspecified back location  -     Ambulatory referral/consult to Physical/Occupational Therapy; Future; Expected date: 12/14/2023         T2DM-  last A1C of 5.8(4/23)<--5.6(10/22)<--5.9(4/22)<--6.5(3/21)<--6.0(3/21)      HTN- stable on Micardis       Chronic prostatitis- pt has seen Urology       Anxiety- stable on Lexapro 10 mg daily       Hx of basal cell carcinoma on face- followed by Derm      Thyroid nodule- neg Bx(12/9/19)      hx of nephrolithiasis- stable     DJD Cervical spine/back pain- referral to Pain management and PT      F/u in 6 months for annual exam

## 2023-12-08 ENCOUNTER — TELEPHONE (OUTPATIENT)
Dept: INTERNAL MEDICINE | Facility: CLINIC | Age: 83
End: 2023-12-08
Payer: MEDICARE

## 2023-12-15 ENCOUNTER — CLINICAL SUPPORT (OUTPATIENT)
Dept: REHABILITATION | Facility: HOSPITAL | Age: 83
End: 2023-12-15
Attending: INTERNAL MEDICINE
Payer: MEDICARE

## 2023-12-15 DIAGNOSIS — M53.86 DECREASED ROM OF LUMBAR SPINE: ICD-10-CM

## 2023-12-15 DIAGNOSIS — M47.812 OSTEOARTHRITIS OF CERVICAL SPINE, UNSPECIFIED SPINAL OSTEOARTHRITIS COMPLICATION STATUS: ICD-10-CM

## 2023-12-15 DIAGNOSIS — G89.29 CHRONIC BILATERAL BACK PAIN, UNSPECIFIED BACK LOCATION: ICD-10-CM

## 2023-12-15 DIAGNOSIS — R29.898 WEAKNESS OF BOTH LOWER EXTREMITIES: Primary | ICD-10-CM

## 2023-12-15 DIAGNOSIS — M54.9 CHRONIC BILATERAL BACK PAIN, UNSPECIFIED BACK LOCATION: ICD-10-CM

## 2023-12-15 DIAGNOSIS — R29.898 UPPER EXTREMITY WEAKNESS: ICD-10-CM

## 2023-12-15 PROCEDURE — 97110 THERAPEUTIC EXERCISES: CPT | Mod: HCNC

## 2023-12-15 PROCEDURE — 97161 PT EVAL LOW COMPLEX 20 MIN: CPT | Mod: HCNC

## 2023-12-15 NOTE — PLAN OF CARE
[facility]   Physical Therapy Initial Evaluation     Date: 12/15/2023   Name: Ajit SCHMIDT [facility]  Clinic Number: [id number]    Therapy Diagnosis:   Encounter Diagnoses   Name Primary?    Osteoarthritis of cervical spine, unspecified spinal osteoarthritis complication status     Chronic bilateral back pain, unspecified back location     Weakness of both lower extremities Yes    Decreased ROM of lumbar spine     Upper extremity weakness      Physician: Homer Sen, *    Physician Orders: PT Eval and Treat   Medical Diagnosis from Referral: M47.812 (ICD-10-CM) - Osteoarthritis of cervical spine, unspecified spinal osteoarthritis complication [id number],G89.29 (ICD-10-CM) - Chronic bilateral back pain, unspecified back location  Evaluation Date: 12/15/2023  Authorization Period Expiration: TBD  Plan of Care Expiration: 2/28/2024  Progress Note Due: 1/15/2024  Visit # / Visits authorized: 1/ 1   FOTO: 1/5    FOTO Initial Evaluation: 46  FOTO 2nd F/U: NA  FOTO Discharge: NA    Precautions: Standard     Time In: 1045  Time Out: 1130  Total Appointment Time (timed & untimed codes): 45 minutes      SUBJECTIVE     Date of onset: Years    History of current condition - Ajit reports: he is having neck and back pain for several years and this is affecting his walking and balance. Patient was in therapy a few years ago and once his neck/back felt better he was able to walk better. Patient reports he use to go to the gym but has stopped for about three years. Patient feels that he has no strength in his legs. Patient needs to hold on to a grocery cart when shopping due to weakness in legs and poor posture. Patient would like to work on his strength and posture     Falls: two years ago     Prior Therapy: Yes  Social History:  lives alone  Occupation: Retired   Prior Level of Function: Sedentary  Current Level of Function: Sedentary     Pain:  Current 0/10, worst 2/10, best 0/10   Location:  bilateral back  and neck    Description: Aching and Tight  Aggravating Factors: walking long distances      Patients goals: To get stronger in legs and improve posture      Medical History:   Past Medical History:   Diagnosis Date    Cataract     Chronic prostatitis     Glaucoma     Hypertension     Hyperthyroidism     pt states he has a fatty tumor    Obesity (BMI 30-39.9)     Osteoarthritis of cervical spine 3/20/2020    Type 2 diabetes mellitus without complication, with long-term current use of insulin 10/26/2021    Vitamin D insufficiency        Surgical History:   Ajit Ayoub  has a past surgical history that includes Transurethral resection of prostate; Circumcision (N/A, 12/5/2018); and Cystoscopy (N/A, 12/5/2018).    Medications:   Ajit has a current medication list which includes the following prescription(s): alprazolam, clotrimazole, escitalopram oxalate, latanoprost, lotemax sm, lumigan, melatonin, multivitamin, nabumetone, neomycin-polymyxin-dexamethasone, telmisartan, triamcinolone acetonide 0.1%, and vitamin d.    Allergies:   Review of patient's allergies indicates:   Allergen Reactions    Contrast media Rash    Pneumoc 13-lori conj-dip cr(pf) Rash          OBJECTIVE     Lumbar AROM: Pain/Dysfunction with Movement:   Flexion: 40 degrees    Extension: 15 degrees    Right side bending: 15 degrees    Left side bending: 15 degrees    Right rotation: 50% limited    Left rotation: 50% limited      Myotome / MMT Right Left Pain/Dysfunction with Movement   L1,2- Hip Flexion (Femoral Nerve) 4/5 4/5    L3,4- Knee Extension (Femoral Nerve) 4/5 4/5    L4,5- Ankle Dorsiflexion (Deep Fibular Nerve) 4/5 4/5    L5, S1- Ankle plantarflexion (Tibial Nerve) 4/5 4/5    Hip Abduction 4-/5 4-/5    Knee Flexion 4/5 4/5             Cervical AROM: Pain/Dysfunction with Movement:   Flexion: 35 degrees    Extension: 10 degrees    Right side bending: 10 degrees    Left side bending: 10 degrees    Right rotation: 45  degrees    Left rotation: 45 degrees      Myotome Right Left Pain/Dysfunction with Movement   C4- Shoulder Elevation 4/5 4/5    C5- Shoulder Abduction 4/5 4/5    C6- Wrist Extension 4/5 4/5    C7- Elbow Extension 4/5 4/5    C8- Phalangeal Abduction 4/5 4/5    T1- 5th Finger Abduction 4/5 4/5      Posture Assessment: forward head posture     30s sit to stand: 4 reps     TUG: with cane 27 seconds       FOTO Lumbar Survey    Therapist reviewed FOTO scores for Ajit Ayoub on 12/15/2023.   FOTO documents entered into [employer] - see Media section.    Intake Score: 46         TREATMENT     Total Treatment time (time-based codes) separate from Evaluation: 15 minutes      Ajit received the treatments listed below:      therapeutic exercises to develop strength, endurance, and ROM for 15 minutes including:                PATIENT EDUCATION AND HOME EXERCISES     Education provided:   - Purpose of PT  - HEP    Written Home Exercises Provided: yes. Exercises were reviewed and Ajit was able to demonstrate them prior to the end of the session.  Ajit demonstrated good  understanding of the education provided. See EMR under Patient Instructions for exercises provided during therapy sessions.    ASSESSMENT     Ajit is a 83 y.o. male referred to outpatient Physical Therapy with a medical diagnosis of M47.812 (ICD-10-CM) - Osteoarthritis of cervical spine, unspecified spinal osteoarthritis complication [id number],G89.29 (ICD-10-CM) - Chronic bilateral back pain, unspecified back location.     Patient presents with a chronic history of neck and back pain and poor posture. Patient presents with decreased lumbar and cervical range of motion, decreased lower and upper ex strength. Patient was progressed with exercises today to address problem list. Patient educated walking with a walker would be safer and reduce risk of falls for patient.     Patient prognosis is Fair.   Patient will benefit from skilled outpatient Physical  Therapy to address the deficits stated above and in the chart below, provide patient /family education, and to maximize patientt's level of independence.     Plan of care discussed with patient: Yes  Patient's spiritual, cultural and educational needs considered and patient is agreeable to the plan of care and goals as stated below:     Anticipated Barriers for therapy: Chronic symptoms     Medical Necessity is demonstrated by the following  History  Co-morbidities and personal factors that may impact the plan of care Co-morbidities:   See medical history section    Personal Factors:   no deficits     high   Examination  Body Structures and Functions, activity limitations and participation restrictions that may impact the plan of care Body Regions:   neck  back  lower extremities  upper extremities    Body Systems:    ROM  strength    Participation Restrictions:   None    Activity limitations:   Learning and applying knowledge  no deficits    General Tasks and Commands  no deficits    Communication  no deficits    Mobility  no deficits    Self care  no deficits    Domestic Life  no deficits    Interactions/Relationships  no deficits    Life Areas  no deficits    Community and Social Life  recreation and leisure         high   Clinical Presentation stable and uncomplicated low   Decision Making/ Complexity Score: low     Goals:  Short Term Goals (4 Weeks):  1. Pt will be compliant with HEP to supplement PT in decreasing pain with functional mobility.  2. Pt will perform pallof press with good control to demonstrate improved core strength.  3. Pt will improve lumbar and cervical ROM by 5-10 degrees in all planes to improve functional mobility.  4. Pt will improve impaired LE and UE MMTs by 1/2 score to improve strength for functional tasks.  Long Term Goals (8 Weeks):   1. Pt will improve FOTO score to >/= 65 to decrease perceived limitation with maintaining/changing body position.   2. Pt will perform squat with good  form to reduce risk of re injury with lifting.  3. Pt will improve impaired LE and UE MMTs by 1 score to improve strength for functional tasks.  4. Pt will report no pain during lumbar ROM to promote functional mobility.      PLAN   Plan of care Certification: 12/15/2023 to 2/28/2024.    Outpatient Physical Therapy 1 times weekly for 6 weeks to include the following interventions: Gait Training, Manual Therapy, Moist Heat/ Ice, Neuromuscular Re-ed, Patient Education, Self Care, Therapeutic Activities, and Therapeutic Exercise.     Brain Vigil, PT      I CERTIFY THE NEED FOR THESE SERVICES FURNISHED UNDER THIS PLAN OF TREATMENT AND WHILE UNDER MY CARE   Physician's comments:     Physician's Signature: ___________________________________________________

## 2023-12-18 NOTE — PROGRESS NOTES
[facility]   Physical Therapy Treatment Note      Name: Ajit SCHMIDT [facility]  Clinic Number: [id number]    Therapy Diagnosis:   Encounter Diagnoses   Name Primary?    Weakness of both lower extremities Yes    Decreased ROM of lumbar spine     Upper extremity weakness      Physician: Homer Sen, *    Visit Date: 12/19/2023       Physician Orders: PT Eval and Treat   Medical Diagnosis from Referral: M47.812 (ICD-10-CM) - Osteoarthritis of cervical spine, unspecified spinal osteoarthritis complication [id number],G89.29 (ICD-10-CM) - Chronic bilateral back pain, unspecified back location  Evaluation Date: 12/15/2023  Authorization Period Expiration: 12/31/2023  Plan of Care Expiration: 2/28/2024  Progress Note Due: 1/15/2024  Visit # / Visits authorized: 1/ 1, 1/5  FOTO: 1/5     FOTO Initial Evaluation: 46  FOTO 2nd F/U: NA  FOTO Discharge: NA     Precautions: Standard and HTN     PTA Visit #: 1/5     Time In: 8:58 am   Time Out: 9:40 am   Total Billable Time: 42 minutes    Subjective     Pt reports: that he has been working on his HEP, but had a little difficulty setting up the band at home because his door handles are plastic.  He was compliant with home exercise program.  Response to previous treatment: last session was initial eval  Functional change: none at this time     Pain: 5/10  Location: Cervical/lumbar spine     Objective      Objective Measures updated at progress report unless specified.     Treatment     Ajit received the treatments listed below:      therapeutic exercises to develop strength, endurance, ROM, and flexibility for 30 minutes including:  Nu-step (for improved reciprocal movement, UE/LE endurance, and joint lubrication) 3 min   LAQ 1lb 3x10 BLE   SAQ 1lb 2x10   Standing hip abd/ext 1lb 2x10 BLE   Standing march 1lb 2x10 BLE   Supine clam GTB 2x10   SLR 2x10 BLE       neuromuscular re-education activities to improve: Balance, Coordination, Proprioception,  "Posture, and Motor Control for 10 minutes. The following activities were included:  Rows RTB 3x10   Shld ext RTB 3x10   Scap retraction 3x10 3"     therapeutic activities to improve functional performance for 2  minutes, including:  Sit to stands from chair + airex 10x       Patient Education and Home Exercises       Education provided:   - HEP    Written Home Exercises Provided: Patient instructed to cont prior HEP. Exercises were reviewed and Ajit was able to demonstrate them prior to the end of the session.  Ajit demonstrated good  understanding of the education provided. See EMR under Patient Instructions for exercises provided during therapy sessions    Assessment     Initiated therapy program following pt's initial evaluation with no adverse effects. Performed activities for LE and UE strength and improved functional mobility. Pt exhibited the need for tactile cues during NMR in order to ensure proper scapular retraction. Will continue to progress pt towards goals as tolerated during future sessions.     Ajit Is progressing well towards his goals.   Pt prognosis is Fair.     Pt will continue to benefit from skilled outpatient physical therapy to address the deficits listed in the problem list box on initial evaluation, provide pt/family education and to maximize pt's level of independence in the home and community environment.     Pt's spiritual, cultural and educational needs considered and pt agreeable to plan of care and goals.     Anticipated barriers to physical therapy: Chronic symptoms    Goals:   Short Term Goals (4 Weeks):  1. Pt will be compliant with HEP to supplement PT in decreasing pain with functional mobility.  2. Pt will perform pallof press with good control to demonstrate improved core strength.  3. Pt will improve lumbar and cervical ROM by 5-10 degrees in all planes to improve functional mobility.  4. Pt will improve impaired LE and UE MMTs by 1/2 score to improve strength for " functional tasks.  Long Term Goals (8 Weeks):   1. Pt will improve FOTO score to >/= 65 to decrease perceived limitation with maintaining/changing body position.   2. Pt will perform squat with good form to reduce risk of re injury with lifting.  3. Pt will improve impaired LE and UE MMTs by 1 score to improve strength for functional tasks.  4. Pt will report no pain during lumbar ROM to promote functional mobility.      Plan     Plan of care Certification: 12/15/2023 to 2/28/2024.    Christiana Vergara PTA

## 2023-12-19 ENCOUNTER — CLINICAL SUPPORT (OUTPATIENT)
Dept: REHABILITATION | Facility: HOSPITAL | Age: 83
End: 2023-12-19
Payer: MEDICARE

## 2023-12-19 DIAGNOSIS — M53.86 DECREASED ROM OF LUMBAR SPINE: ICD-10-CM

## 2023-12-19 DIAGNOSIS — R29.898 UPPER EXTREMITY WEAKNESS: ICD-10-CM

## 2023-12-19 DIAGNOSIS — R29.898 WEAKNESS OF BOTH LOWER EXTREMITIES: Primary | ICD-10-CM

## 2023-12-19 PROCEDURE — 97112 NEUROMUSCULAR REEDUCATION: CPT | Mod: HCNC,CQ

## 2023-12-19 PROCEDURE — 97110 THERAPEUTIC EXERCISES: CPT | Mod: HCNC,CQ

## 2023-12-27 NOTE — PROGRESS NOTES
"[facility]   Physical Therapy Treatment Note      Name: Ajit SCHMIDT [facility]  Clinic Number: [id number]    Therapy Diagnosis:   No diagnosis found.    Physician: Homer Sen, *    Visit Date: 12/28/2023       Physician Orders: PT Eval and Treat   Medical Diagnosis from Referral: M47.812 (ICD-10-CM) - Osteoarthritis of cervical spine, unspecified spinal osteoarthritis complication [id number],G89.29 (ICD-10-CM) - Chronic bilateral back pain, unspecified back location  Evaluation Date: 12/15/2023  Authorization Period Expiration: 12/31/2023  Plan of Care Expiration: 2/28/2024  Progress Note Due: 1/15/2024  Visit # / Visits authorized: 1/ 1, 2/5  FOTO: 3/5     FOTO Initial Evaluation: 46  FOTO 2nd F/U: NA  FOTO Discharge: NA     Precautions: Standard and HTN     PTA Visit #: 1/5     Time In: 830   Time Out: 915  Total Billable Time: 45 minutes    Subjective     Pt reports: he has trouble with sit to stands and walking most days. Patient reports he needs to do more band exercises at home.   He was compliant with home exercise program.  Response to previous treatment: no adverse effects   Functional change: none at this time     Pain: 5/10  Location: Cervical/lumbar spine     Objective      Objective Measures updated at progress report unless specified.     Treatment     Ajit received the treatments listed below:      therapeutic exercises to develop strength, endurance, ROM, and flexibility for 30 minutes including:    Nu-step (for improved reciprocal movement, UE/LE endurance, and joint lubrication) 8 min   LAQ 1lb 3x10 BLE   SAQ 1lb 2x10   Standing hip abd/ext 1lb 2x10 BLE   Standing march 1lb 2x10 BLE   Supine clam GTB 2x10   SLR 2x10 BLE       neuromuscular re-education activities to improve: Balance, Coordination, Proprioception, Posture, and Motor Control for 10 minutes. The following activities were included:  Rows RTB 3x10   Shld ext RTB 3x10   Scap retraction 3x10 3" "     therapeutic activities to improve functional performance for 5  minutes, including:    Sit to stands from chair + airex 10x       Patient Education and Home Exercises       Education provided:   - HEP    Written Home Exercises Provided: Patient instructed to cont prior HEP. Exercises were reviewed and Ajit was able to demonstrate them prior to the end of the session.  Ajit demonstrated good  understanding of the education provided. See EMR under Patient Instructions for exercises provided during therapy sessions    Assessment     Continued therapy program today without adverse effects. SBA for safety today. Cont to address balance and lower ex strength.     Ajit Is progressing well towards his goals.   Pt prognosis is Fair.     Pt will continue to benefit from skilled outpatient physical therapy to address the deficits listed in the problem list box on initial evaluation, provide pt/family education and to maximize pt's level of independence in the home and community environment.     Pt's spiritual, cultural and educational needs considered and pt agreeable to plan of care and goals.     Anticipated barriers to physical therapy: Chronic symptoms    Goals:   Short Term Goals (4 Weeks):  1. Pt will be compliant with HEP to supplement PT in decreasing pain with functional mobility.  2. Pt will perform pallof press with good control to demonstrate improved core strength.  3. Pt will improve lumbar and cervical ROM by 5-10 degrees in all planes to improve functional mobility.  4. Pt will improve impaired LE and UE MMTs by 1/2 score to improve strength for functional tasks.  Long Term Goals (8 Weeks):   1. Pt will improve FOTO score to >/= 65 to decrease perceived limitation with maintaining/changing body position.   2. Pt will perform squat with good form to reduce risk of re injury with lifting.  3. Pt will improve impaired LE and UE MMTs by 1 score to improve strength for functional tasks.  4. Pt will report  no pain during lumbar ROM to promote functional mobility.      Plan     Plan of care Certification: 12/15/2023 to 2/28/2024.    Brain Vigil, PT

## 2023-12-28 ENCOUNTER — CLINICAL SUPPORT (OUTPATIENT)
Dept: REHABILITATION | Facility: HOSPITAL | Age: 83
End: 2023-12-28
Payer: MEDICARE

## 2023-12-28 DIAGNOSIS — R29.898 WEAKNESS OF BOTH LOWER EXTREMITIES: Primary | ICD-10-CM

## 2023-12-28 DIAGNOSIS — R29.898 UPPER EXTREMITY WEAKNESS: ICD-10-CM

## 2023-12-28 DIAGNOSIS — M53.86 DECREASED ROM OF LUMBAR SPINE: ICD-10-CM

## 2023-12-28 PROCEDURE — 97110 THERAPEUTIC EXERCISES: CPT | Mod: HCNC

## 2023-12-28 PROCEDURE — 97112 NEUROMUSCULAR REEDUCATION: CPT | Mod: HCNC

## 2024-01-02 NOTE — PROGRESS NOTES
[facility]   Physical Therapy Treatment Note      Name: Ajit SCHMIDT [facility]  Clinic Number: [id number]    Therapy Diagnosis:   Encounter Diagnoses   Name Primary?    Weakness of both lower extremities Yes    Decreased ROM of lumbar spine     Upper extremity weakness        Physician: Homer Sen, *    Visit Date: 1/3/2024       Physician Orders: PT Eval and Treat   Medical Diagnosis from Referral: M47.812 (ICD-10-CM) - Osteoarthritis of cervical spine, unspecified spinal osteoarthritis complication [id number],G89.29 (ICD-10-CM) - Chronic bilateral back pain, unspecified back location  Evaluation Date: 12/15/2023  Authorization Period Expiration: 12/31/2023  Plan of Care Expiration: 2/28/2024  Progress Note Due: 1/15/2024  Visit # / Visits authorized: 1/ 1, 3/5  FOTO: 4/5     FOTO Initial Evaluation: 46  FOTO 2nd F/U: NA  FOTO Discharge: NA     Precautions: Standard and HTN     PTA Visit #: 0/5     Time In: 900  Time Out: 950  Total Billable Time: 50 minutes    Subjective     Pt reports: he is doing ok today with nothing new to report. Patients biggest complaint is bending forward and leg strength   He was compliant with home exercise program.  Response to previous treatment: no adverse effects   Functional change: none at this time     Pain: 5/10  Location: Cervical/lumbar spine     Objective      Objective Measures updated at progress report unless specified.     Treatment     Ajit received the treatments listed below:      therapeutic exercises to develop strength, endurance, ROM, and flexibility for 35 minutes including:    Nu-step (for improved reciprocal movement, UE/LE endurance, and joint lubrication) 8 min   LAQ 1lb 3x10 BLE   SAQ 1lb 2x10   Ball rolls   - flexion 30x  - diagonals 15x ea way   Standing hip abd/ext 1lb 2x10 BLE   Standing march 1lb 2x10 BLE  NT  Supine clam GTB 2x10   SLR 2x10 BLE       neuromuscular re-education activities to improve: Balance,  "Coordination, Proprioception, Posture, and Motor Control for 15 minutes. The following activities were included:  Rows RTB 3x10   Shld ext RTB 3x10   Scap retraction 3x10 3"     therapeutic activities to improve functional performance for 0 minutes, including:    Sit to stands from chair + airex 10x       Patient Education and Home Exercises       Education provided:   - HEP    Written Home Exercises Provided: Patient instructed to cont prior HEP. Exercises were reviewed and Ajit was able to demonstrate them prior to the end of the session.  Ajit demonstrated good  understanding of the education provided. See EMR under Patient Instructions for exercises provided during therapy sessions    Assessment     Continued therapy program today without adverse effects. SBA for safety today. Cont to address balance and lower ex strength. Patient enjoyed ball rolls to help with flexion movements     Ajit Is progressing well towards his goals.   Pt prognosis is Fair.     Pt will continue to benefit from skilled outpatient physical therapy to address the deficits listed in the problem list box on initial evaluation, provide pt/family education and to maximize pt's level of independence in the home and community environment.     Pt's spiritual, cultural and educational needs considered and pt agreeable to plan of care and goals.     Anticipated barriers to physical therapy: Chronic symptoms    Goals:   Short Term Goals (4 Weeks):  1. Pt will be compliant with HEP to supplement PT in decreasing pain with functional mobility.  2. Pt will perform pallof press with good control to demonstrate improved core strength.  3. Pt will improve lumbar and cervical ROM by 5-10 degrees in all planes to improve functional mobility.  4. Pt will improve impaired LE and UE MMTs by 1/2 score to improve strength for functional tasks.  Long Term Goals (8 Weeks):   1. Pt will improve FOTO score to >/= 65 to decrease perceived limitation with " maintaining/changing body position.   2. Pt will perform squat with good form to reduce risk of re injury with lifting.  3. Pt will improve impaired LE and UE MMTs by 1 score to improve strength for functional tasks.  4. Pt will report no pain during lumbar ROM to promote functional mobility.      Plan     Plan of care Certification: 12/15/2023 to 2/28/2024.    Brain Vigil, PT

## 2024-01-03 ENCOUNTER — CLINICAL SUPPORT (OUTPATIENT)
Dept: REHABILITATION | Facility: HOSPITAL | Age: 84
End: 2024-01-03
Payer: MEDICARE

## 2024-01-03 DIAGNOSIS — R29.898 WEAKNESS OF BOTH LOWER EXTREMITIES: Primary | ICD-10-CM

## 2024-01-03 DIAGNOSIS — R29.898 UPPER EXTREMITY WEAKNESS: ICD-10-CM

## 2024-01-03 DIAGNOSIS — M53.86 DECREASED ROM OF LUMBAR SPINE: ICD-10-CM

## 2024-01-03 PROCEDURE — 97110 THERAPEUTIC EXERCISES: CPT | Mod: HCNC

## 2024-01-03 PROCEDURE — 97112 NEUROMUSCULAR REEDUCATION: CPT | Mod: HCNC

## 2024-01-11 NOTE — PROGRESS NOTES
[facility]   Physical Therapy Treatment Note      Name: Ajit SCHMIDT [facility]  Clinic Number: [id number]    Therapy Diagnosis:   Encounter Diagnoses   Name Primary?    Weakness of both lower extremities Yes    Decreased ROM of lumbar spine     Upper extremity weakness          Physician: Homer Sen, *    Visit Date: 1/12/2024       Physician Orders: PT Eval and Treat   Medical Diagnosis from Referral: M47.812 (ICD-10-CM) - Osteoarthritis of cervical spine, unspecified spinal osteoarthritis complication [id number],G89.29 (ICD-10-CM) - Chronic bilateral back pain, unspecified back location  Evaluation Date: 12/15/2023  Authorization Period Expiration: 12/31/2023  Plan of Care Expiration: 2/28/2024  Progress Note Due: 1/15/2024  Visit # / Visits authorized: 1/1, 4/5  FOTO: 5/5     FOTO Initial Evaluation: 46  FOTO 2nd F/U: NA  FOTO Discharge: NA     Precautions: Standard and HTN     PTA Visit #: 0/5     Time In: 1130  Time Out: 1215  Total Billable Time: 45 minutes    Subjective     Pt reports: his back still hurts, but his legs feel a little better.    He was compliant with home exercise program.  Response to previous treatment: no adverse effects   Functional change: none at this time     Pain: 5/10  Location: Cervical/lumbar spine     Objective      Objective Measures updated at progress report unless specified.     Treatment     Ajit received the treatments listed below:      therapeutic exercises to develop strength, endurance, ROM, and flexibility for 30 minutes including:    Nu-step (for improved reciprocal movement, UE/LE endurance, and joint lubrication) 8 min   LAQ 2lb 3x10 BLE   SAQ 2lb 3x10   Ball rolls   - flexion 10x  - diagonals 10x ea way     Supine clam GTB 2x10   SLR 2x10 BLE     Standing hip abd/ext 1lb 2x10 BLE - NT      neuromuscular re-education activities to improve: Balance, Coordination, Proprioception, Posture, and Motor Control for 15 minutes. The following  "activities were included:    Hip abd iso 20x  Hip add iso 20x   Rows GTB 3x10   Shld ext GTB 3x10   Scap retraction 3x10 3"     therapeutic activities to improve functional performance for 0 minutes, including:    Sit to stands from chair + airex 10x       Patient Education and Home Exercises       Education provided:   - HEP    Written Home Exercises Provided: Patient instructed to cont prior HEP. Exercises were reviewed and Ajit was able to demonstrate them prior to the end of the session.  Ajit demonstrated good  understanding of the education provided. See EMR under Patient Instructions for exercises provided during therapy sessions    Assessment     Continued therapy program today without adverse effects. SBA for safety today. Cont to address balance and lower ex strength. Patient enjoyed ball rolls to help with flexion movements. Patients progress is limited.     Ajit Is progressing well towards his goals.   Pt prognosis is Fair.     Pt will continue to benefit from skilled outpatient physical therapy to address the deficits listed in the problem list box on initial evaluation, provide pt/family education and to maximize pt's level of independence in the home and community environment.     Pt's spiritual, cultural and educational needs considered and pt agreeable to plan of care and goals.     Anticipated barriers to physical therapy: Chronic symptoms    Goals:   Short Term Goals (4 Weeks):  1. Pt will be compliant with HEP to supplement PT in decreasing pain with functional mobility.  2. Pt will perform pallof press with good control to demonstrate improved core strength.  3. Pt will improve lumbar and cervical ROM by 5-10 degrees in all planes to improve functional mobility.  4. Pt will improve impaired LE and UE MMTs by 1/2 score to improve strength for functional tasks.  Long Term Goals (8 Weeks):   1. Pt will improve FOTO score to >/= 65 to decrease perceived limitation with maintaining/changing " body position.   2. Pt will perform squat with good form to reduce risk of re injury with lifting.  3. Pt will improve impaired LE and UE MMTs by 1 score to improve strength for functional tasks.  4. Pt will report no pain during lumbar ROM to promote functional mobility.      Plan     Plan of care Certification: 12/15/2023 to 2/28/2024.    Brain Vigil, PT

## 2024-01-12 ENCOUNTER — CLINICAL SUPPORT (OUTPATIENT)
Dept: REHABILITATION | Facility: HOSPITAL | Age: 84
End: 2024-01-12
Payer: MEDICARE

## 2024-01-12 DIAGNOSIS — R29.898 WEAKNESS OF BOTH LOWER EXTREMITIES: Primary | ICD-10-CM

## 2024-01-12 DIAGNOSIS — M53.86 DECREASED ROM OF LUMBAR SPINE: ICD-10-CM

## 2024-01-12 DIAGNOSIS — R29.898 UPPER EXTREMITY WEAKNESS: ICD-10-CM

## 2024-01-12 PROCEDURE — 97110 THERAPEUTIC EXERCISES: CPT | Mod: HCNC

## 2024-01-12 PROCEDURE — 97112 NEUROMUSCULAR REEDUCATION: CPT | Mod: HCNC

## 2024-01-19 ENCOUNTER — CLINICAL SUPPORT (OUTPATIENT)
Dept: REHABILITATION | Facility: HOSPITAL | Age: 84
End: 2024-01-19
Payer: MEDICARE

## 2024-01-19 DIAGNOSIS — R29.898 WEAKNESS OF BOTH LOWER EXTREMITIES: Primary | ICD-10-CM

## 2024-01-19 DIAGNOSIS — R29.898 UPPER EXTREMITY WEAKNESS: ICD-10-CM

## 2024-01-19 DIAGNOSIS — M53.86 DECREASED ROM OF LUMBAR SPINE: ICD-10-CM

## 2024-01-19 PROCEDURE — 97110 THERAPEUTIC EXERCISES: CPT | Mod: HCNC

## 2024-01-19 PROCEDURE — 97112 NEUROMUSCULAR REEDUCATION: CPT | Mod: HCNC

## 2024-01-19 NOTE — PROGRESS NOTES
[facility]   Physical Therapy Treatment Note      Name: Ajit SCHMIDT [facility]  Clinic Number: [id number]    Therapy Diagnosis:   Encounter Diagnoses   Name Primary?    Weakness of both lower extremities Yes    Decreased ROM of lumbar spine     Upper extremity weakness          Physician: Homer Sen, *    Visit Date: 1/19/2024       Physician Orders: PT Eval and Treat   Medical Diagnosis from Referral: M47.812 (ICD-10-CM) - Osteoarthritis of cervical spine, unspecified spinal osteoarthritis complication [id number],G89.29 (ICD-10-CM) - Chronic bilateral back pain, unspecified back location  Evaluation Date: 12/15/2023  Authorization Period Expiration: 12/31/2023  Plan of Care Expiration: 2/28/2024  Progress Note Due: 1/15/2024  Visit # / Visits authorized: 1/1, 3/20  FOTO: 5/5     FOTO Initial Evaluation: 46  FOTO 2nd F/U: NA  FOTO Discharge: NA     Precautions: Standard and HTN     PTA Visit #: 0/5     Time In: 745  Time Out: 830  Total Billable Time: 45 minutes    Subjective     Pt reports: he pulled a muscle in his R hand since Tuesday. Patient is feeling better today but would rather just focus on his legs today.     He was compliant with home exercise program.  Response to previous treatment: no adverse effects   Functional change: none at this time     Pain: 5/10  Location: Cervical/lumbar spine     Objective      Objective Measures updated at progress report unless specified.     Treatment     Ajit received the treatments listed below:      therapeutic exercises to develop strength, endurance, ROM, and flexibility for 35 minutes including:    Nu-step (for improved reciprocal movement, UE/LE endurance, and joint lubrication) 8 min   LAQ 3lb 3x10 BLE   SAQ 3lb 3x10   Ball rolls   - flexion 10x  - diagonals 10x ea way   Supine clam GTB 2x10   SLR 2x10 BLE   LTR     Standing hip abd/ext 1lb 2x10 BLE - NT      neuromuscular re-education activities to improve: Balance, Coordination,  "Proprioception, Posture, and Motor Control for 10 minutes. The following activities were included:    Hip abd iso 20x  Hip add iso 20x   Quad sets 20x    Not today   Rows GTB 3x10   Shld ext GTB 3x10   Scap retraction 3x10 3"     therapeutic activities to improve functional performance for 0 minutes, including:    Sit to stands from chair + airex 10x       Patient Education and Home Exercises       Education provided:   - HEP    Written Home Exercises Provided: Patient instructed to cont prior HEP. Exercises were reviewed and Ajit was able to demonstrate them prior to the end of the session.  Ajit demonstrated good  understanding of the education provided. See EMR under Patient Instructions for exercises provided during therapy sessions    Assessment     Continued therapy program today without adverse effects. Did not perform UE exercises today due to swelling in the R thumb. Patient did well with LE strength and lumbar stretching today. Patients progress is limited.     Ajit Is progressing well towards his goals.   Pt prognosis is Fair.     Pt will continue to benefit from skilled outpatient physical therapy to address the deficits listed in the problem list box on initial evaluation, provide pt/family education and to maximize pt's level of independence in the home and community environment.     Pt's spiritual, cultural and educational needs considered and pt agreeable to plan of care and goals.     Anticipated barriers to physical therapy: Chronic symptoms    Goals:   Short Term Goals (4 Weeks):  1. Pt will be compliant with HEP to supplement PT in decreasing pain with functional mobility.  2. Pt will perform pallof press with good control to demonstrate improved core strength.  3. Pt will improve lumbar and cervical ROM by 5-10 degrees in all planes to improve functional mobility.  4. Pt will improve impaired LE and UE MMTs by 1/2 score to improve strength for functional tasks.  Long Term Goals (8 Weeks): "   1. Pt will improve FOTO score to >/= 65 to decrease perceived limitation with maintaining/changing body position.   2. Pt will perform squat with good form to reduce risk of re injury with lifting.  3. Pt will improve impaired LE and UE MMTs by 1 score to improve strength for functional tasks.  4. Pt will report no pain during lumbar ROM to promote functional mobility.      Plan     Plan of care Certification: 12/15/2023 to 2/28/2024.    Brain Vigil, PT

## 2024-04-09 ENCOUNTER — TELEPHONE (OUTPATIENT)
Dept: INTERNAL MEDICINE | Facility: CLINIC | Age: 84
End: 2024-04-09
Payer: MEDICARE

## 2024-04-09 DIAGNOSIS — M79.641 BILATERAL HAND PAIN: Primary | ICD-10-CM

## 2024-04-09 DIAGNOSIS — M79.642 BILATERAL HAND PAIN: Primary | ICD-10-CM

## 2024-04-09 DIAGNOSIS — M79.89 BILATERAL HAND SWELLING: ICD-10-CM

## 2024-04-09 NOTE — TELEPHONE ENCOUNTER
----- Message from Stephanie Galvez sent at 4/9/2024  2:31 PM CDT -----  Contact: [phone]  Pt is requesting a callback in regards to a recommendation for a Rheumatologist in the [place] area. Please call to advise.     Pt states he is having issues driving due to the swelling in his hands.             Eduard

## 2024-04-20 DIAGNOSIS — I10 ESSENTIAL HYPERTENSION: ICD-10-CM

## 2024-04-20 RX ORDER — TELMISARTAN 40 MG/1
40 TABLET ORAL
Qty: 90 TABLET | Refills: 2 | Status: SHIPPED | OUTPATIENT
Start: 2024-04-20

## 2024-04-20 NOTE — TELEPHONE ENCOUNTER
No care due was identified.  [facility] Embedded Care Due Messages. Reference number: [id number].   4/20/2024 4:17:25 AM CDT

## 2024-04-21 NOTE — TELEPHONE ENCOUNTER
Refill Decision Note   Ajit Ayoub  is requesting a refill authorization.  Brief Assessment and Rationale for Refill:  Approve     Medication Therapy Plan:         Comments:     Note composed:10:57 PM 04/20/2024

## 2024-04-29 ENCOUNTER — HOSPITAL ENCOUNTER (OUTPATIENT)
Dept: RADIOLOGY | Facility: HOSPITAL | Age: 84
Discharge: HOME | End: 2024-04-29
Attending: INTERNAL MEDICINE
Payer: MEDICARE

## 2024-04-29 ENCOUNTER — OFFICE VISIT (OUTPATIENT)
Dept: RHEUMATOLOGY | Facility: CLINIC | Age: 84
End: 2024-04-29
Payer: MEDICARE

## 2024-04-29 VITALS
SYSTOLIC BLOOD PRESSURE: 157 MMHG | HEIGHT: 65 IN | HEART RATE: 91 BPM | WEIGHT: 163.13 LBS | DIASTOLIC BLOOD PRESSURE: 85 MMHG | BODY MASS INDEX: 27.18 KG/M2

## 2024-04-29 DIAGNOSIS — M21.962 ACQUIRED DEFORMITY OF LEFT FOOT: Primary | ICD-10-CM

## 2024-04-29 DIAGNOSIS — M79.89 BILATERAL HAND SWELLING: ICD-10-CM

## 2024-04-29 DIAGNOSIS — M47.816 SPONDYLOSIS OF LUMBAR REGION WITHOUT MYELOPATHY OR RADICULOPATHY: ICD-10-CM

## 2024-04-29 DIAGNOSIS — M17.12 PRIMARY OSTEOARTHRITIS OF LEFT KNEE: ICD-10-CM

## 2024-04-29 DIAGNOSIS — M79.642 BILATERAL HAND PAIN: ICD-10-CM

## 2024-04-29 DIAGNOSIS — R29.6 RECURRENT FALLS: ICD-10-CM

## 2024-04-29 DIAGNOSIS — R26.9 GAIT ABNORMALITY: ICD-10-CM

## 2024-04-29 DIAGNOSIS — M79.641 BILATERAL HAND PAIN: ICD-10-CM

## 2024-04-29 DIAGNOSIS — M11.262 CHONDROCALCINOSIS OF LEFT KNEE: ICD-10-CM

## 2024-04-29 DIAGNOSIS — M89.9 DISORDER OF BONE, UNSPECIFIED: ICD-10-CM

## 2024-04-29 DIAGNOSIS — M11.20 CALCIUM PYROPHOSPHATE DEPOSITION DISEASE (CPPD): ICD-10-CM

## 2024-04-29 DIAGNOSIS — M47.812 SPONDYLOSIS OF CERVICAL REGION WITHOUT MYELOPATHY OR RADICULOPATHY: ICD-10-CM

## 2024-04-29 PROCEDURE — 73030 X-RAY EXAM OF SHOULDER: CPT | Mod: 26,HCNC,LT, | Performed by: RADIOLOGY

## 2024-04-29 PROCEDURE — 73130 X-RAY EXAM OF HAND: CPT | Mod: TC,50,HCNC

## 2024-04-29 PROCEDURE — 73110 X-RAY EXAM OF WRIST: CPT | Mod: TC,50,HCNC

## 2024-04-29 PROCEDURE — 3079F DIAST BP 80-89 MM HG: CPT | Mod: HCNC,CPTII,S$GLB, | Performed by: INTERNAL MEDICINE

## 2024-04-29 PROCEDURE — 1125F AMNT PAIN NOTED PAIN PRSNT: CPT | Mod: HCNC,CPTII,S$GLB, | Performed by: INTERNAL MEDICINE

## 2024-04-29 PROCEDURE — 99999 PR PBB SHADOW E&M-EST. PATIENT-LVL V: CPT | Mod: PBBFAC,HCNC,, | Performed by: INTERNAL MEDICINE

## 2024-04-29 PROCEDURE — 3288F FALL RISK ASSESSMENT DOCD: CPT | Mod: HCNC,CPTII,S$GLB, | Performed by: INTERNAL MEDICINE

## 2024-04-29 PROCEDURE — 73030 X-RAY EXAM OF SHOULDER: CPT | Mod: TC,HCNC,LT

## 2024-04-29 PROCEDURE — 1160F RVW MEDS BY RX/DR IN RCRD: CPT | Mod: HCNC,CPTII,S$GLB, | Performed by: INTERNAL MEDICINE

## 2024-04-29 PROCEDURE — 1101F PT FALLS ASSESS-DOCD LE1/YR: CPT | Mod: HCNC,CPTII,S$GLB, | Performed by: INTERNAL MEDICINE

## 2024-04-29 PROCEDURE — 1159F MED LIST DOCD IN RCRD: CPT | Mod: HCNC,CPTII,S$GLB, | Performed by: INTERNAL MEDICINE

## 2024-04-29 PROCEDURE — 3077F SYST BP >= 140 MM HG: CPT | Mod: HCNC,CPTII,S$GLB, | Performed by: INTERNAL MEDICINE

## 2024-04-29 PROCEDURE — 73130 X-RAY EXAM OF HAND: CPT | Mod: 26,50,HCNC, | Performed by: RADIOLOGY

## 2024-04-29 PROCEDURE — 99205 OFFICE O/P NEW HI 60 MIN: CPT | Mod: HCNC,S$GLB,, | Performed by: INTERNAL MEDICINE

## 2024-04-29 PROCEDURE — 73110 X-RAY EXAM OF WRIST: CPT | Mod: 26,50,HCNC, | Performed by: RADIOLOGY

## 2024-04-29 RX ORDER — PREDNISONE 10 MG/1
TABLET ORAL
Qty: 120 TABLET | Refills: 0 | Status: SHIPPED | OUTPATIENT
Start: 2024-04-29

## 2024-04-29 RX ORDER — PREDNISONE 10 MG/1
TABLET ORAL
Qty: 120 TABLET | Refills: 0 | Status: SHIPPED | OUTPATIENT
Start: 2024-04-29 | End: 2024-04-29

## 2024-04-29 RX ORDER — COLCHICINE 0.6 MG/1
TABLET ORAL
Qty: 60 TABLET | Refills: 5 | Status: SHIPPED | OUTPATIENT
Start: 2024-04-29 | End: 2024-06-05

## 2024-04-29 RX ORDER — CEPHALEXIN 500 MG/1
500 CAPSULE ORAL 4 TIMES DAILY
COMMUNITY
Start: 2024-03-06

## 2024-04-29 RX ORDER — COLCHICINE 0.6 MG/1
TABLET ORAL
Qty: 60 TABLET | Refills: 5 | Status: SHIPPED | OUTPATIENT
Start: 2024-04-29 | End: 2024-04-29

## 2024-04-29 NOTE — PROGRESS NOTES
Chief Complaint   Patient presents with    Disease Management       Patient was referred by     History of presenting illness    84 year old white male with acute attacks of joint pain comes for evaluation    Extensive H/P in A/P    Past history    Cervical spine degenerative arthritis  Lumbar spine degenerative arthritis  Recurrent falls  HTN  Prediabetes  Anxiety  Chronic prostatitis    Family history  Stroke and heart attacks  Diabetes       Social history  Not a smoker,alcoholic        Review of Systems      No skin rashes,malar rash,photosensitivity   No telangiectasias   No calcinosis   No psoriasis   Male pattern baldness  No oral and nasal ulcers   No dry eyes and dry mouth   No pleurisy or any cardiopulmonary complaints   No dysphagia,diplopia and dysphonia and muscle weakness   No n/v/d/c   No acid reflux+   No raynaud's+   No digital ulcers   No cytopenias   No renal issues   No blood clots   No fever,chills,night sweats,weight loss and loss of appetite   No new onset headaches   No recurrent conjunctivitis or uveitis or scleritis or episcleritis   No chronic or bloody diarrhea with no u colitis or crohn's /inflammatory bowel disease   No penile and urethral  d/c/STDs/no ulcers   No unexplained lymphadenopathy,parotitis   No seizures,strokes,psychosis  No sclerodactyly  No puffy hands  No perioral tightness           Physical Exam   Constitutional: He is oriented to person, place, and time. No distress.   HENT:   Head: Normocephalic.   Mouth/Throat: Oropharynx is clear and moist.   Eyes: Pupils are equal, round, and reactive to light. Conjunctivae are normal. Right eye exhibits no discharge. Left eye exhibits no discharge. No scleral icterus.   Neck: No thyromegaly present.   Cardiovascular: Normal rate, regular rhythm and normal heart sounds.   Pulmonary/Chest: Effort normal and breath sounds normal. No stridor.   Abdominal: Soft. Bowel sounds are normal.   Musculoskeletal:         General:  Normal range of motion.      Cervical back: Normal range of motion.   Lymphadenopathy:     He has no cervical adenopathy.   Neurological: He is alert and oriented to person, place, and time.   Skin: Skin is warm. No rash noted. He is not diaphoretic.   Psychiatric: Affect and judgment normal.         Assessment       84 year old male with    Pre diabetes   Skin cancer needing Mohl's surgery  Contact dermatitis  Chronic allergic sinusitis and running watery eyes,allergies causing eye symptoms  B/l cataracts needing removals  Seborrhea   Hyperthyroidism  Chronic prostatitis  Glaucoma     Left foot hammer toes with loss of balance  Pesplanus     Cervical spine degenerative arthritis    Visualized intracranial structures are normal in appearance.  Visualized paranasal sinuses and mastoid air cells are clear.  Thyroid demonstrates a left, partially calcified nodule measuring 5.1 x 4.2 cm.  Soft tissue structures of the neck are normal in appearance.  No evidence of lymphadenopathy.  Vascular structures demonstrate no significant calcific atherosclerosis.  Lung apices are clear.     Intervertebral disc space narrowing of all cervical levels.  Vertebral body heights and spinal alignment are satisfactorily maintained.  No evidence of acute fracture or dislocation.  Multilevel degenerative changes of the cervical spine with no high-grade stenosis.  Multilevel mild to moderate neural foraminal narrowing noted.      Lumbar spine degenerative arthritis  Mild DJD.  The lumbosacral disc space is slightly narrowed.  The other disc spaces are well maintained.  No fracture, spondylolisthesis or bone destruction identified.  Suggestion of gallstones.  Vascular calcifications noted.     Recurrent falls    HTN  Anxiety    He comes in  asking if he has gout    Jan 2024- he had an   Acute episode of right forearm swelling    Orthopedics and urgent care finally diagnosed him with cellulitis  He was sent to infectious disease  specialist  He also got antibiotics cephalexin for 10 days       Right hand hurts and swells periodically since  When it swells he cannot open a door  It appears that the right wrist,hand MCPs are affected  He then went to hand doctor   He got steroid shot in the right wrist  He was told to have calcium deposits in the right wrist    He had an episode  of acute onset pain,swelling in the Left knee- ? Gouty arthritis - 7 years ago    Right knee xray  Visualized osseous structures appear intact, with no definite evidence of recent fracture observed.  No lytic destructive process.  Some tibiofemoral joint space narrowing and extensive chondrocalcinosis is seen, as is spurring of the tibial spines and minimal patellar spurring.  No demonstrable joint effusion.       He had another episode of pain and swelling in the Left shoulder -? Gouty arthritis- 7 years ago    Recurrent kidney stones- not sure if calcium or uric acid stones-this resolved      Rheumatologic ROS  Contact dermatitis b/l legs    UTD with cancer screening  UTD with most vaccines except RSV,covid booster    On exam he has b/l leg edema   Toe deformities    But the right hand and wrist are swollen and many MCPs and PIPs are swollen    I suspect he has   Left knee osteoarthritis  Left knee chondrocalcinosis  CPPD arthropathy right hand/wrist  He never had podagra      1. Acquired deformity of left foot    2. Bilateral hand pain    3. Bilateral hand swelling    4. Disorder of bone, unspecified    5. Calcium pyrophosphate deposition disease (CPPD)    6. Primary osteoarthritis of left knee    7. Chondrocalcinosis of left knee    8. Spondylosis of cervical region without myelopathy or radiculopathy    9. Spondylosis of lumbar region without myelopathy or radiculopathy    10. Recurrent falls    11. Gait abnormality        Reviewed labs/xrays  Reviewed medications    Ordered labs /xrays    I have independently reviewed his left knee xray      New problem      Plan    Prednisone 40 mg daily for a week  30 mg daily for a week  20 mg daily for a week  10 mg daily for a week and stop    Colchicine 0.6 mg daily   Then 0.6 mg bid    Uric acid    CPPD labs :  PTH,phos,magnesium,vit d  Iron,tibc,ferritin  Thyroid checked    RF,CCP  ESR,CRP    HLAB27    Hand xrays  Wrist xrays  Left shoulder xray    Podiatry- for Left foot deformities- loss of balance for evaluation and also for the left ankle sprain    Physical therapy for fall prevention,gait training     More than 60 minutes spent taking care of the patient    Rtc in 3 months       Ajit was seen today for disease management.    Diagnoses and all orders for this visit:    Acquired deformity of left foot  -     Ambulatory referral/consult to Podiatry; Future  -     Ambulatory referral/consult to Physical/Occupational Therapy; Future    Bilateral hand pain  -     Ambulatory referral/consult to Rheumatology  -     X-Ray Shoulder 2 or More Views Left; Future  -     X-Ray Hand 3 View Bilateral; Future  -     X-Ray Wrist Complete Bilateral; Future  -     Rheumatoid Factor; Future  -     Cyclic Citrullinated Peptide Antibody, IgG; Future  -     HLA B27 Antigen; Future  -     Sedimentation rate; Future  -     C-Reactive Protein; Future  -     Iron and TIBC; Future  -     Ferritin; Future    Bilateral hand swelling  -     Ambulatory referral/consult to Rheumatology  -     Uric Acid; Future  -     X-Ray Shoulder 2 or More Views Left; Future  -     X-Ray Hand 3 View Bilateral; Future  -     X-Ray Wrist Complete Bilateral; Future  -     Rheumatoid Factor; Future  -     Cyclic Citrullinated Peptide Antibody, IgG; Future  -     HLA B27 Antigen; Future  -     Sedimentation rate; Future  -     C-Reactive Protein; Future  -     Iron and TIBC; Future  -     Ferritin; Future  -     PTH, intact; Future  -     Magnesium; Future  -     PHOSPHORUS; Future  -     Vitamin D; Future    Disorder of bone, unspecified  -     Vitamin D;  Future    Calcium pyrophosphate deposition disease (CPPD)    Primary osteoarthritis of left knee    Chondrocalcinosis of left knee    Spondylosis of cervical region without myelopathy or radiculopathy    Spondylosis of lumbar region without myelopathy or radiculopathy  -     Ambulatory referral/consult to Physical/Occupational Therapy; Future    Recurrent falls  -     Ambulatory referral/consult to Physical/Occupational Therapy; Future    Gait abnormality  -     Ambulatory referral/consult to Physical/Occupational Therapy; Future    Other orders  -     Discontinue: predniSONE (DELTASONE) 10 MG tablet; Prednisone 40 mg daily for a week 30 mg daily for a week 20 mg daily for a week 10 mg daily for a week and stop  -     Discontinue: predniSONE (DELTASONE) 10 MG tablet; Prednisone 40 mg daily for a week 30 mg daily for a week 20 mg daily for a week 10 mg daily for a week and stop  -     Discontinue: colchicine (COLCRYS) 0.6 mg tablet; 0.6 mg daily for a week and then 0.6 mg twice daily  -     colchicine (COLCRYS) 0.6 mg tablet; 0.6 mg daily for a week and then 0.6 mg twice daily  -     predniSONE (DELTASONE) 10 MG tablet; Prednisone 40 mg daily for a week 30 mg daily for a week 20 mg daily for a week 10 mg daily for a week and stop        Medication changes made  Refilled medications  Toxicity issues discussed    Old records reviewed and summarised  Records are from [facility]       Follow up in 3 months     Notes will be sent to PCP    Preventive medicine

## 2024-05-01 ENCOUNTER — TELEPHONE (OUTPATIENT)
Dept: INTERNAL MEDICINE | Facility: CLINIC | Age: 84
End: 2024-05-01
Payer: MEDICARE

## 2024-05-01 DIAGNOSIS — Z79.4 TYPE 2 DIABETES MELLITUS WITHOUT COMPLICATION, WITH LONG-TERM CURRENT USE OF INSULIN: Primary | ICD-10-CM

## 2024-05-01 DIAGNOSIS — Z12.5 SCREENING PSA (PROSTATE SPECIFIC ANTIGEN): ICD-10-CM

## 2024-05-01 DIAGNOSIS — I15.2 HYPERTENSION ASSOCIATED WITH TYPE 2 DIABETES MELLITUS: ICD-10-CM

## 2024-05-01 DIAGNOSIS — E11.59 HYPERTENSION ASSOCIATED WITH TYPE 2 DIABETES MELLITUS: ICD-10-CM

## 2024-05-01 DIAGNOSIS — E11.9 TYPE 2 DIABETES MELLITUS WITHOUT COMPLICATION, WITH LONG-TERM CURRENT USE OF INSULIN: Primary | ICD-10-CM

## 2024-05-13 ENCOUNTER — CLINICAL SUPPORT (OUTPATIENT)
Dept: REHABILITATION | Facility: HOSPITAL | Age: 84
End: 2024-05-13
Attending: INTERNAL MEDICINE
Payer: MEDICARE

## 2024-05-13 DIAGNOSIS — M47.816 SPONDYLOSIS OF LUMBAR REGION WITHOUT MYELOPATHY OR RADICULOPATHY: ICD-10-CM

## 2024-05-13 DIAGNOSIS — R29.6 RECURRENT FALLS: ICD-10-CM

## 2024-05-13 DIAGNOSIS — R26.9 GAIT ABNORMALITY: ICD-10-CM

## 2024-05-13 DIAGNOSIS — M21.962 ACQUIRED DEFORMITY OF LEFT FOOT: ICD-10-CM

## 2024-05-13 PROCEDURE — 97110 THERAPEUTIC EXERCISES: CPT | Mod: HCNC

## 2024-05-13 PROCEDURE — 97161 PT EVAL LOW COMPLEX 20 MIN: CPT | Mod: HCNC

## 2024-05-13 NOTE — PLAN OF CARE
[facility]   Physical Therapy Initial Evaluation      Name: Ajit Norrisfer  Clinic Number: [id number]    Therapy Diagnosis:   Encounter Diagnoses   Name Primary?    Acquired deformity of left foot     Spondylosis of lumbar region without myelopathy or radiculopathy     Recurrent falls     Gait abnormality         Physician: Preston Phillip*    Physician Orders: PT Eval and Treat   Medical Diagnosis from Referral:   M21.962 (ICD-10-CM) - Acquired deformity of left foot   M47.816 (ICD-10-CM) - Spondylosis of lumbar region without myelopathy or radiculopathy   R29.6 (ICD-10-CM) - Recurrent falls   R26.9 (ICD-10-CM) - Gait abnormality     Evaluation Date: 5/13/2024  Authorization Period Expiration: TBD  Plan of Care Expiration: 8/13/2024  Progress Note Due: 6/13/2024    Visit # / Visits authorized: 1/1   FOTO: 1/3    Precautions: Standard and Fall     Time In: 2:30  Time Out: 3:15  Total Billable Time: 45 minutes    SUBJECTIVE     Date of onset: ~7 years when he fell down the first time. Over the last 1.5 years has gotten worse    History of current condition - Ajit reports his L foot gives him the most problems, he reports a little bit of pain 5 or 6/10. Some days it does not bother him at all. He notes mild discomfort to upper and lower back. Patient reports he cannot find a decent pair of shoes but he has inserts from podiatrist that he wears. Patient reports when he stands he feels like he is leaning/falling forward a little bit. He cannot step over things as he feels like he will fall.    Falls: Last fall in 2021.   2 other falls prior to this which included hitting his head.    Prior Therapy: Yes  Social History: lives alone in apartment complex. No stairs.   Occupation: Retired.   Prior Level of Function: Sedentary   Current Level of Function: Sedentary. Tries to do some yoga/exercises that he sees on tv. Able to drive to the store for groceries, needs a buggy to lean on to  walk around the store. Sometimes he cannot stand for long periods due to his foot pain.    Pain:  Current 5/10, best 0/10   Location: L foot; Ball of foot and PF at medial arch  Description: Aching and Dull, Numbness  Aggravating Factors: Standing at times  Easing Factors: Massages his foot with a cream    Patients goals: Patient would like to walk better and do a few more things     Medical History:   Past Medical History:   Diagnosis Date    Cataract     Chronic prostatitis     Glaucoma     Hypertension     Hyperthyroidism     pt states he has a fatty tumor    Obesity (BMI 30-39.9)     Osteoarthritis of cervical spine 3/20/2020    Type 2 diabetes mellitus without complication, with long-term current use of insulin 10/26/2021    Vitamin D insufficiency      Surgical History:   Ajit Ayoub  has a past surgical history that includes Transurethral resection of prostate; Circumcision (N/A, 12/5/2018); and Cystoscopy (N/A, 12/5/2018).    Medications:   Ajit has a current medication list which includes the following prescription(s): alprazolam, cephalexin, clotrimazole, colchicine, escitalopram oxalate, latanoprost, lotemax sm, lumigan, melatonin, multivitamin, nabumetone, neomycin-polymyxin-dexamethasone, prednisone, telmisartan, triamcinolone acetonide 0.1%, and vitamin d.    Allergies:   Review of patient's allergies indicates:   Allergen Reactions    Contrast media Rash    Pneumoc 13-lori conj-dip cr(pf) Rash        OBJECTIVE    Observation/Palpation: Patient sits with rounded shoulders and forward head posture. TTP to mid thoracic SP and lower lumbar SP.  Patient with increased flexion of phalanges 3-5 on L foot. TTP to metatarsal heads/proximal phalanges 2-3. TTP to medial arch on plantar aspect L foot. Patient with no notable pes planus.       Lumbar AROM: Pain/Dysfunction with Movement:   Flexion: 60% Discomfort upper back   Right side bending: WFL    Left side bending: WFL        LE Strength:   Right Left  "Pain/Dysfunction with Movement   Hip Flexion 4- 4-    Hip Extension 4- 4- Modified in standing   Hip ABD 4- 4-    Hip IR 4 4-    Hip ER 4 4-    Knee Flexion 4- 4-    Knee Extension 4 4-    Ankle DF 4- 4-    *Patient able to perform 10, DL calf raises with UE support    Functional Assessment:   Gait analysis: Pt demonstrates slight shuffling gait pattern at times, small step length B.  Patient ambulates with 4 point cane in his R hand as a precaution but mainly holds it off the ground throughout ambulation.    Stepping over small thuan: Cane and CGA as precaution. Pt able to step over small thuan leading with R LE but unable to lift L LE over ground and instead circles around thuan with L.    SLS: 3" on L LE with CGA for safety. 4" on R LE with CGA for safety.    TU seconds. CGA for safety, no AD  30 sec STS: x5 reps in 30 seconds      Intake Outcome Measure for FOTO  (Not completed this date)    Therapist reviewed FOTO scores for Ajit BRAYAN Ayoub on 2024.   FOTO report - see Media section or FOTO account episode details.    Intake Score: Not completed         Treatment     Total Treatment time (time-based codes) separate from Evaluation: 10 minutes     Ajit received the treatments listed below:      neuromuscular re-education activities to improve: Balance, Coordination, Sense, and Proprioception for 10 minutes. The following activities were included:  Standing marches with UE support  Standing with alternating march + step forward, march + step backward.   Sit > stands     Patient Education and Home Exercises     Education provided: Discussed PT eval findings and POC. Educated patient on proper form for sit to stands.     Written Home Exercises Provided: yes. Exercises were reviewed and Ajit was able to demonstrate them prior to the end of the session.  Ajit demonstrated fair  understanding of the education provided. See EMR under Patient Instructions for exercises provided during therapy " sessions.    ASSESSMENT     Ajit is a 84 y.o. male referred to outpatient Physical Therapy with a medical diagnosis of acquired deformity of left foot, recurrent falls and gait abnormality. Patient presents to PT with poor sitting and standing posture demonstrated forward head and rounded shoulders. He exhibits gross LE strength deficits and poor balance during gait analysis, stepping over obstacles and attempting SLS. Patient with increased fall risk as indicated by previous falls, TUG and 30 SSTS assessment.    Patient prognosis is Fair/Good.   Patient will benefit from skilled outpatient Physical Therapy to address the deficits stated above and in the chart below, provide patient /family education, and to maximize patientt's level of independence.     Plan of care discussed with patient: Yes  Patient's spiritual, cultural and educational needs considered and patient is agreeable to the plan of care and goals as stated below:     Anticipated Barriers for therapy: None    Medical Necessity is demonstrated by the following  History  Co-morbidities and personal factors that may impact the plan of care [] LOW: no personal factors / co-morbidities  [x] MODERATE: 1-2 personal factors / co-morbidities  [] HIGH: 3+ personal factors / co-morbidities    Moderate / High Support Documentation:   Co-morbidities affecting plan of care: HTN, DM    Personal Factors:   Sedentary lifestyle     Examination  Body Structures and Functions, activity limitations and participation restrictions that may impact the plan of care [x] LOW: addressing 1-2 elements  [] MODERATE: 3+ elements  [] HIGH: 4+ elements (please support below)    Moderate / High Support Documentation:      Clinical Presentation [x] LOW: stable  [] MODERATE: Evolving  [] HIGH: Unstable     Decision Making/ Complexity Score: low       GOALS:  Short Term Goals (3 Weeks):  1. Pt will be compliant with HEP to supplement PT in decreasing pain with functional mobility.  2.  Pt will improve impaired LE MMTs by 1/2 score to improve strength for functional tasks.  3. Pt will improve 30 second STS to at least 6 reps for improved functioning and daily activities    Long Term Goals (6 Weeks):   1. Pt will improve FOTO score to >/= TBD to decrease perceived limitation with maintaining/changing body position.  Establish once FOTO completed  2. Pt will improve impaired LE MMTs by 1 score to improve strength for functional tasks.  3. Pt to improve TUG to </= 19 seconds in order to decrease patient fall risk   4. Pt to demonstrate stepping over small hurdles with Cane or Min A with good clearance of each LE, x5 trials    PLAN     Plan of care Certification: 5/13/2024 to 8/13/2024.    Outpatient Physical Therapy 2 times weekly for 6 weeks to include the following interventions: Gait Training, Manual Therapy, Neuromuscular Re-ed, Patient Education, Therapeutic Activities, and Therapeutic Exercise.     Roseann Gibbons, PT, DPT, Cert. DN  5/13/2024        Physician's Signature: _________________________________________ Date: ________________

## 2024-05-13 NOTE — PATIENT INSTRUCTIONS
HOME EXERCISE PROGRAM  Created by Roseann Gibbons PT, DPT  May 13th, 2024  View videos at [email or web address]        Standing marching in place-Supported    Stand facing your kitchen countertop. Place 1 or 2 hands on the counter for support. Begin marching in place, bringing your left knee up then down, followed by your right knee up and down. Continue alternating legs in this manner for the duration of the exercise.        FORWARD STEP AND MARCH    Stand with UE support on counter. March one leg up and step forward as if over obstacle. March the leg back up and step back to starting position. Alternate between legs. Repeat 10 Times   Hold 1 Second   Complete 2 Sets          SIT STAND - BOTH HANDS ASSIST    While seated in a chair, scoot forward towards the edge of the chair. Next, hold on to the arm rest with both hands for support and then raise up to standing.    Video # [id number] Repeat 10 Times   Hold 1 Second   Complete 2 Sets   Perform 1 Times a Day

## 2024-05-17 NOTE — PROGRESS NOTES
"[facility]   Physical Therapy Treatment Note      Name: Ajit SCHMIDT [facility]  Clinic Number: [id number]    Therapy Diagnosis:   Encounter Diagnoses   Name Primary?    Acquired deformity of left foot Yes    Gait abnormality     Weakness of both lower extremities     Recurrent falls      Physician: Preston Phillip*    Visit Date: 5/20/2024    Physician Orders: PT Eval and Treat   Medical Diagnosis from Referral:   M21.962 (ICD-10-CM) - Acquired deformity of left foot   M47.816 (ICD-10-CM) - Spondylosis of lumbar region without myelopathy or radiculopathy   R29.6 (ICD-10-CM) - Recurrent falls   R26.9 (ICD-10-CM) - Gait abnormality      Evaluation Date: 5/13/2024  Authorization Period Expiration: 12/31/24  Plan of Care Expiration: 8/13/2024  Progress Note Due: 6/13/2024     Visit # / Visits authorized: 1/1, 1/20  FOTO: 1/3     Precautions: Standard and Fall      PTA Visit #: 1/5     Time In: 2:32 pm   Time Out: 3:16 pm   Total Billable Time: 44 minutes    Subjective     Pt reports: that he is doing alright today but does occasionally get pain in his left foot.  He  was not yet given   home exercise program.  Response to previous treatment: last session was initial eval  Functional change: none at this time     Pain: 0/10  Location: n/a     Objective      Objective Measures updated at progress report unless specified.     Treatment     Ajit received the treatments listed below:      therapeutic exercises to develop strength, endurance, ROM, and flexibility for 19 minutes including:  Seated clams RTB 2x10   Seated adductor squeeze 3" 2x10   LAQ 2lb 2x10   Standing hip abd/ext 2lb 2x10       neuromuscular re-education activities to improve: Balance, Coordination, and Proprioception for 15 minutes. The following activities were included:  Standing marches c/ UE support 2x1min  Standing alt mar + step forward, march + step backward 2x10   Lateral step overs on airex 10 laps       therapeutic " activities to improve functional performance for 10  minutes, including:  STS (standard chair) 3x5 single UE support  Step ups on airex 2x10 lakesha       Patient Education and Home Exercises       Education provided:   - HEP    Written Home Exercises Provided: yes. Exercises were reviewed and Ajit was able to demonstrate them prior to the end of the session.  Ajit demonstrated good  understanding of the education provided. See EMR under Patient Instructions for exercises provided during therapy sessions    Assessment     Pt exhibited tolerance to initiation of all activities. Pt given SBA throughout NMR activities to ensure pt safety, however did not observe any LOB. Pt did, however, require verbal cues for correct sequencing during airex step ups and overs. Pt required increased rest breaks during STS due to bilateral knee pain. Will continue to progress pt towards goals as tolerated.     Ajit Is progressing well towards his goals.   Pt prognosis is Fair.     Pt will continue to benefit from skilled outpatient physical therapy to address the deficits listed in the problem list box on initial evaluation, provide pt/family education and to maximize pt's level of independence in the home and community environment.     Pt's spiritual, cultural and educational needs considered and pt agreeable to plan of care and goals.     Anticipated barriers to physical therapy: none    Goals:   Short Term Goals (3 Weeks):  1. Pt will be compliant with HEP to supplement PT in decreasing pain with functional mobility.  2. Pt will improve impaired LE MMTs by 1/2 score to improve strength for functional tasks.  3. Pt will improve 30 second STS to at least 6 reps for improved functioning and daily activities     Long Term Goals (6 Weeks):   1. Pt will improve FOTO score to >/= TBD to decrease perceived limitation with maintaining/changing body position.  Establish once FOTO completed  2. Pt will improve impaired LE MMTs by 1 score to  improve strength for functional tasks.  3. Pt to improve TUG to </= 19 seconds in order to decrease patient fall risk   4. Pt to demonstrate stepping over small hurdles with Cane or Min A with good clearance of each LE, x5 trials       Plan     Plan of care Certification: 5/13/2024 to 8/13/2024.    PT/PTA met face to face to discuss pt's treatment plan and progress towards established goals. Pt will be seen by a physical therapist minimally every 6th visit or every 30 days.      Christiana Vergara PTA

## 2024-05-20 ENCOUNTER — CLINICAL SUPPORT (OUTPATIENT)
Dept: REHABILITATION | Facility: HOSPITAL | Age: 84
End: 2024-05-20
Payer: MEDICARE

## 2024-05-20 DIAGNOSIS — M21.962 ACQUIRED DEFORMITY OF LEFT FOOT: Primary | ICD-10-CM

## 2024-05-20 DIAGNOSIS — R29.6 RECURRENT FALLS: ICD-10-CM

## 2024-05-20 DIAGNOSIS — R26.9 GAIT ABNORMALITY: ICD-10-CM

## 2024-05-20 DIAGNOSIS — R29.898 WEAKNESS OF BOTH LOWER EXTREMITIES: ICD-10-CM

## 2024-05-20 PROCEDURE — 97110 THERAPEUTIC EXERCISES: CPT | Mod: HCNC,CQ

## 2024-05-20 PROCEDURE — 97112 NEUROMUSCULAR REEDUCATION: CPT | Mod: HCNC,CQ

## 2024-05-20 PROCEDURE — 97530 THERAPEUTIC ACTIVITIES: CPT | Mod: HCNC,CQ

## 2024-05-23 NOTE — PROGRESS NOTES
"[facility]   Physical Therapy Treatment Note      Name: Ajit SCHMIDT [facility]  Clinic Number: [id number]    Therapy Diagnosis:   Encounter Diagnoses   Name Primary?    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   He  was not yet given   home exercise program.  Response to previous treatment: no adverse effects  Functional change: none at this time     Pain: 0/10  Location: n/a     Objective      Objective Measures updated at progress report unless specified.     Treatment     Ajit received the treatments listed below:      therapeutic exercises to develop strength, endurance, ROM, and flexibility for 12 minutes including:  Seated clams RTB 2x10   Seated adductor squeeze 3" 2x10   LAQ 2lb 2x10   Standing hip abd/ext 2lb 2x10       neuromuscular re-education activities to improve: Balance, Coordination, and Proprioception for 17 minutes. The following activities were included:  Standing marches on airex  c/ UE support 2x1min   Standing alt mar + step forward, march + step backward " "2x10   Lateral step overs on airex 10 laps   Lateral thuan step over 10x ea   Step to pattern over sm hurdles 2 laps BLE   Step throughout pattern over sm thuan 2 laps BLE       therapeutic activities to improve functional performance for 9 minutes, including:  STS (21" elevate mat) 3x5 no UE support  Step ups on airex 2x10 lakesha       Patient Education and Home Exercises       Education provided:   - HEP    Written Home Exercises Provided: yes. Exercises were reviewed and Ajit was able to demonstrate them prior to the end of the session.  Ajit demonstrated good  understanding of the education provided. See EMR under Patient Instructions for exercises provided during therapy sessions    Assessment     Progressed NMR activities this session with the addition of several new activities. Had patient perform step to and step through patterns with small hurdles. Pt required moderate verbal cues during step through in order to complete with proper sequencing. Pt given occasional seated rest breaks due to fatigue, but overall doing well.      Ajit Is progressing well towards his goals.   Pt prognosis is Fair.     Pt will continue to benefit from skilled outpatient physical therapy to address the deficits listed in the problem list box on initial evaluation, provide pt/family education and to maximize pt's level of independence in the home and community environment.     Pt's spiritual, cultural and educational needs considered and pt agreeable to plan of care and goals.     Anticipated barriers to physical therapy: none    Goals:   Short Term Goals (3 Weeks):  1. Pt will be compliant with HEP to supplement PT in decreasing pain with functional mobility.  2. Pt will improve impaired LE MMTs by 1/2 score to improve strength for functional tasks.  3. Pt will improve 30 second STS to at least 6 reps for improved functioning and daily activities     Long Term Goals (6 Weeks):   1. Pt will improve FOTO score to >/= TBD to " decrease perceived limitation with maintaining/changing body position.  Establish once FOTO completed  2. Pt will improve impaired LE MMTs by 1 score to improve strength for functional tasks.  3. Pt to improve TUG to </= 19 seconds in order to decrease patient fall risk   4. Pt to demonstrate stepping over small hurdles with Cane or Min A with good clearance of each LE, x5 trials       Plan     Plan of care Certification: 5/13/2024 to 8/13/2024.    PT/PTA met face to face to discuss pt's treatment plan and progress towards established goals. Pt will be seen by a physical therapist minimally every 6th visit or every 30 days.      Christiana Vergara PTA

## 2024-05-24 ENCOUNTER — CLINICAL SUPPORT (OUTPATIENT)
Dept: REHABILITATION | Facility: HOSPITAL | Age: 84
End: 2024-05-24
Payer: MEDICARE

## 2024-05-24 DIAGNOSIS — R29.898 WEAKNESS OF BOTH LOWER EXTREMITIES: ICD-10-CM

## 2024-05-24 DIAGNOSIS — R26.9 GAIT ABNORMALITY: ICD-10-CM

## 2024-05-24 DIAGNOSIS — R29.6 RECURRENT FALLS: ICD-10-CM

## 2024-05-24 DIAGNOSIS — M21.962 ACQUIRED DEFORMITY OF LEFT FOOT: Primary | ICD-10-CM

## 2024-05-24 PROCEDURE — 97530 THERAPEUTIC ACTIVITIES: CPT | Mod: HCNC,CQ

## 2024-05-24 PROCEDURE — 97112 NEUROMUSCULAR REEDUCATION: CPT | Mod: HCNC,CQ

## 2024-05-24 PROCEDURE — 97110 THERAPEUTIC EXERCISES: CPT | Mod: HCNC,CQ

## 2024-05-27 ENCOUNTER — CLINICAL SUPPORT (OUTPATIENT)
Dept: REHABILITATION | Facility: HOSPITAL | Age: 84
End: 2024-05-27
Payer: MEDICARE

## 2024-05-27 DIAGNOSIS — R29.898 WEAKNESS OF BOTH LOWER EXTREMITIES: ICD-10-CM

## 2024-05-27 DIAGNOSIS — R26.9 GAIT ABNORMALITY: Primary | ICD-10-CM

## 2024-05-27 PROCEDURE — 97110 THERAPEUTIC EXERCISES: CPT | Mod: HCNC

## 2024-05-27 PROCEDURE — 97112 NEUROMUSCULAR REEDUCATION: CPT | Mod: HCNC

## 2024-05-27 NOTE — PROGRESS NOTES
"[facility]   Physical Therapy Treatment Note      Name: Ajit Ayoub  Clinic Number: [id number]    Therapy Diagnosis:   Encounter Diagnoses   Name Primary?    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  Pt has been compliant with HEP.  Response to previous treatment: no adverse effects  Functional change: none at this time     Pain: 0/10  Location: n/a     Objective      Objective Measures updated at progress report unless specified.     Intake Outcome Measure for FOTO Foot - 5/27/2024     Therapist reviewed FOTO scores for Ajit Ayoub on 5/13/2024.   FOTO report - see Media section or FOTO account episode details.     Intake Score: 49%         Treatment     Ajit received the treatments listed below:      therapeutic exercises to develop strength, endurance, ROM, and flexibility for 10 minutes including:  Seated clams RTB 2x10   Seated adductor squeeze 3" 2x10   LAQ  2x10   Standing hip abd/ext 2x10     neuromuscular re-education activities to improve: Balance, Coordination, and Proprioception for 25 minutes. The following activities were " "included:  Standing marches on airex  c/ UE support 2x1min   Standing alt mar + step forward, march + step backward 2x10   Lateral step overs on airex 10 laps   NT  Lateral thuan step over 10x ea way  Step to pattern over sm hurdles multiple laps BLE   Step throughout pattern over sm thuan multiple laps BLE     therapeutic activities to improve functional performance for 5 minutes, including:  STS (21" elevate mat) 3x10 no UE support  Step ups on airex 2x10 lakesha   NT      Patient Education and Home Exercises       Education provided:   - HEP    Written Home Exercises Provided: yes. Exercises were reviewed and Ajit was able to demonstrate them prior to the end of the session.  Ajit demonstrated good  understanding of the education provided. See EMR under Patient Instructions for exercises provided during therapy sessions    Assessment   Patient tolerated activities well today with a few rest breaks, no ankle weights utilized this visit secondary to flare up of skin issues. Patient demonstrates good fluidity during thuan navigation with step to and step through patterns with single UE support multiple trials with each LE. He will continue to progress well with PT services at this time.    Ajit Is progressing well towards his goals.   Pt prognosis is Fair.     Pt will continue to benefit from skilled outpatient physical therapy to address the deficits listed in the problem list box on initial evaluation, provide pt/family education and to maximize pt's level of independence in the home and community environment.     Pt's spiritual, cultural and educational needs considered and pt agreeable to plan of care and goals.     Anticipated barriers to physical therapy: none    Goals:   Short Term Goals (3 Weeks):  1. Pt will be compliant with HEP to supplement PT in decreasing pain with functional mobility.  2. Pt will improve impaired LE MMTs by 1/2 score to improve strength for functional tasks.  3. Pt will improve " 30 second STS to at least 6 reps for improved functioning and daily activities     Long Term Goals (6 Weeks):   1. Pt will improve FOTO score to >/= 55% to decrease perceived limitation with maintaining/changing body position.  2. Pt will improve impaired LE MMTs by 1 score to improve strength for functional tasks.  3. Pt to improve TUG to </= 19 seconds in order to decrease patient fall risk   4. Pt to demonstrate stepping over small hurdles with Cane or Min A with good clearance of each LE, x5 trials       Plan     Plan of care Certification: 5/13/2024 to 8/13/2024.    PT/PTA met face to face to discuss pt's treatment plan and progress towards established goals. Pt will be seen by a physical therapist minimally every 6th visit or every 30 days.      Roseann Gibbons, PT, DPT, Cert DN

## 2024-05-31 ENCOUNTER — LAB VISIT (OUTPATIENT)
Dept: LAB | Facility: HOSPITAL | Age: 84
End: 2024-05-31
Attending: INTERNAL MEDICINE
Payer: MEDICARE

## 2024-05-31 ENCOUNTER — CLINICAL SUPPORT (OUTPATIENT)
Dept: REHABILITATION | Facility: HOSPITAL | Age: 84
End: 2024-05-31
Payer: MEDICARE

## 2024-05-31 DIAGNOSIS — E11.59 HYPERTENSION ASSOCIATED WITH TYPE 2 DIABETES MELLITUS: ICD-10-CM

## 2024-05-31 DIAGNOSIS — Z12.5 SCREENING PSA (PROSTATE SPECIFIC ANTIGEN): ICD-10-CM

## 2024-05-31 DIAGNOSIS — R29.898 WEAKNESS OF BOTH LOWER EXTREMITIES: Primary | ICD-10-CM

## 2024-05-31 DIAGNOSIS — I15.2 HYPERTENSION ASSOCIATED WITH TYPE 2 DIABETES MELLITUS: ICD-10-CM

## 2024-05-31 DIAGNOSIS — R26.9 GAIT ABNORMALITY: ICD-10-CM

## 2024-05-31 DIAGNOSIS — E11.9 TYPE 2 DIABETES MELLITUS WITHOUT COMPLICATION, WITH LONG-TERM CURRENT USE OF INSULIN: ICD-10-CM

## 2024-05-31 DIAGNOSIS — Z79.4 TYPE 2 DIABETES MELLITUS WITHOUT COMPLICATION, WITH LONG-TERM CURRENT USE OF INSULIN: ICD-10-CM

## 2024-05-31 LAB
BACTERIA #/AREA URNS AUTO: ABNORMAL /HPF
BACTERIA #/AREA URNS AUTO: ABNORMAL /HPF
BILIRUB UR QL STRIP: NEGATIVE
CLARITY UR REFRACT.AUTO: CLEAR
COLOR UR AUTO: YELLOW
GLUCOSE UR QL STRIP: NEGATIVE
HGB UR QL STRIP: ABNORMAL
KETONES UR QL STRIP: NEGATIVE
LEUKOCYTE ESTERASE UR QL STRIP: NEGATIVE
MICROSCOPIC COMMENT: ABNORMAL
MICROSCOPIC COMMENT: ABNORMAL
NITRITE UR QL STRIP: NEGATIVE
PH UR STRIP: 6 [PH] (ref 5–8)
PROT UR QL STRIP: ABNORMAL
RBC #/AREA URNS AUTO: 20 /HPF (ref 0–4)
RBC #/AREA URNS AUTO: 27 /HPF (ref 0–4)
SP GR UR STRIP: 1.02 (ref 1–1.03)
URN SPEC COLLECT METH UR: ABNORMAL
WBC #/AREA URNS AUTO: 1 /HPF (ref 0–5)
WBC #/AREA URNS AUTO: 1 /HPF (ref 0–5)

## 2024-05-31 PROCEDURE — 81001 URINALYSIS AUTO W/SCOPE: CPT | Mod: 91,HCNC | Performed by: INTERNAL MEDICINE

## 2024-05-31 PROCEDURE — 97112 NEUROMUSCULAR REEDUCATION: CPT | Mod: KX,HCNC,CQ

## 2024-05-31 PROCEDURE — 97530 THERAPEUTIC ACTIVITIES: CPT | Mod: KX,HCNC,CQ

## 2024-05-31 PROCEDURE — 97110 THERAPEUTIC EXERCISES: CPT | Mod: KX,HCNC,CQ

## 2024-05-31 NOTE — PROGRESS NOTES
"[facility]   Physical Therapy Treatment Note      Name: Ajit Ayoub  Clinic Number: [id number]    Therapy Diagnosis:   Encounter Diagnoses   Name Primary?    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   Pt has been compliant with HEP.  Response to previous treatment: no adverse effects  Functional change: none at this time     Pain: 0/10  Location: n/a     Objective      Objective Measures updated at progress report unless specified.     Intake Outcome Measure for FOTO Foot - 5/27/2024     Therapist reviewed FOTO scores for Ajit Ayoub on 5/13/2024.   FOTO report - see Media section or FOTO account episode details.     Intake Score: 49%         Treatment     Ajit received the treatments listed below:      therapeutic exercises to develop strength, endurance, ROM, and flexibility for 10 minutes including:  Seated clams RTB 2x10   Seated adductor squeeze 3" 2x10   LAQ  2x10   Standing hip abd/ext 3x10     neuromuscular re-education activities to improve: Balance, Coordination, and Proprioception for 21 minutes. The following activities were included:  Standing marches on airex  c/ UE support 2x1min   Standing alt mar + step forward, " "march + step backward 2x10   Lateral step overs on airex 10 laps   NT  Lateral walking thuan step over 4 laps over 4 hurdles   Step to pattern over sm hurdles multiple laps BLE   Step through pattern over sm thuan multiple laps BLE   Alt cone taps 2x10     therapeutic activities to improve functional performance for 8 minutes, including:  STS (21" elevate mat) 3x10 no UE support  Step ups on airex 2x10 lakesha         Patient Education and Home Exercises       Education provided:   - HEP    Written Home Exercises Provided: yes. Exercises were reviewed and Ajit was able to demonstrate them prior to the end of the session.  Ajit demonstrated good  understanding of the education provided. See EMR under Patient Instructions for exercises provided during therapy sessions    Assessment   Incorporated cone taps to NMR this session to further improve standing knee flexion and improved SLS. Pt used bilateral UE support on elevated mat for stability for cone taps. Continued to decline use of cuff weights due to ankle swelling, however opted for increased sets. Will continue to progress pt towards goals as tolerated.      Aijt Is progressing well towards his goals.   Pt prognosis is Fair.     Pt will continue to benefit from skilled outpatient physical therapy to address the deficits listed in the problem list box on initial evaluation, provide pt/family education and to maximize pt's level of independence in the home and community environment.     Pt's spiritual, cultural and educational needs considered and pt agreeable to plan of care and goals.     Anticipated barriers to physical therapy: none    Goals:   Short Term Goals (3 Weeks):  1. Pt will be compliant with HEP to supplement PT in decreasing pain with functional mobility.  2. Pt will improve impaired LE MMTs by 1/2 score to improve strength for functional tasks.  3. Pt will improve 30 second STS to at least 6 reps for improved functioning and daily activities   "   Long Term Goals (6 Weeks):   1. Pt will improve FOTO score to >/= 55% to decrease perceived limitation with maintaining/changing body position.  2. Pt will improve impaired LE MMTs by 1 score to improve strength for functional tasks.  3. Pt to improve TUG to </= 19 seconds in order to decrease patient fall risk   4. Pt to demonstrate stepping over small hurdles with Cane or Min A with good clearance of each LE, x5 trials       Plan     Plan of care Certification: 5/13/2024 to 8/13/2024.    PT/PTA met face to face to discuss pt's treatment plan and progress towards established goals. Pt will be seen by a physical therapist minimally every 6th visit or every 30 days.      Christiana Vergara PTA

## 2024-06-04 NOTE — PROGRESS NOTES
"[facility]   Physical Therapy Treatment Note      Name: Ajit Ayoub  Clinic Number: [id number]    Therapy Diagnosis:   Encounter Diagnoses   Name Primary?    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 Reports yesterday both ankles were swollen but has resolved today. Has been trying to walk more but feels limited due to heat.   Pt has been compliant with HEP.  Response to previous treatment: no adverse effects  Functional change: none at this time     Pain: 0/10  Location: n/a     Objective      Objective Measures updated at progress report unless specified.     Intake Outcome Measure for FOTO Foot - 5/27/2024     Therapist reviewed FOTO scores for Ajit Ayoub on 5/13/2024.   FOTO report - see Media section or FOTO account episode details.     Intake Score: 49%         Treatment     Ajit received the treatments listed below:      therapeutic exercises to develop strength, endurance, ROM, and flexibility for 10 minutes including:  Seated clams RTB 2x10   Seated adductor squeeze 3" 2x10   LAQ  2x10   Standing hip abd/ext 3x10     neuromuscular re-education activities to improve: Balance, Coordination, and Proprioception for 25 " "minutes. The following activities were included:  Standing marches on airex  c/ UE support 2x1min   Standing alt mar + step forward, march + step backward 2x10   Lateral step overs on airex 10 laps     Lateral walking thuan step over 4 laps over 4 hurdles   Step to pattern over sm hurdles multiple laps BLE   Step through pattern over sm thuan multiple laps BLE   Alt short cone taps 2x10     therapeutic activities to improve functional performance for 10 minutes, including:  STS (21" elevate mat) 3x10 no UE support  Step ups on airex 2x10 lakesha   SLS on airex 2 x 30" ( c/ UE use)   NuStep 4' (for improved endurance for walking program)  (stopped early by pt request)       Patient Education and Home Exercises       Education provided:   - HEP    Written Home Exercises Provided: yes. Exercises were reviewed and Ajit was able to demonstrate them prior to the end of the session.  Ajit demonstrated good  understanding of the education provided. See EMR under Patient Instructions for exercises provided during therapy sessions    Assessment      Incorporated single leg stance on airex to increase LE strength and improve balance. Pt had use of B UE for most of the SLS activity but challenged himself by using only fingertips and occasionally lifting one hand during activity to further increase difficulty. NuStep activity was also incorporated to increase pt endurance and functional mobility of UE/LE but was stopped before anticipated time due to patient request secondary to feeling fatigued. Will continue to progress pt towards goals as tolerated.     Ajit Is progressing well towards his goals.   Pt prognosis is Fair.     Pt will continue to benefit from skilled outpatient physical therapy to address the deficits listed in the problem list box on initial evaluation, provide pt/family education and to maximize pt's level of independence in the home and community environment.     Pt's spiritual, cultural and educational " needs considered and pt agreeable to plan of care and goals.     Anticipated barriers to physical therapy: none    Goals:   Short Term Goals (3 Weeks):  1. Pt will be compliant with HEP to supplement PT in decreasing pain with functional mobility.  2. Pt will improve impaired LE MMTs by 1/2 score to improve strength for functional tasks.  3. Pt will improve 30 second STS to at least 6 reps for improved functioning and daily activities     Long Term Goals (6 Weeks):   1. Pt will improve FOTO score to >/= 55% to decrease perceived limitation with maintaining/changing body position.  2. Pt will improve impaired LE MMTs by 1 score to improve strength for functional tasks.  3. Pt to improve TUG to </= 19 seconds in order to decrease patient fall risk   4. Pt to demonstrate stepping over small hurdles with Cane or Min A with good clearance of each LE, x5 trials       Plan     Plan of care Certification: 5/13/2024 to 8/13/2024.    PT/PTA met face to face to discuss pt's treatment plan and progress towards established goals. Pt will be seen by a physical therapist minimally every 6th visit or every 30 days.    I certify that I was present in the room directing the student in service delivery and guiding them using my skilled judgment. As the co-signing therapist I have reviewed the students documentation and am responsible for the treatment, assessment, and plan.   MAURICIO Yoon, PTA

## 2024-06-05 ENCOUNTER — TELEPHONE (OUTPATIENT)
Dept: INTERNAL MEDICINE | Facility: CLINIC | Age: 84
End: 2024-06-05
Payer: MEDICARE

## 2024-06-05 ENCOUNTER — CLINICAL SUPPORT (OUTPATIENT)
Dept: REHABILITATION | Facility: HOSPITAL | Age: 84
End: 2024-06-05
Payer: MEDICARE

## 2024-06-05 DIAGNOSIS — R26.9 GAIT ABNORMALITY: ICD-10-CM

## 2024-06-05 DIAGNOSIS — M1A.09X0 IDIOPATHIC CHRONIC GOUT OF MULTIPLE SITES WITHOUT TOPHUS: Primary | ICD-10-CM

## 2024-06-05 DIAGNOSIS — R29.898 WEAKNESS OF BOTH LOWER EXTREMITIES: Primary | ICD-10-CM

## 2024-06-05 PROCEDURE — 97530 THERAPEUTIC ACTIVITIES: CPT | Mod: KX,HCNC,CQ

## 2024-06-05 PROCEDURE — 97112 NEUROMUSCULAR REEDUCATION: CPT | Mod: KX,HCNC,CQ

## 2024-06-05 RX ORDER — COLCHICINE 0.6 MG/1
TABLET ORAL
Qty: 45 TABLET | Refills: 1 | Status: SHIPPED | OUTPATIENT
Start: 2024-06-05

## 2024-06-05 NOTE — TELEPHONE ENCOUNTER
----- Message from Celina Allen sent at 6/5/2024  9:51 AM CDT -----  Contact: [phone] Patient  Caller is requesting an earlier appointment then we can schedule.  Caller is requesting a message be sent to the provider.  If this is for urgent care symptoms, did you offer other providers at this location, providers at other locations, or [facility]? (yes, no, n/a):    If this is for the patients physical, did you offer to schedule next available and put on wait list, or to see NP or PA for their physical?  (yes, no, n/a):    When is the next available appointment with their provider:  09/03/2024  Reason for the appointment:  Annual  Patient preference of timeframe to be scheduled:  ASAP  Would the patient like a call back, or a response through their [facility] portal?:   Call Back Please. Thank you  Comments:  Pt is having to go to the bathroom a lot after starting a new medication and can not make his appointment this morning 06/05/2024 and is asking if he can be rescheduled please. Thank you

## 2024-06-05 NOTE — PROGRESS NOTES
Colchicine dose is 0.6 mg every other day  He can't take 0.6 mg daily or twice daily due to diarrhea

## 2024-06-07 ENCOUNTER — CLINICAL SUPPORT (OUTPATIENT)
Dept: REHABILITATION | Facility: HOSPITAL | Age: 84
End: 2024-06-07
Payer: MEDICARE

## 2024-06-07 DIAGNOSIS — R29.898 WEAKNESS OF BOTH LOWER EXTREMITIES: Primary | ICD-10-CM

## 2024-06-07 DIAGNOSIS — R26.9 GAIT ABNORMALITY: ICD-10-CM

## 2024-06-07 PROCEDURE — 97110 THERAPEUTIC EXERCISES: CPT | Mod: KX,HCNC,CQ

## 2024-06-07 PROCEDURE — 97530 THERAPEUTIC ACTIVITIES: CPT | Mod: KX,HCNC,CQ

## 2024-06-07 PROCEDURE — 97112 NEUROMUSCULAR REEDUCATION: CPT | Mod: KX,HCNC,CQ

## 2024-06-07 NOTE — PROGRESS NOTES
"[facility]   Physical Therapy Treatment Note      Name: Ajit Ayoub  Clinic Number: [id number]    Therapy Diagnosis:   Encounter Diagnoses   Name Primary?    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 Did hydraulic stepper at home for 45 min's this morning which he feels wore him out before therapy.   Pt has been compliant with HEP.  Response to previous treatment: no adverse effects  Functional change: none at this time     Pain: 0/10  Location: n/a     Objective      Objective Measures updated at progress report unless specified.     Intake Outcome Measure for FOTO Foot - 5/27/2024     Therapist reviewed FOTO scores for Ajit Ayoub on 5/13/2024.   FOTO report - see Media section or FOTO account episode details.     Intake Score: 49%         Treatment     Ajit received the treatments listed below:      therapeutic exercises to develop strength, endurance, ROM, and flexibility for 20 minutes including:  Seated clams RTB 2x10   Seated adductor squeeze 3" 2x10   LAQ  2x10   Standing hip abd/ext YTB at knees 3x10   Ankle DF with YTB B 2x10  DL Heel Raises 2x10    neuromuscular re-education activities to improve: Balance, " "Coordination, and Proprioception for 8 minutes. The following activities were included:  Standing marches on airex  c/ UE support 2x1min   Lateral step overs on airex 10 laps    Alt short cone taps 2x10     Standing alt mar + step forward, march + step backward 2x10 - not performed   Hurdles: (4 small) - not performed  - Lateral walking 4 laps - not performed  - Step to pattern BLE 4 laps - not performed  - Step through pattern BLE 4 laps - not performed       therapeutic activities to improve functional performance for 12 minutes, including:  STS (21" elevate mat) 2x10 no UE support  SLS on airex 2 x 30" ( c/ UE use)   NuStep 4' (for improved endurance for walking program)   Step ups on airex 2x10 lakesha     Patient Education and Home Exercises       Education provided:   - HEP    Written Home Exercises Provided: yes. Exercises were reviewed and Ajit was able to demonstrate them prior to the end of the session.  Ajit demonstrated good  understanding of the education provided. See EMR under Patient Instructions for exercises provided during therapy sessions    Assessment     Did not complete all planned exercises for today secondary to pt fatigue coming into to therapy. Pt was advised to be mindful of his time spent on hydraulic stepper before coming to therapy to ensure pt was not overworking himself before therapy. Pt with good tolerance to increases in resistance during standing exercises to improve LE strength. Pt responded well to ankle and calf strengthening exercises added to further improve LE MMT scores per established goals. Will continue to progress patient towards goals as tolerated.     Ajit Is progressing well towards his goals.   Pt prognosis is Fair.     Pt will continue to benefit from skilled outpatient physical therapy to address the deficits listed in the problem list box on initial evaluation, provide pt/family education and to maximize pt's level of independence in the home and community " environment.     Pt's spiritual, cultural and educational needs considered and pt agreeable to plan of care and goals.     Anticipated barriers to physical therapy: none    Goals:   Short Term Goals (3 Weeks):  1. Pt will be compliant with HEP to supplement PT in decreasing pain with functional mobility.  2. Pt will improve impaired LE MMTs by 1/2 score to improve strength for functional tasks.  3. Pt will improve 30 second STS to at least 6 reps for improved functioning and daily activities     Long Term Goals (6 Weeks):   1. Pt will improve FOTO score to >/= 55% to decrease perceived limitation with maintaining/changing body position.  2. Pt will improve impaired LE MMTs by 1 score to improve strength for functional tasks.  3. Pt to improve TUG to </= 19 seconds in order to decrease patient fall risk   4. Pt to demonstrate stepping over small hurdles with Cane or Min A with good clearance of each LE, x5 trials       Plan     Plan of care Certification: 5/13/2024 to 8/13/2024.    PT/PTA met face to face to discuss pt's treatment plan and progress towards established goals. Pt will be seen by a physical therapist minimally every 6th visit or every 30 days.    I certify that I was present in the room directing the student in service delivery and guiding them using my skilled judgment. As the co-signing therapist I have reviewed the students documentation and am responsible for the treatment, assessment, and plan.   MAURICIO Yoon PTA

## 2024-06-10 ENCOUNTER — OFFICE VISIT (OUTPATIENT)
Dept: INTERNAL MEDICINE | Facility: CLINIC | Age: 84
End: 2024-06-10
Payer: MEDICARE

## 2024-06-10 VITALS
HEIGHT: 65 IN | SYSTOLIC BLOOD PRESSURE: 136 MMHG | OXYGEN SATURATION: 98 % | BODY MASS INDEX: 27.49 KG/M2 | HEART RATE: 90 BPM | TEMPERATURE: 98 F | DIASTOLIC BLOOD PRESSURE: 78 MMHG | RESPIRATION RATE: 16 BRPM | WEIGHT: 165 LBS

## 2024-06-10 DIAGNOSIS — I15.2 HYPERTENSION ASSOCIATED WITH TYPE 2 DIABETES MELLITUS: ICD-10-CM

## 2024-06-10 DIAGNOSIS — M47.812 OSTEOARTHRITIS OF CERVICAL SPINE, UNSPECIFIED SPINAL OSTEOARTHRITIS COMPLICATION STATUS: ICD-10-CM

## 2024-06-10 DIAGNOSIS — F41.9 ANXIETY: ICD-10-CM

## 2024-06-10 DIAGNOSIS — R31.29 HEMATURIA, MICROSCOPIC: ICD-10-CM

## 2024-06-10 DIAGNOSIS — Z00.00 ANNUAL PHYSICAL EXAM: Primary | ICD-10-CM

## 2024-06-10 DIAGNOSIS — E04.1 THYROID NODULE: ICD-10-CM

## 2024-06-10 DIAGNOSIS — E11.59 HYPERTENSION ASSOCIATED WITH TYPE 2 DIABETES MELLITUS: ICD-10-CM

## 2024-06-10 DIAGNOSIS — N41.1 CHRONIC PROSTATITIS: ICD-10-CM

## 2024-06-10 DIAGNOSIS — E11.9 TYPE 2 DIABETES MELLITUS WITHOUT COMPLICATION, WITH LONG-TERM CURRENT USE OF INSULIN: ICD-10-CM

## 2024-06-10 DIAGNOSIS — Z79.4 TYPE 2 DIABETES MELLITUS WITHOUT COMPLICATION, WITH LONG-TERM CURRENT USE OF INSULIN: ICD-10-CM

## 2024-06-10 PROCEDURE — 1160F RVW MEDS BY RX/DR IN RCRD: CPT | Mod: HCNC,CPTII,S$GLB, | Performed by: INTERNAL MEDICINE

## 2024-06-10 PROCEDURE — 3288F FALL RISK ASSESSMENT DOCD: CPT | Mod: HCNC,CPTII,S$GLB, | Performed by: INTERNAL MEDICINE

## 2024-06-10 PROCEDURE — 3075F SYST BP GE 130 - 139MM HG: CPT | Mod: HCNC,CPTII,S$GLB, | Performed by: INTERNAL MEDICINE

## 2024-06-10 PROCEDURE — 99999 PR PBB SHADOW E&M-EST. PATIENT-LVL IV: CPT | Mod: PBBFAC,HCNC,, | Performed by: INTERNAL MEDICINE

## 2024-06-10 PROCEDURE — 1101F PT FALLS ASSESS-DOCD LE1/YR: CPT | Mod: HCNC,CPTII,S$GLB, | Performed by: INTERNAL MEDICINE

## 2024-06-10 PROCEDURE — 1159F MED LIST DOCD IN RCRD: CPT | Mod: HCNC,CPTII,S$GLB, | Performed by: INTERNAL MEDICINE

## 2024-06-10 PROCEDURE — 3078F DIAST BP <80 MM HG: CPT | Mod: HCNC,CPTII,S$GLB, | Performed by: INTERNAL MEDICINE

## 2024-06-10 PROCEDURE — 99397 PER PM REEVAL EST PAT 65+ YR: CPT | Mod: HCNC,S$GLB,, | Performed by: INTERNAL MEDICINE

## 2024-06-10 PROCEDURE — 1125F AMNT PAIN NOTED PAIN PRSNT: CPT | Mod: HCNC,CPTII,S$GLB, | Performed by: INTERNAL MEDICINE

## 2024-06-10 RX ORDER — ALPRAZOLAM 0.5 MG/1
0.5 TABLET ORAL 2 TIMES DAILY PRN
Qty: 60 TABLET | Refills: 1 | Status: SHIPPED | OUTPATIENT
Start: 2024-06-10

## 2024-06-10 NOTE — PROGRESS NOTES
Subjective     Patient ID: Ajit Ayoub is a 84 y.o. male.    Chief Complaint: Annual Exam    HPI  84 y.o. Male here for annual exam.      Vaccines: Influenza (2020); Tetanus (2014); Pneumococcal 23 (2012); Prevnar 13 (2018); Shingrix (2020)  Eye exam: current  Colonoscopy: 2011     Exercise: no  Diet: regular     Past Medical History:  No date: Cataract  No date: Chronic prostatitis  No date: Glaucoma  No date: Hypertension  No date: Nephrolithiasis   No date: Hyperthyroidism      Comment:  pt states he has a fatty tumor  No date: Obesity (BMI 30-39.9)  3/20/2020: Osteoarthritis of cervical spine  10/26/2021: Type 2 diabetes mellitus without complication, with long-  term current use of insulin  No date: Vitamin D insufficiency  Past Surgical History:  Cataract surgery  12/5/2018: CIRCUMCISION; N/A      Comment:  Procedure: CIRCUMCISION;  Surgeon: Renato Flores MD;                 Location: [address];  Service: Urology;                 Laterality: N/A;  1.5 hour  12/5/2018: CYSTOSCOPY; N/A      Comment:  Procedure: CYSTOSCOPY;  Surgeon: Renato Flores MD;                 Location: [address];  Service: Urology;                 Laterality: N/A;  No date: TRANSURETHRAL RESECTION OF PROSTATE  Social History    Socioeconomic History      Marital status: Single    Occupational History      Occupation: Retired      Tobacco Use      Smoking status: Never Smoker      Smokeless tobacco: Never Used    Substance and Sexual Activity      Alcohol use: No      Drug use: No      Sexual activity: Not Currently        Partners: Female     Review of patient's allergies indicates:   -- Contrast media -- Rash   -- Pneumoc 13-lori conj-dip cr(pf) -- Rash  Review of Systems   Constitutional:  Negative for activity change, appetite change, chills, diaphoresis, fatigue, fever and unexpected weight change.   HENT:  Negative for nasal congestion, mouth sores, postnasal drip, rhinorrhea, sinus  pressure/congestion, sneezing, sore throat, trouble swallowing and voice change.    Eyes:  Negative for discharge, itching and visual disturbance.   Respiratory:  Negative for cough, chest tightness, shortness of breath and wheezing.    Cardiovascular:  Negative for chest pain, palpitations and leg swelling.   Gastrointestinal:  Negative for abdominal pain, blood in stool, constipation, diarrhea, nausea and vomiting.   Endocrine: Negative for cold intolerance and heat intolerance.   Genitourinary:  Negative for difficulty urinating, dysuria, flank pain, hematuria and urgency.   Musculoskeletal:  Positive for arthralgias, back pain and neck pain. Negative for myalgias.   Integumentary:  Negative for rash and wound.   Allergic/Immunologic: Negative for environmental allergies and food allergies.   Neurological:  Negative for dizziness, tremors, seizures, syncope, weakness and headaches.   Hematological:  Negative for adenopathy. Does not bruise/bleed easily.   Psychiatric/Behavioral:  Negative for confusion, self-injury, sleep disturbance and suicidal ideas. The patient is nervous/anxious.           Objective     Physical Exam  Constitutional:       General: He is not in acute distress.     Appearance: Normal appearance. He is well-developed. He is not ill-appearing, toxic-appearing or diaphoretic.   HENT:      Head: Normocephalic and atraumatic.      Right Ear: External ear normal.      Left Ear: External ear normal.      Nose: Nose normal.      Mouth/Throat:      Pharynx: No oropharyngeal exudate.   Eyes:      General: No scleral icterus.        Right eye: No discharge.         Left eye: No discharge.      Extraocular Movements: Extraocular movements intact.      Conjunctiva/sclera: Conjunctivae normal.      Pupils: Pupils are equal, round, and reactive to light.   Neck:      Thyroid: No thyromegaly.      Vascular: No JVD.   Cardiovascular:      Rate and Rhythm: Normal rate and regular rhythm.      Pulses: Normal  pulses.      Heart sounds: Normal heart sounds. No murmur heard.  Pulmonary:      Effort: Pulmonary effort is normal. No respiratory distress.      Breath sounds: Normal breath sounds. No wheezing or rales.   Abdominal:      General: Bowel sounds are normal. There is no distension.      Palpations: Abdomen is soft.      Tenderness: There is no abdominal tenderness. There is no right CVA tenderness, left CVA tenderness, guarding or rebound.   Musculoskeletal:      Cervical back: Normal range of motion and neck supple. No rigidity.      Right lower leg: No edema.      Left lower leg: No edema.   Lymphadenopathy:      Cervical: No cervical adenopathy.   Skin:     General: Skin is warm and dry.      Capillary Refill: Capillary refill takes less than 2 seconds.      Coloration: Skin is not pale.      Findings: No rash.   Neurological:      General: No focal deficit present.      Mental Status: He is alert and oriented to person, place, and time. Mental status is at baseline.      Cranial Nerves: No cranial nerve deficit.      Sensory: No sensory deficit.      Motor: No weakness.      Coordination: Coordination normal.      Gait: Gait normal.      Deep Tendon Reflexes: Reflexes normal.   Psychiatric:         Mood and Affect: Mood normal.         Behavior: Behavior normal.         Thought Content: Thought content normal.         Judgment: Judgment normal.            Assessment and Plan     1. Annual physical exam    2. Anxiety    3. Osteoarthritis of cervical spine, unspecified spinal osteoarthritis complication status    4. Hypertension associated with type 2 diabetes mellitus    5. Chronic prostatitis    6. Thyroid nodule    7. Type 2 diabetes mellitus without complication, with long-term current use of insulin    8. Hematuria, microscopic    Other orders  -     ALPRAZolam (XANAX) 0.5 MG tablet; Take 1 tablet (0.5 mg total) by mouth 2 (two) times daily as needed for Anxiety.  Dispense: 60 tablet; Refill: 1         Blood  work reviewed with pt     T2DM- last A1C of 6.6(5/24)<--5.7(12/23)<--5.8(4/23)<--5.6(10/22)<--5.9(4/22)<--6.5(3/21)<--6.0(3/21)      HTN- stable on Micardis       Chronic prostatitis- pt has seen Urology       Anxiety- stable on Lexapro 10 mg daily       Hx of basal cell carcinoma on face- followed by Derm      Thyroid nodule- neg Bx(12/9/19)      hx of nephrolithiasis- stable      DJD Cervical spine/back pain- stable     Hx of gout- stable, followed by Urology     F/u in 6 months

## 2024-06-12 ENCOUNTER — CLINICAL SUPPORT (OUTPATIENT)
Dept: REHABILITATION | Facility: HOSPITAL | Age: 84
End: 2024-06-12
Payer: MEDICARE

## 2024-06-12 DIAGNOSIS — R29.898 WEAKNESS OF BOTH LOWER EXTREMITIES: Primary | ICD-10-CM

## 2024-06-12 DIAGNOSIS — R26.9 GAIT ABNORMALITY: ICD-10-CM

## 2024-06-12 PROCEDURE — 97112 NEUROMUSCULAR REEDUCATION: CPT | Mod: HCNC

## 2024-06-12 PROCEDURE — 97110 THERAPEUTIC EXERCISES: CPT | Mod: HCNC

## 2024-06-12 NOTE — PROGRESS NOTES
[facility]   Physical Therapy Treatment Note   PROGRESS NOTE     Name: Ajit Ayoub  Clinic Number: [id number]    Therapy Diagnosis:   Encounter Diagnoses   Name Primary?    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 He feels like his main challenge is getting out of his recliner (deeper chairs).   Pt has been compliant with HEP.  Response to previous treatment: no adverse effects  Functional change: none at this time     Pain: 0/10  Location: n/a     Objective      2024    LE Strength:    Right Left Pain/Dysfunction with Movement   Hip Flexion 5 4+     Hip Extension 4- 4- Modified in standing   Hip ABD 4+ 4     Hip IR 4+ 4+     Hip ER 4+ 4+     Knee Flexion 5 5     Knee Extension 5 5     Ankle DF 5 5     *Patient able to perform 10, DL calf raises with UE support     Functional Assessment:   Stepping over small hurdles: CGA x5 small hurdles. Step-to pattern leading with each LE. Step-through pattern with Min A and decreased control when attempting to step through with L LE.     TU seconds. SBA for safety, no AD  30 sec STS: x6 reps in 30 seconds      Intake Outcome Measure for FOTO Foot - 2024     Therapist reviewed FOTO  "scores for Ajit Ayoub on 6/12/2024.   FOTO report - see Media section or FOTO account episode details.     Intake Score: 48%         Treatment     Ajit received the treatments listed below:      therapeutic exercises to develop strength, endurance, ROM, and flexibility for 23 minutes including:  Seated clams RTB 2x10   Seated adductor squeeze 3" 2x10   LAQ  2x10   New objective measures taken above    Not today:  Standing hip abd/ext YTB at knees 3x10   Ankle DF with YTB B 2x10  DL Heel Raises 2x10      neuromuscular re-education activities to improve: Balance, Coordination, and Proprioception for 15 minutes. The following activities were included:  Standing marches on airex c/ UE support 2x1min   Alt short cone taps 2x10   Hurdles: (4 small)   - Step to pattern BLE 4 laps  - Step through pattern BLE 4 laps   - Lateral walking 4 laps - not performed    Lateral step overs on airex 10 laps  - not performed  Standing alt mar + step forward, march + step backward 2x10 - not performed       therapeutic activities to improve functional performance for 05 minutes, including:  NuStep 5' (for improved endurance for walking program)     Not today:  STS (21" elevate mat) 2x10 no UE support  SLS on airex 2 x 30" ( c/ UE use)   Step ups on airex 2x10 lakesha     Patient Education and Home Exercises       Education provided:   - HEP    Written Home Exercises Provided: yes. Exercises were reviewed and Ajit was able to demonstrate them prior to the end of the session.  Ajit demonstrated good  understanding of the education provided. See EMR under Patient Instructions for exercises provided during therapy sessions    Assessment   Patient tolerated session well today and is showing good improvements with strengthening and functional mobility. Patient has improved his sit to stand transitions with UE support, however continues to struggle while performing without support on armrests or while seated deeper into chair. Patient " with improvements to TUG score indicating decreased risk of balance. He continues with occasional limitations to stepping over obstacles with L LE at times, however showed much improvement with fluidity today.     Ajit has met 6 goals and will continue to progress to remaining goals. One new goal added at this time.  Pt prognosis is Fair.     Pt will continue to benefit from skilled outpatient physical therapy to address the deficits listed in the problem list box on initial evaluation, provide pt/family education and to maximize pt's level of independence in the home and community environment.     Pt's spiritual, cultural and educational needs considered and pt agreeable to plan of care and goals.     Anticipated barriers to physical therapy: none    Goals:   Short Term Goals (3 Weeks):  1. Pt will be compliant with HEP to supplement PT in decreasing pain with functional mobility. MET, continue  2. Pt will improve impaired LE MMTs by 1/2 score to improve strength for functional tasks. MET  3. Pt will improve 30 second STS to at least 6 reps for improved functioning and daily activities  MET     Long Term Goals (6 Weeks):   1. Pt will improve FOTO score to >/= 55% to decrease perceived limitation with maintaining/changing body position.  NOT MET  2. Pt will improve impaired LE MMTs by 1 score to improve strength for functional tasks. MET  3. Pt to improve TUG to </= 19 seconds in order to decrease patient fall risk   MET  4. Pt to demonstrate stepping over small hurdles with Cane or Min A with good clearance of each LE, x5 trials  MET  NEW GOAL: Pt will be able to perform sit > stand from deep seated chair with UE support on thighs only 3/5 trials with CGA        Plan   Plan of care Certification: 5/13/2024 to 8/13/2024.    Outpatient Physical Therapy 2 times weekly for 6 weeks to include the following interventions: Gait Training, Manual Therapy, Neuromuscular Re-ed, Patient Education, Therapeutic Activities, and  Therapeutic Exercise.     Roseann Gibbons, PT, DPT, Cert. DN  6/12/2024

## 2024-06-13 ENCOUNTER — TELEPHONE (OUTPATIENT)
Dept: INTERNAL MEDICINE | Facility: CLINIC | Age: 84
End: 2024-06-13
Payer: MEDICARE

## 2024-06-13 DIAGNOSIS — E11.59 HYPERTENSION ASSOCIATED WITH TYPE 2 DIABETES MELLITUS: Primary | ICD-10-CM

## 2024-06-13 DIAGNOSIS — Z12.5 ENCOUNTER FOR SCREENING FOR MALIGNANT NEOPLASM OF PROSTATE: ICD-10-CM

## 2024-06-13 DIAGNOSIS — I15.2 HYPERTENSION ASSOCIATED WITH TYPE 2 DIABETES MELLITUS: Primary | ICD-10-CM

## 2024-06-13 DIAGNOSIS — R29.898 UPPER EXTREMITY WEAKNESS: ICD-10-CM

## 2024-06-13 DIAGNOSIS — N41.1 CHRONIC PROSTATITIS: ICD-10-CM

## 2024-06-13 DIAGNOSIS — F41.9 ANXIETY: ICD-10-CM

## 2024-06-13 NOTE — TELEPHONE ENCOUNTER
----- Message from Kari Miller sent at 6/13/2024  9:19 AM CDT -----  Contact: [phone]  type: Lab    Caller is requesting to schedule their Lab appointment prior to annual appointment.  Order is not listed in EPIC.  Please enter order and contact patient to schedule.    Name of Caller: Ajit Roland Date and Time of Labs: morning    Date of Annual Physical Appointment: 12/10/2024    Where would they like the lab performed?[facility]    Would the patient rather a call back or a response via [facility]? call    Best Call Back Number:[phone]     Additional Information:none

## 2024-06-14 ENCOUNTER — CLINICAL SUPPORT (OUTPATIENT)
Dept: REHABILITATION | Facility: HOSPITAL | Age: 84
End: 2024-06-14
Payer: MEDICARE

## 2024-06-14 DIAGNOSIS — R26.9 GAIT ABNORMALITY: ICD-10-CM

## 2024-06-14 DIAGNOSIS — R29.898 WEAKNESS OF BOTH LOWER EXTREMITIES: Primary | ICD-10-CM

## 2024-06-14 PROCEDURE — 97110 THERAPEUTIC EXERCISES: CPT | Mod: HCNC,CQ

## 2024-06-14 PROCEDURE — 97530 THERAPEUTIC ACTIVITIES: CPT | Mod: HCNC,CQ

## 2024-06-14 NOTE — PROGRESS NOTES
"[facility]   Physical Therapy Treatment Note      Name: Ajit SCHMIDT [facility]  Clinic Number: [id number]    Therapy Diagnosis:   Encounter Diagnoses   Name Primary?    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   Pt has been compliant with HEP.  Response to previous treatment: no adverse effects  Functional change: none at this time     Pain: 0/10  Location: n/a     Objective      Objective Measures updated at progress report unless specified.     Treatment     Ajit received the treatments listed below:      therapeutic exercises to develop strength, endurance, ROM, and flexibility for 20 minutes including:  Seated clams RTB 2x10   Seated adductor squeeze 3" 2x10   LAQ iso c/ SB 3" 2x10   HS iso c/ SB 3" 2x10  Standing hip abd/ext YTB at knees 2x10   Ankle DF with YTB B 2x10    DL Heel Raises 2x10 - NT    neuromuscular re-education activities to improve: Balance, Coordination, and Proprioception for 0 minutes. The following activities were included:  Standing marches on airex c/ UE support 2x1min   Alt short cone taps 2x10   Hurdles: (4 small)   - Step to pattern BLE 4 " "laps  - Step through pattern BLE 4 laps   - Lateral walking 4 laps  Lateral step overs on airex 10 laps    Standing alt mar + step forward, march + step backward 2x10    therapeutic activities to improve functional performance for 26 minutes, including:  NuStep 5' (for improved endurance for walking program)   Shuttle DLP 3c 3x10   Touch and go's plyobox 18" c/TRX 3x10  Bridges c/ RTB 3x10    STS (21" elevate mat) 2x10 no UE support - NT  SLS on airex 2 x 30" ( c/ UE use) - NT  Step ups on airex 2x10 lakesha - NT    Patient Education and Home Exercises       Education provided:   - HEP    Written Home Exercises Provided: yes. Exercises were reviewed and Ajit was able to demonstrate them prior to the end of the session.  Ajit demonstrated good  understanding of the education provided. See EMR under Patient Instructions for exercises provided during therapy sessions    Assessment     Pt with good tolerance to shuttle and touch and go activities added into the program today in order to increase LE strength and functional mobility. This included in order to meet new goal of easy transitions from sit to stand from deep chairs. Bridge exercise was added to increase BLE strength with good tolerance from the pt, did reported some increased low back discomfort that was relieved after sitting up. Will continue to monitor pt and progress them towards goals as tolerated.     Pt prognosis is Fair.     Pt will continue to benefit from skilled outpatient physical therapy to address the deficits listed in the problem list box on initial evaluation, provide pt/family education and to maximize pt's level of independence in the home and community environment.     Pt's spiritual, cultural and educational needs considered and pt agreeable to plan of care and goals.     Anticipated barriers to physical therapy: none    Goals:   Short Term Goals (3 Weeks):  1. Pt will be compliant with HEP to supplement PT in decreasing pain with " functional mobility. MET, continue  2. Pt will improve impaired LE MMTs by 1/2 score to improve strength for functional tasks. MET  3. Pt will improve 30 second STS to at least 6 reps for improved functioning and daily activities  MET     Long Term Goals (6 Weeks):   1. Pt will improve FOTO score to >/= 55% to decrease perceived limitation with maintaining/changing body position.  NOT MET  2. Pt will improve impaired LE MMTs by 1 score to improve strength for functional tasks. MET  3. Pt to improve TUG to </= 19 seconds in order to decrease patient fall risk   MET  4. Pt to demonstrate stepping over small hurdles with Cane or Min A with good clearance of each LE, x5 trials  MET  NEW GOAL: Pt will be able to perform sit > stand from deep seated chair with UE support on thighs only 3/5 trials with CGA        Plan   Plan of care Certification: 5/13/2024 to 8/13/2024.    Outpatient Physical Therapy 2 times weekly for 6 weeks to include the following interventions: Gait Training, Manual Therapy, Neuromuscular Re-ed, Patient Education, Therapeutic Activities, and Therapeutic Exercise.     I certify that I was present in the room directing the student in service delivery and guiding them using my skilled judgment. As the co-signing therapist I have reviewed the students documentation and am responsible for the treatment, assessment, and plan.   Christiana Vergara PTA,

## 2024-06-19 ENCOUNTER — CLINICAL SUPPORT (OUTPATIENT)
Dept: REHABILITATION | Facility: HOSPITAL | Age: 84
End: 2024-06-19
Payer: MEDICARE

## 2024-06-19 DIAGNOSIS — R26.9 GAIT ABNORMALITY: ICD-10-CM

## 2024-06-19 DIAGNOSIS — R29.898 WEAKNESS OF BOTH LOWER EXTREMITIES: Primary | ICD-10-CM

## 2024-06-19 PROCEDURE — 97530 THERAPEUTIC ACTIVITIES: CPT | Mod: KX,HCNC

## 2024-06-19 PROCEDURE — 97110 THERAPEUTIC EXERCISES: CPT | Mod: KX,HCNC

## 2024-06-19 PROCEDURE — 97112 NEUROMUSCULAR REEDUCATION: CPT | Mod: KX,HCNC

## 2024-06-19 NOTE — PROGRESS NOTES
"[facility]   Physical Therapy Treatment Note      Name: Ajit SCHMIDT Mega  Clinic Number: [id number]    Therapy Diagnosis:   Encounter Diagnoses   Name Primary?    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  Response to previous treatment: no adverse effects  Functional change: none at this time     Pain: 0/10  Location: n/a     Objective      Objective Measures updated at progress report unless specified.     Treatment     Ajit received the treatments listed below:      therapeutic exercises to develop strength, endurance, ROM, and flexibility for 15 minutes including:  Seated clams RTB 2x10   Seated adductor squeeze 3" 2x10   LAQ iso c/ SB 3" 2x10  NT  HS iso c/ SB 3" 2x10  NT  Standing hip abd/ext RTB at knees 2x10   Ankle DF with YTB B 2x10  NT  DL Heel Raises 2x10     neuromuscular re-education activities to improve: Balance, Coordination, and Proprioception for 15 minutes. The following activities were included:  Standing marches on airex c/ UE support 2x1min   Alt short cone taps 2x10   Hurdles: (4 small)   - Step to pattern BLE 4 laps  - Step through pattern BLE 4 laps   - Lateral walking " "4 laps  Lateral step overs on airex 10 laps    Standing alt mar + step forward, march + step backward 2x10  NT    therapeutic activities to improve functional performance for 15 minutes, including:  NuStep 5' (for improved endurance for walking program)   Shuttle DLP 3c 3x10   Touch and go's plyobox 18" c/TRX 3x10  NT  Bridges c/ RTB 3x10 (not completed)  Step ups on airex 2x10 lakesha  NT  STS from chair without UEs, 2x8    Not today:  STS (21" elevate mat) 2x10 no UE support - NT  SLS on airex 2 x 30" ( c/ UE use) - NT    Patient Education and Home Exercises       Education provided:   - HEP    Written Home Exercises Provided: yes. Exercises were reviewed and Ajit was able to demonstrate them prior to the end of the session.  Ajit demonstrated good  understanding of the education provided. See EMR under Patient Instructions for exercises provided during therapy sessions    Assessment   Pt with good tolerance to therapy today. Patient continues with hesitancy throughout step-through thuan navigation, but able to perform with increased time between steps. Patient able to complete sit to stands without UE support on armrests or knees today for multiple reps.   Attempted to perform bridges, however pt unable to complete more than a few reps reporting pain to low back.     Pt prognosis is Fair.     Pt will continue to benefit from skilled outpatient physical therapy to address the deficits listed in the problem list box on initial evaluation, provide pt/family education and to maximize pt's level of independence in the home and community environment.     Pt's spiritual, cultural and educational needs considered and pt agreeable to plan of care and goals.     Anticipated barriers to physical therapy: none    Goals:   Short Term Goals (3 Weeks):  1. Pt will be compliant with HEP to supplement PT in decreasing pain with functional mobility. MET, continue  2. Pt will improve impaired LE MMTs by 1/2 score to improve " strength for functional tasks. MET  3. Pt will improve 30 second STS to at least 6 reps for improved functioning and daily activities  MET     Long Term Goals (6 Weeks):   1. Pt will improve FOTO score to >/= 55% to decrease perceived limitation with maintaining/changing body position.  NOT MET  2. Pt will improve impaired LE MMTs by 1 score to improve strength for functional tasks. MET  3. Pt to improve TUG to </= 19 seconds in order to decrease patient fall risk   MET  4. Pt to demonstrate stepping over small hurdles with Cane or Min A with good clearance of each LE, x5 trials  MET  NEW GOAL: Pt will be able to perform sit > stand from deep seated chair with UE support on thighs only 3/5 trials with CGA        Plan   Plan of care Certification: 5/13/2024 to 8/13/2024.    Outpatient Physical Therapy 2 times weekly for 6 weeks to include the following interventions: Gait Training, Manual Therapy, Neuromuscular Re-ed, Patient Education, Therapeutic Activities, and Therapeutic Exercise.     Roseann Gibbons, PT, DPT, Cert. DN  6/19/2024

## 2024-06-20 NOTE — PROGRESS NOTES
"[facility]   Physical Therapy Treatment Note      Name: Ajit SCHMIDT [facility]  Clinic Number: [id number]    Therapy Diagnosis:   Encounter Diagnoses   Name Primary?    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 Reports feeling unsteady on L LE, especially when leading and stepping over obstacles with the L.   Pt has been compliant with HEP.  Response to previous treatment: no adverse effects  Functional change: none at this time     Pain: 5/10  Location: neck/low back     Objective      Objective Measures updated at progress report unless specified.     Treatment     Ajit received the treatments listed below:      therapeutic exercises to develop strength, endurance, ROM, and flexibility for 10 minutes including:    LAQ iso c/ SB 3" 2x10   HS iso c/ SB 3" 2x10   DL Heel Raises holding 2# weights 2x10     Standing hip abd/ext RTB at knees 2x10 - NT  Ankle DF with YTB B 2x10  NT  Seated clams RTB 2x10   Seated adductor squeeze 3" 2x10     neuromuscular re-education activities to improve: Balance, Coordination, and Proprioception for 14 minutes. The following activities were " "included:    Standing marches on airex c/ UE support 2x1min   Hurdles: (4 small)   - Step to pattern BLE 4 laps  - Step through pattern BLE 4 laps   - Lateral walking 4 laps    Alt short cone taps 2x10 - NT  Standing alt mar + step forward, march + step backward 2x10  NT  Lateral step overs on airex 10 laps - NT    therapeutic activities to improve functional performance for 23 minutes, including:    NuStep 7' (for improved endurance for walking program)   Shuttle DLP 3c 3x10   Tandem Stance on airex 2x30"   STS from 18" c/o UEs, 2x8   SLS on airex 2 x 30" ( c/ UE use)     Forward Heel taps 2x10 B - NT  Touch and go's plyobox 18" c/TRX - NT    Step ups on airex 2x10 lakesha  - NT  STS (21" elevate mat) 2x10 no UE support - NT  Bridges c/ RTB 3x10 - NT    Patient Education and Home Exercises       Education provided:   - HEP    Written Home Exercises Provided: yes. Exercises were reviewed and Ajit was able to demonstrate them prior to the end of the session.  Ajit demonstrated good  understanding of the education provided. See EMR under Patient Instructions for exercises provided during therapy sessions    Assessment     All exercises planned were not completed secondary to pt fatigue and rest breaks needed between exercises. Pt opted to finish on the NuStep over exercises secondary to feeling a little bit worn out from exercises. Pt with noticeable hesitation and unsteadiness when practicing thuan activities when leading with the left, leading to CGA from the SPTA. Balance activities were focused on today secondary to pt reports of unsteady feeling on feet. Tandem stance balance activity was incorporated to challenge balance with good tolerance from the pt and no episodes of LOB. Will continue to monitor pt and progress them towards goals as tolerated in future sessions.     Pt prognosis is Fair.     Pt will continue to benefit from skilled outpatient physical therapy to address the deficits listed in the problem " list box on initial evaluation, provide pt/family education and to maximize pt's level of independence in the home and community environment.     Pt's spiritual, cultural and educational needs considered and pt agreeable to plan of care and goals.     Anticipated barriers to physical therapy: none    Goals:   Short Term Goals (3 Weeks):  1. Pt will be compliant with HEP to supplement PT in decreasing pain with functional mobility. MET, continue  2. Pt will improve impaired LE MMTs by 1/2 score to improve strength for functional tasks. MET  3. Pt will improve 30 second STS to at least 6 reps for improved functioning and daily activities  MET     Long Term Goals (6 Weeks):   1. Pt will improve FOTO score to >/= 55% to decrease perceived limitation with maintaining/changing body position.  NOT MET  2. Pt will improve impaired LE MMTs by 1 score to improve strength for functional tasks. MET  3. Pt to improve TUG to </= 19 seconds in order to decrease patient fall risk   MET  4. Pt to demonstrate stepping over small hurdles with Cane or Min A with good clearance of each LE, x5 trials  MET  NEW GOAL: Pt will be able to perform sit > stand from deep seated chair with UE support on thighs only 3/5 trials with CGA        Plan   Plan of care Certification: 5/13/2024 to 8/13/2024.    Outpatient Physical Therapy 2 times weekly for 6 weeks to include the following interventions: Gait Training, Manual Therapy, Neuromuscular Re-ed, Patient Education, Therapeutic Activities, and Therapeutic Exercise.     I certify that I was present in the room directing the student in service delivery and guiding them using my skilled judgment. As the co-signing therapist I have reviewed the students documentation and am responsible for the treatment, assessment, and plan.   Christiana Vergara, PTA

## 2024-06-21 ENCOUNTER — CLINICAL SUPPORT (OUTPATIENT)
Dept: REHABILITATION | Facility: HOSPITAL | Age: 84
End: 2024-06-21
Payer: MEDICARE

## 2024-06-21 DIAGNOSIS — R26.9 GAIT ABNORMALITY: ICD-10-CM

## 2024-06-21 DIAGNOSIS — R29.898 WEAKNESS OF BOTH LOWER EXTREMITIES: Primary | ICD-10-CM

## 2024-06-21 PROCEDURE — 97530 THERAPEUTIC ACTIVITIES: CPT | Mod: KX,HCNC,CQ

## 2024-06-21 PROCEDURE — 97112 NEUROMUSCULAR REEDUCATION: CPT | Mod: KX,HCNC,CQ

## 2024-06-26 ENCOUNTER — CLINICAL SUPPORT (OUTPATIENT)
Dept: REHABILITATION | Facility: HOSPITAL | Age: 84
End: 2024-06-26
Payer: MEDICARE

## 2024-06-26 DIAGNOSIS — R29.898 WEAKNESS OF BOTH LOWER EXTREMITIES: Primary | ICD-10-CM

## 2024-06-26 DIAGNOSIS — R26.9 GAIT ABNORMALITY: ICD-10-CM

## 2024-06-26 PROCEDURE — 97530 THERAPEUTIC ACTIVITIES: CPT | Mod: KX,HCNC,CQ

## 2024-06-26 PROCEDURE — 97110 THERAPEUTIC EXERCISES: CPT | Mod: KX,HCNC,CQ

## 2024-06-26 PROCEDURE — 97112 NEUROMUSCULAR REEDUCATION: CPT | Mod: KX,HCNC,CQ

## 2024-06-26 NOTE — PROGRESS NOTES
"[facility]   Physical Therapy Treatment Note      Name: Ajit SCHMIDT [facility]  Clinic Number: [id number]    Therapy Diagnosis:   Encounter Diagnoses   Name Primary?    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 Pt states that he will likely return to the gym next week and plans for Friday to be his last day.   Pt has been compliant with HEP.  Response to previous treatment: no adverse effects  Functional change: none at this time     Pain: 5/10  Location: neck/low back     Objective      Objective Measures updated at progress report unless specified.     Treatment     Ajit received the treatments listed below:      therapeutic exercises to develop strength, endurance, ROM, and flexibility for 15 minutes including:    LAQ iso c/ SB 3" 2x10   HS iso c/ SB 3" 2x10   DL Heel Raises holding 2# weights 2x10   Standing hip abd/ext RTB at knees 2x10   Seated clams GTB 2x10   Seated adductor squeeze 3" 2x10     Ankle DF with YTB B 2x10  NT      neuromuscular re-education activities to improve: Balance, Coordination, and Proprioception for 10 minutes. The following activities were included:    Standing marches on " "airex c/ UE support 2x1min   Hurdles: (4 small)   - Step to pattern BLE 4 laps  - Step through pattern BLE 4 laps   - Lateral walking 4 laps    Alt short cone taps 2x10 - NT  Standing alt mar + step forward, march + step backward 2x10  NT  Lateral step overs on airex 10 laps - NT    therapeutic activities to improve functional performance for 20 minutes, including:    NuStep 7' (for improved endurance for walking program)   Shuttle DLP 3c 3x10   Tandem Stance on airex 2x30"   STS from 18" c/o UEs, 2x10  SLS on airex 2 x 30" ( c/ UE use)     Forward Heel taps 2x10 B - NT  Touch and go's plyobox 18" c/TRX - NT    Step ups on airex 2x10 lakesha  - NT  STS (21" elevate mat) 2x10 no UE support - NT  Bridges c/ RTB 3x10 - NT    Patient Education and Home Exercises       Education provided:   - HEP    Written Home Exercises Provided: yes. Exercises were reviewed and Ajit was able to demonstrate them prior to the end of the session.  Ajit demonstrated good  understanding of the education provided. See EMR under Patient Instructions for exercises provided during therapy sessions    Assessment     Pt exhibited tolerance to all activities performed today, and while he required SBA during static and dynamic NMR and TA, no LOB noted. Pt with use of occasional UE support on mat, however no more than finger tips used and no need for constant tactile assistance. Plan to discharge Friday.      Pt prognosis is Fair.     Pt will continue to benefit from skilled outpatient physical therapy to address the deficits listed in the problem list box on initial evaluation, provide pt/family education and to maximize pt's level of independence in the home and community environment.     Pt's spiritual, cultural and educational needs considered and pt agreeable to plan of care and goals.     Anticipated barriers to physical therapy: none    Goals:   Short Term Goals (3 Weeks):  1. Pt will be compliant with HEP to supplement PT in decreasing pain " with functional mobility. MET, continue  2. Pt will improve impaired LE MMTs by 1/2 score to improve strength for functional tasks. MET  3. Pt will improve 30 second STS to at least 6 reps for improved functioning and daily activities  MET     Long Term Goals (6 Weeks):   1. Pt will improve FOTO score to >/= 55% to decrease perceived limitation with maintaining/changing body position.  NOT MET  2. Pt will improve impaired LE MMTs by 1 score to improve strength for functional tasks. MET  3. Pt to improve TUG to </= 19 seconds in order to decrease patient fall risk   MET  4. Pt to demonstrate stepping over small hurdles with Cane or Min A with good clearance of each LE, x5 trials  MET  NEW GOAL: Pt will be able to perform sit > stand from deep seated chair with UE support on thighs only 3/5 trials with CGA        Plan   Plan of care Certification: 5/13/2024 to 8/13/2024.    Outpatient Physical Therapy 2 times weekly for 6 weeks to include the following interventions: Gait Training, Manual Therapy, Neuromuscular Re-ed, Patient Education, Therapeutic Activities, and Therapeutic Exercise.       Christiana Vergara, PTA

## 2024-06-27 NOTE — PROGRESS NOTES
"[facility]   Physical Therapy Treatment Note      Name: Ajit SCHMIDT Mega  Clinic Number: [id number]    Therapy Diagnosis:   No diagnosis found.        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 Pt states that he will likely return to the gym next week and plans for Friday to be his last day.   Pt has been compliant with HEP.  Response to previous treatment: no adverse effects  Functional change: none at this time     Pain: 5/10  Location: neck/low back     Objective      Objective Measures updated at progress report unless specified.     Treatment     Ajit received the treatments listed below:      therapeutic exercises to develop strength, endurance, ROM, and flexibility for *** minutes including:    LAQ iso c/ SB 3" 2x10   HS iso c/ SB 3" 2x10   DL Heel Raises holding 2# weights 2x10   Standing hip abd/ext RTB at knees 2x10   Seated clams GTB 2x10   Seated adductor squeeze 3" 2x10     Ankle DF with YTB B 2x10  NT      neuromuscular re-education activities to improve: Balance, Coordination, and Proprioception for *** minutes. The following activities were included:    Standing marches on airex c/ UE support 2x1min   Hurdles: (4 small)   - Step to pattern BLE 4 " "laps  - Step through pattern BLE 4 laps   - Lateral walking 4 laps    Alt short cone taps 2x10 - NT  Standing alt mar + step forward, march + step backward 2x10  NT  Lateral step overs on airex 10 laps - NT    therapeutic activities to improve functional performance for *** minutes, including:    NuStep 7' (for improved endurance for walking program)   Shuttle DLP 3c 3x10   Tandem Stance on airex 2x30"   STS from 18" c/o UEs, 2x10  SLS on airex 2 x 30" ( c/ UE use)     Forward Heel taps 2x10 B - NT  Touch and go's plyobox 18" c/TRX - NT    Step ups on airex 2x10 lakesha  - NT  STS (21" elevate mat) 2x10 no UE support - NT  Bridges c/ RTB 3x10 - NT    Patient Education and Home Exercises       Education provided:   - HEP    Written Home Exercises Provided: yes. Exercises were reviewed and Ajit was able to demonstrate them prior to the end of the session.  Ajit demonstrated good  understanding of the education provided. See EMR under Patient Instructions for exercises provided during therapy sessions    Assessment     *** Pt exhibited tolerance to all activities performed today, and while he required SBA during static and dynamic NMR and TA, no LOB noted. Pt with use of occasional UE support on mat, however no more than finger tips used and no need for constant tactile assistance. Plan to discharge Friday.      Pt prognosis is Fair.     Pt will continue to benefit from skilled outpatient physical therapy to address the deficits listed in the problem list box on initial evaluation, provide pt/family education and to maximize pt's level of independence in the home and community environment.     Pt's spiritual, cultural and educational needs considered and pt agreeable to plan of care and goals.     Anticipated barriers to physical therapy: none    Goals:   Short Term Goals (3 Weeks):  1. Pt will be compliant with HEP to supplement PT in decreasing pain with functional mobility. MET, continue  2. Pt will improve impaired " LE MMTs by 1/2 score to improve strength for functional tasks. MET  3. Pt will improve 30 second STS to at least 6 reps for improved functioning and daily activities  MET     Long Term Goals (6 Weeks):   1. Pt will improve FOTO score to >/= 55% to decrease perceived limitation with maintaining/changing body position.  NOT MET  2. Pt will improve impaired LE MMTs by 1 score to improve strength for functional tasks. MET  3. Pt to improve TUG to </= 19 seconds in order to decrease patient fall risk   MET  4. Pt to demonstrate stepping over small hurdles with Cane or Min A with good clearance of each LE, x5 trials  MET  NEW GOAL: Pt will be able to perform sit > stand from deep seated chair with UE support on thighs only 3/5 trials with CGA        Plan   Plan of care Certification: 5/13/2024 to 8/13/2024.    Outpatient Physical Therapy 2 times weekly for 6 weeks to include the following interventions: Gait Training, Manual Therapy, Neuromuscular Re-ed, Patient Education, Therapeutic Activities, and Therapeutic Exercise.       MAURICIO Yoon

## 2024-06-28 ENCOUNTER — CLINICAL SUPPORT (OUTPATIENT)
Dept: REHABILITATION | Facility: HOSPITAL | Age: 84
End: 2024-06-28
Payer: MEDICARE

## 2024-06-28 DIAGNOSIS — R29.898 WEAKNESS OF BOTH LOWER EXTREMITIES: Primary | ICD-10-CM

## 2024-06-28 DIAGNOSIS — R26.9 GAIT ABNORMALITY: ICD-10-CM

## 2024-06-28 PROCEDURE — 97110 THERAPEUTIC EXERCISES: CPT | Mod: KX,HCNC,CQ

## 2024-06-28 PROCEDURE — 97112 NEUROMUSCULAR REEDUCATION: CPT | Mod: KX,HCNC,CQ

## 2024-06-28 PROCEDURE — 97530 THERAPEUTIC ACTIVITIES: CPT | Mod: KX,HCNC,CQ

## 2024-06-28 NOTE — PROGRESS NOTES
"[facility]   Physical Therapy Treatment Note      Name: Ajit Norrisfer  Clinic Number: [id number]    Therapy Diagnosis:   Encounter Diagnoses   Name Primary?    Weakness of both lower extremities Yes    Gait abnormality            Physician: Preston Phillip*  Visit Date: 6/28/2024    Physician Orders: PT Eval and Treat   Medical Diagnosis from Referral:   M21.962 (ICD-10-CM) - Acquired deformity of left foot   M47.816 (ICD-10-CM) - Spondylosis of lumbar region without myelopathy or radiculopathy   R29.6 (ICD-10-CM) - Recurrent falls   R26.9 (ICD-10-CM) - Gait abnormality      Evaluation Date: 5/13/2024  Authorization Period Expiration: 12/31/24  Plan of Care Expiration: 8/13/2024  Progress Note Due: 6/13/2024     Visit # / Visits authorized: 1/1;  12/20  FOTO: 2/3     Precautions: Standard and Fall      PTA Visit #: 2/5     Time In: 1:41 pm   Time Out: 2:26 pm   Total Billable Time: 38 minutes (7 minutes not billed d/t FOTO completion)     Subjective     Pt reports: that he is planning on going back to the gym starting next week.   Pt has been compliant with HEP.  Response to previous treatment: no adverse effects  Functional change: none at this time     Pain: 5/10  Location: neck/low back     Objective      STS from low/deep mat: 5x no UE needed     Treatment     Ajit received the treatments listed below:      therapeutic exercises to develop strength, endurance, ROM, and flexibility for 10 minutes including:  Objective measures as above  DL Heel Raises holding 2# weights 2x10   Standing hip abd/ext RTB at knees 2x10   Seated clams GTB 2x10   Seated adductor squeeze 3" 2x10       LAQ iso c/ SB 3" 2x10 (not performed)   HS iso c/ SB 3" 2x10 (not performed)   Ankle DF with YTB B 2x10  NT      neuromuscular re-education activities to improve: Balance, Coordination, and Proprioception for 10 minutes. The following activities were included:  Hurdles: (4 small)   - Step to pattern " "BLE 4 laps  - Step through pattern BLE 4 laps   - Lateral walking 4 laps      Standing marches on airex c/ UE support 2x1min   Alt short cone taps 2x10 - NT  Standing alt mar + step forward, march + step backward 2x10  NT  Lateral step overs on airex 10 laps - NT    therapeutic activities to improve functional performance for 18 minutes, including:    NuStep 7' (for improved endurance for walking program)   Shuttle DLP 3c 3x10   Tandem Stance on airex 2x30"   SLS on airex 2 x 30" ( c/ UE use)       STS from 18" c/o UEs, 2x10 (not performed)   Forward Heel taps 2x10 B - NT  Touch and go's plyobox 18" c/TRX - NT    Step ups on airex 2x10 lakesha  - NT  STS (21" elevate mat) 2x10 no UE support - NT  Bridges c/ RTB 3x10 - NT    Patient Education and Home Exercises       Education provided:   - HEP    Written Home Exercises Provided: yes. Exercises were reviewed and Ajit was able to demonstrate them prior to the end of the session.  Ajit demonstrated good  understanding of the education provided. See EMR under Patient Instructions for exercises provided during therapy sessions    Assessment     Pt to discharge today. Pt given updated FOTO with improvements noted compared to eval and status. Issued progressed resistance band for home in order to continue with increased strength with HEP. Please see last note from PT for updates goals and measures; as well as formal discharge summary to be performed by PT at later date.     Pt prognosis is Fair.     Pt will continue to benefit from skilled outpatient physical therapy to address the deficits listed in the problem list box on initial evaluation, provide pt/family education and to maximize pt's level of independence in the home and community environment.     Pt's spiritual, cultural and educational needs considered and pt agreeable to plan of care and goals.     Anticipated barriers to physical therapy: none    Goals:   Short Term Goals (3 Weeks):  1. Pt will be compliant " with HEP to supplement PT in decreasing pain with functional mobility. MET, continue  2. Pt will improve impaired LE MMTs by 1/2 score to improve strength for functional tasks. MET  3. Pt will improve 30 second STS to at least 6 reps for improved functioning and daily activities  MET     Long Term Goals (6 Weeks):   1. Pt will improve FOTO score to >/= 55% to decrease perceived limitation with maintaining/changing body position.  NOT MET  2. Pt will improve impaired LE MMTs by 1 score to improve strength for functional tasks. MET  3. Pt to improve TUG to </= 19 seconds in order to decrease patient fall risk   MET  4. Pt to demonstrate stepping over small hurdles with Cane or Min A with good clearance of each LE, x5 trials  MET  NEW GOAL: Pt will be able to perform sit > stand from deep seated chair with UE support on thighs only 3/5 trials with CGA MET (5/5 times)        Plan   Plan of care Certification: 5/13/2024 to 8/13/2024.    Outpatient Physical Therapy 2 times weekly for 6 weeks to include the following interventions: Gait Training, Manual Therapy, Neuromuscular Re-ed, Patient Education, Therapeutic Activities, and Therapeutic Exercise.       Christiana Vergara, PTA

## 2024-12-03 ENCOUNTER — LAB VISIT (OUTPATIENT)
Dept: LAB | Facility: HOSPITAL | Age: 84
End: 2024-12-03
Attending: INTERNAL MEDICINE
Payer: MEDICARE

## 2024-12-03 DIAGNOSIS — R29.898 UPPER EXTREMITY WEAKNESS: ICD-10-CM

## 2024-12-03 DIAGNOSIS — E11.59 HYPERTENSION ASSOCIATED WITH TYPE 2 DIABETES MELLITUS: ICD-10-CM

## 2024-12-03 DIAGNOSIS — I15.2 HYPERTENSION ASSOCIATED WITH TYPE 2 DIABETES MELLITUS: ICD-10-CM

## 2024-12-03 DIAGNOSIS — F41.9 ANXIETY: ICD-10-CM

## 2024-12-03 LAB
AMORPH CRY UR QL COMP ASSIST: ABNORMAL
BILIRUB UR QL STRIP: NEGATIVE
CLARITY UR REFRACT.AUTO: ABNORMAL
COLOR UR AUTO: ABNORMAL
GLUCOSE UR QL STRIP: NEGATIVE
HGB UR QL STRIP: ABNORMAL
KETONES UR QL STRIP: ABNORMAL
LEUKOCYTE ESTERASE UR QL STRIP: NEGATIVE
MICROSCOPIC COMMENT: ABNORMAL
NITRITE UR QL STRIP: NEGATIVE
PH UR STRIP: 6 [PH] (ref 5–8)
PROT UR QL STRIP: ABNORMAL
RBC #/AREA URNS AUTO: 9 /HPF (ref 0–4)
SP GR UR STRIP: 1.02 (ref 1–1.03)
URN SPEC COLLECT METH UR: ABNORMAL

## 2024-12-03 PROCEDURE — 81001 URINALYSIS AUTO W/SCOPE: CPT | Mod: HCNC | Performed by: INTERNAL MEDICINE

## 2024-12-18 ENCOUNTER — OFFICE VISIT (OUTPATIENT)
Dept: INTERNAL MEDICINE | Facility: CLINIC | Age: 84
End: 2024-12-18
Payer: MEDICARE

## 2024-12-18 VITALS
OXYGEN SATURATION: 96 % | HEIGHT: 65 IN | HEART RATE: 92 BPM | WEIGHT: 161 LBS | RESPIRATION RATE: 16 BRPM | SYSTOLIC BLOOD PRESSURE: 132 MMHG | BODY MASS INDEX: 26.82 KG/M2 | TEMPERATURE: 98 F | DIASTOLIC BLOOD PRESSURE: 84 MMHG

## 2024-12-18 DIAGNOSIS — I83.93 VARICOSE VEINS OF BOTH LOWER EXTREMITIES WITHOUT ULCER OR INFLAMMATION: ICD-10-CM

## 2024-12-18 DIAGNOSIS — R73.03 PREDIABETES: ICD-10-CM

## 2024-12-18 DIAGNOSIS — I10 HYPERTENSION, UNSPECIFIED TYPE: Primary | ICD-10-CM

## 2024-12-18 DIAGNOSIS — R26.81 GAIT INSTABILITY: ICD-10-CM

## 2024-12-18 PROCEDURE — 3079F DIAST BP 80-89 MM HG: CPT | Mod: HCNC,CPTII,S$GLB, | Performed by: NURSE PRACTITIONER

## 2024-12-18 PROCEDURE — 99214 OFFICE O/P EST MOD 30 MIN: CPT | Mod: HCNC,S$GLB,, | Performed by: NURSE PRACTITIONER

## 2024-12-18 PROCEDURE — 1126F AMNT PAIN NOTED NONE PRSNT: CPT | Mod: HCNC,CPTII,S$GLB, | Performed by: NURSE PRACTITIONER

## 2024-12-18 PROCEDURE — 3075F SYST BP GE 130 - 139MM HG: CPT | Mod: HCNC,CPTII,S$GLB, | Performed by: NURSE PRACTITIONER

## 2024-12-18 PROCEDURE — 1160F RVW MEDS BY RX/DR IN RCRD: CPT | Mod: HCNC,CPTII,S$GLB, | Performed by: NURSE PRACTITIONER

## 2024-12-18 PROCEDURE — 1159F MED LIST DOCD IN RCRD: CPT | Mod: HCNC,CPTII,S$GLB, | Performed by: NURSE PRACTITIONER

## 2024-12-18 PROCEDURE — 99999 PR PBB SHADOW E&M-EST. PATIENT-LVL IV: CPT | Mod: PBBFAC,HCNC,, | Performed by: NURSE PRACTITIONER

## 2024-12-18 NOTE — PROGRESS NOTES
Subjective:     PCP: Homer Sen DO Rodney BRAYAN Ayoub is a 84 y.o. male who presents for a bi- annual follow up.     Medical History:   Past Medical History:   Diagnosis Date    Cataract     Chronic prostatitis     Glaucoma     Hypertension     Hyperthyroidism     pt states he has a fatty tumor    Obesity (BMI 30-39.9)     Osteoarthritis of cervical spine 3/20/2020    Type 2 diabetes mellitus without complication, with long-term current use of insulin 10/26/2021    Vitamin D insufficiency        Family History: family history includes Diabetes in his father and mother; Heart disease in his father and mother; Stroke in his paternal grandfather.    Surgical History:   Past Surgical History:   Procedure Laterality Date    CIRCUMCISION N/A 12/5/2018    Procedure: CIRCUMCISION;  Surgeon: Renato Flores MD;  Location: [facility] OR [address] [place] [address];  Service: Urology;  Laterality: N/A;  1.5 hour    CYSTOSCOPY N/A 12/5/2018    Procedure: CYSTOSCOPY;  Surgeon: Renato Flores MD;  Location: [facility] OR [address] [place] [address];  Service: Urology;  Laterality: N/A;    TRANSURETHRAL RESECTION OF PROSTATE          Social History:  reports that he has never smoked. He has never used smokeless tobacco. He reports that he does not drink alcohol and does not use drugs.     Allergies:   Review of patient's allergies indicates:   Allergen Reactions    Contrast media Rash    Pneumoc 13-lori conj-dip cr(pf) Rash       Medications:   Current Outpatient Medications   Medication Sig    ALPRAZolam (XANAX) 0.5 MG tablet Take 1 tablet (0.5 mg total) by mouth 2 (two) times daily as needed for Anxiety.    cephALEXin (KEFLEX) 500 MG capsule Take 500 mg by mouth 4 (four) times daily.    clotrimazole (LOTRIMIN) 1 % cream Apply topically 2 (two) times daily. Apply to the affected area twice a day    colchicine (COLCRYS) 0.6 mg tablet 0.6 mg every other day    EScitalopram oxalate (LEXAPRO) 10 MG tablet TAKE 1 TABLET(10 MG) BY MOUTH EVERY EVENING    latanoprost 0.005  "% ophthalmic solution Place 1 drop into both eyes every evening.    LOTEMAX SM 0.38 % DrpG Place 1 drop into the left eye 3 (three) times daily.    LUMIGAN 0.01 % Drop     MELATONIN ORAL Take by mouth.    multivitamin (THERAGRAN) per tablet Take 1 tablet by mouth once daily.    nabumetone (RELAFEN) 750 MG tablet TAKE 1 TABLET BY MOUTH TWICE DAILY AS NEEDED FOR NECK PAIN    neomycin-polymyxin-dexamethasone (DEXACINE) 3.5 mg/g-10,000 unit/g-0.1 % Oint     predniSONE (DELTASONE) 10 MG tablet Prednisone 40 mg daily for a week 30 mg daily for a week 20 mg daily for a week 10 mg daily for a week and stop    telmisartan (MICARDIS) 40 MG Tab TAKE 1 TABLET(40 MG) BY MOUTH EVERY DAY    triamcinolone acetonide 0.1% (KENALOG) 0.1 % Lotn APPLY ONCE TO TWICE DAILY TO LEGS    vitamin D (VITAMIN D3) 1000 units Tab Take 1,000 Units by mouth once daily.     No current facility-administered medications for this visit.       Health Maintenance:   Health Maintenance Topics with due status: Not Due       Topic Last Completion Date    TETANUS VACCINE 10/22/2019    Eye Exam 05/14/2024    Lipid Panel 12/03/2024    Hemoglobin A1c 12/03/2024     No results found for: "HEPCAB", "[id number]"  Eye Exam:     Dental Exam:   OB/GYN: No data on file.   Pap smear: No result found  Mammogram: [unfilled]    Colonoscopy:     FitKit:    Cologuard:   HIV:   Hepatitic C  Ab:     Vaccinations:  Immunization History   Administered Date(s) Administered    COVID-19, MRNA, LN-S, PF (Pfizer) (Purple Cap) 02/26/2021, 03/19/2021, 11/11/2021    Influenza (FLUAD) - Quadrivalent - Adjuvanted - PF *Preferred* (65+) 10/10/2020, 10/26/2021, 12/07/2023    Influenza - Quadrivalent - High Dose - PF (65 years and older) 10/20/2022    Influenza - Quadrivalent - PF *Preferred* (6 months and older) 09/28/2009, 10/18/2010, 10/18/2011, 09/24/2014, 09/29/2015    Influenza - Trivalent - Fluad - Adjuvanted - PF (65 years and older 09/14/2018, 09/27/2019    Influenza - Trivalent " - Fluzone High Dose - PF (65 years and older) 08/29/2013, 10/03/2016, 10/17/2017    Pneumococcal Conjugate - 13 Valent 02/02/2018    Pneumococcal Polysaccharide - 23 Valent 06/27/2012    Tdap 10/22/2019    Zoster Recombinant 08/12/2019, 08/15/2019, 10/21/2019     Influenza:   Tetanus:   Shingrix:   Pneumovax:   Prevnar-13:   Covid vaccine:    Body mass index is 26.79 kg/m².  Wt Readings from Last 3 Encounters:   12/18/24 73 kg (161 lb)   06/10/24 74.8 kg (165 lb)   04/29/24 74 kg (163 lb 2.3 oz)       Review of Systems   All other systems reviewed and are negative.         Objective:     Physical Exam  Vitals reviewed.   Constitutional:       Appearance: Normal appearance.   HENT:      Head: Normocephalic and atraumatic.      Nose: Nose normal.      Mouth/Throat:      Mouth: Mucous membranes are moist.   Eyes:      Pupils: Pupils are equal, round, and reactive to light.   Cardiovascular:      Rate and Rhythm: Normal rate and regular rhythm.      Heart sounds: Normal heart sounds.      Comments: Generalized edema at top of   Pulmonary:      Effort: Pulmonary effort is normal.      Breath sounds: Normal breath sounds.   Abdominal:      General: Bowel sounds are normal.      Palpations: Abdomen is soft.   Musculoskeletal:         General: Normal range of motion.      Cervical back: Normal range of motion.      Right lower leg: Edema present.      Left lower leg: Edema present.   Skin:     General: Skin is warm and dry.   Neurological:      General: No focal deficit present.      Mental Status: He is alert and oriented to person, place, and time.   Psychiatric:         Mood and Affect: Mood normal.         Behavior: Behavior normal.          Assessment:      1. Hypertension, unspecified type    2. Gait instability    3. 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 Prediabetes         Plan:   1. Hypertension, unspecified type  -Dicussed labs  -Chronic, stable  -Continue with telmisartan 40  mg daily    2. Gait instability  -Improving  -Continue with exercises as learnt in PT and continue with walking.     3. 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 Prediabetes  -Recent HgA1c reduced to 5.5% which is not longer in the prediabetic range  -Continue with current diet and lifestyle changes along with exercise to maintain.   -No need for metformin at this time.     RTC in 6 months for follow-up or sooner if needed    __________________________

## 2025-01-14 DIAGNOSIS — F41.9 ANXIETY: Primary | ICD-10-CM

## 2025-01-14 NOTE — TELEPHONE ENCOUNTER
No care due was identified.  Health [facility] Embedded Care Due Messages. Reference number: [id number].   1/14/2025 12:54:46 PM CST

## 2025-01-15 RX ORDER — ALPRAZOLAM 0.5 MG/1
0.5 TABLET ORAL 2 TIMES DAILY PRN
Qty: 60 TABLET | Refills: 1 | Status: SHIPPED | OUTPATIENT
Start: 2025-01-15

## 2025-01-15 NOTE — TELEPHONE ENCOUNTER
----- Message from Erin sent at 1/15/2025  9:02 AM CST -----  Contact: [phone]  Requesting an RX refill or new RX.    Is this a refill or new RX: refill    RX name and strength   ALPRAZolam (XANAX) 0.5 MG tablet 60 tablet 1    Is this a 30 day or 90 day RX:     Pharmacy name and phone #   [id number] [place] [address] [address] AT [facility] [address]   Phone: [phone]  Fax: [phone]          The doctors have asked that we provide their patients with the following 2 reminders -- prescription refills can take up to 72 hours, and a friendly reminder that in the future you can use your [facility] account to request refills: Patient is out of this medication

## 2025-02-22 DIAGNOSIS — Z00.00 ENCOUNTER FOR MEDICARE ANNUAL WELLNESS EXAM: ICD-10-CM

## 2025-05-06 DIAGNOSIS — F41.9 ANXIETY: ICD-10-CM

## 2025-05-06 DIAGNOSIS — E11.59 HYPERTENSION ASSOCIATED WITH TYPE 2 DIABETES MELLITUS: ICD-10-CM

## 2025-05-06 DIAGNOSIS — R29.898 UPPER EXTREMITY WEAKNESS: ICD-10-CM

## 2025-05-06 DIAGNOSIS — I15.2 HYPERTENSION ASSOCIATED WITH TYPE 2 DIABETES MELLITUS: ICD-10-CM

## 2025-06-11 ENCOUNTER — LAB VISIT (OUTPATIENT)
Dept: LAB | Facility: HOSPITAL | Age: 85
End: 2025-06-11
Attending: INTERNAL MEDICINE
Payer: MEDICARE

## 2025-06-11 DIAGNOSIS — I15.2 HYPERTENSION ASSOCIATED WITH TYPE 2 DIABETES MELLITUS: ICD-10-CM

## 2025-06-11 DIAGNOSIS — R29.898 UPPER EXTREMITY WEAKNESS: ICD-10-CM

## 2025-06-11 DIAGNOSIS — E11.59 HYPERTENSION ASSOCIATED WITH TYPE 2 DIABETES MELLITUS: ICD-10-CM

## 2025-06-11 DIAGNOSIS — F41.9 ANXIETY: ICD-10-CM

## 2025-06-11 LAB
ABSOLUTE EOSINOPHIL (OHS): 0.27 K/UL
ABSOLUTE MONOCYTE (OHS): 0.61 K/UL (ref 0.3–1)
ABSOLUTE NEUTROPHIL COUNT (OHS): 6.3 K/UL (ref 1.8–7.7)
ALBUMIN SERPL BCP-MCNC: 4 G/DL (ref 3.5–5.2)
ALP SERPL-CCNC: 83 UNIT/L (ref 40–150)
ALT SERPL W/O P-5'-P-CCNC: 13 UNIT/L (ref 10–44)
ANION GAP (OHS): 10 MMOL/L (ref 8–16)
AST SERPL-CCNC: 23 UNIT/L (ref 11–45)
BASOPHILS # BLD AUTO: 0.05 K/UL
BASOPHILS NFR BLD AUTO: 0.6 %
BILIRUB SERPL-MCNC: 0.6 MG/DL (ref 0.1–1)
BUN SERPL-MCNC: 18 MG/DL (ref 8–23)
CALCIUM SERPL-MCNC: 9.6 MG/DL (ref 8.7–10.5)
CHLORIDE SERPL-SCNC: 102 MMOL/L (ref 95–110)
CO2 SERPL-SCNC: 28 MMOL/L (ref 23–29)
CREAT SERPL-MCNC: 0.8 MG/DL (ref 0.5–1.4)
EAG (OHS): 114 MG/DL (ref 68–131)
ERYTHROCYTE [DISTWIDTH] IN BLOOD BY AUTOMATED COUNT: 12.1 % (ref 11.5–14.5)
GFR SERPLBLD CREATININE-BSD FMLA CKD-EPI: >60 ML/MIN/1.73/M2
GLUCOSE SERPL-MCNC: 127 MG/DL (ref 70–110)
HBA1C MFR BLD: 5.6 % (ref 4–5.6)
HCT VFR BLD AUTO: 44.8 % (ref 40–54)
HGB BLD-MCNC: 14.7 GM/DL (ref 14–18)
IMM GRANULOCYTES # BLD AUTO: 0.02 K/UL (ref 0–0.04)
IMM GRANULOCYTES NFR BLD AUTO: 0.2 % (ref 0–0.5)
LYMPHOCYTES # BLD AUTO: 1.52 K/UL (ref 1–4.8)
MCH RBC QN AUTO: 32.7 PG (ref 27–31)
MCHC RBC AUTO-ENTMCNC: 32.8 G/DL (ref 32–36)
MCV RBC AUTO: 100 FL (ref 82–98)
NUCLEATED RBC (/100WBC) (OHS): 0 /100 WBC
PLATELET # BLD AUTO: 200 K/UL (ref 150–450)
PMV BLD AUTO: 10 FL (ref 9.2–12.9)
POTASSIUM SERPL-SCNC: 4 MMOL/L (ref 3.5–5.1)
PROT SERPL-MCNC: 6.9 GM/DL (ref 6–8.4)
RBC # BLD AUTO: 4.49 M/UL (ref 4.6–6.2)
RELATIVE EOSINOPHIL (OHS): 3.1 %
RELATIVE LYMPHOCYTE (OHS): 17.3 % (ref 18–48)
RELATIVE MONOCYTE (OHS): 7 % (ref 4–15)
RELATIVE NEUTROPHIL (OHS): 71.8 % (ref 38–73)
SODIUM SERPL-SCNC: 140 MMOL/L (ref 136–145)
WBC # BLD AUTO: 8.77 K/UL (ref 3.9–12.7)

## 2025-06-11 PROCEDURE — 82374 ASSAY BLOOD CARBON DIOXIDE: CPT

## 2025-06-11 PROCEDURE — 36415 COLL VENOUS BLD VENIPUNCTURE: CPT

## 2025-06-11 PROCEDURE — 83036 HEMOGLOBIN GLYCOSYLATED A1C: CPT

## 2025-06-11 PROCEDURE — 85025 COMPLETE CBC W/AUTO DIFF WBC: CPT

## 2025-06-18 ENCOUNTER — OFFICE VISIT (OUTPATIENT)
Dept: INTERNAL MEDICINE | Facility: CLINIC | Age: 85
End: 2025-06-18
Payer: MEDICARE

## 2025-06-18 VITALS
HEIGHT: 65 IN | BODY MASS INDEX: 27.51 KG/M2 | SYSTOLIC BLOOD PRESSURE: 132 MMHG | WEIGHT: 165.13 LBS | TEMPERATURE: 98 F | DIASTOLIC BLOOD PRESSURE: 72 MMHG | RESPIRATION RATE: 18 BRPM

## 2025-06-18 DIAGNOSIS — I15.2 HYPERTENSION ASSOCIATED WITH TYPE 2 DIABETES MELLITUS: ICD-10-CM

## 2025-06-18 DIAGNOSIS — F41.9 ANXIETY: ICD-10-CM

## 2025-06-18 DIAGNOSIS — M47.812 OSTEOARTHRITIS OF CERVICAL SPINE, UNSPECIFIED SPINAL OSTEOARTHRITIS COMPLICATION STATUS: ICD-10-CM

## 2025-06-18 DIAGNOSIS — Z12.5 PROSTATE CANCER SCREENING: ICD-10-CM

## 2025-06-18 DIAGNOSIS — Z79.4 TYPE 2 DIABETES MELLITUS WITHOUT COMPLICATION, WITH LONG-TERM CURRENT USE OF INSULIN: ICD-10-CM

## 2025-06-18 DIAGNOSIS — E11.9 TYPE 2 DIABETES MELLITUS WITHOUT COMPLICATION, WITH LONG-TERM CURRENT USE OF INSULIN: ICD-10-CM

## 2025-06-18 DIAGNOSIS — M53.86 DECREASED ROM OF LUMBAR SPINE: ICD-10-CM

## 2025-06-18 DIAGNOSIS — I10 HYPERTENSION, UNSPECIFIED TYPE: ICD-10-CM

## 2025-06-18 DIAGNOSIS — E04.1 THYROID NODULE: ICD-10-CM

## 2025-06-18 DIAGNOSIS — E11.59 HYPERTENSION ASSOCIATED WITH TYPE 2 DIABETES MELLITUS: ICD-10-CM

## 2025-06-18 DIAGNOSIS — N20.0 KIDNEY STONE: ICD-10-CM

## 2025-06-18 DIAGNOSIS — N41.1 CHRONIC PROSTATITIS: ICD-10-CM

## 2025-06-18 DIAGNOSIS — I10 ESSENTIAL HYPERTENSION: ICD-10-CM

## 2025-06-18 DIAGNOSIS — Z00.00 ANNUAL PHYSICAL EXAM: Primary | ICD-10-CM

## 2025-06-18 PROBLEM — R73.03 PREDIABETES: Status: RESOLVED | Noted: 2024-12-18 | Resolved: 2025-06-18

## 2025-06-18 PROCEDURE — 3078F DIAST BP <80 MM HG: CPT | Mod: CPTII,HCNC,S$GLB, | Performed by: INTERNAL MEDICINE

## 2025-06-18 PROCEDURE — 99999 PR PBB SHADOW E&M-EST. PATIENT-LVL III: CPT | Mod: PBBFAC,HCNC,, | Performed by: INTERNAL MEDICINE

## 2025-06-18 PROCEDURE — 99397 PER PM REEVAL EST PAT 65+ YR: CPT | Mod: HCNC,S$GLB,, | Performed by: INTERNAL MEDICINE

## 2025-06-18 PROCEDURE — 3075F SYST BP GE 130 - 139MM HG: CPT | Mod: CPTII,HCNC,S$GLB, | Performed by: INTERNAL MEDICINE

## 2025-06-18 PROCEDURE — 1101F PT FALLS ASSESS-DOCD LE1/YR: CPT | Mod: CPTII,HCNC,S$GLB, | Performed by: INTERNAL MEDICINE

## 2025-06-18 PROCEDURE — 1159F MED LIST DOCD IN RCRD: CPT | Mod: CPTII,HCNC,S$GLB, | Performed by: INTERNAL MEDICINE

## 2025-06-18 PROCEDURE — 3288F FALL RISK ASSESSMENT DOCD: CPT | Mod: CPTII,HCNC,S$GLB, | Performed by: INTERNAL MEDICINE

## 2025-06-18 RX ORDER — TELMISARTAN 40 MG/1
40 TABLET ORAL DAILY
Qty: 90 TABLET | Refills: 3 | Status: SHIPPED | OUTPATIENT
Start: 2025-06-18

## 2025-06-18 RX ORDER — ALPRAZOLAM 0.5 MG/1
0.5 TABLET ORAL 2 TIMES DAILY PRN
Qty: 60 TABLET | Refills: 1 | Status: SHIPPED | OUTPATIENT
Start: 2025-06-18

## 2025-06-18 NOTE — PROGRESS NOTES
Subjective     Patient ID: Ajit Ayoub is a 85 y.o. male.    Chief Complaint: Annual Exam    HPI  85 y.o. Male here for annual exam.      Vaccines: Influenza (2020); Tetanus (2014); Pneumococcal 23 (2012); Prevnar 13 (2018); Shingrix (2020)  Eye exam: current  Colonoscopy: 2011     Exercise: no  Diet: regular    Past Medical History:  No date: Cataract  No date: Chronic prostatitis  No date: Glaucoma  No date: Hypertension  No date: Hyperthyroidism      Comment:  pt states he has a fatty tumor  No date: Obesity (BMI 30-39.9)  3/20/2020: Osteoarthritis of cervical spine  10/26/2021: Type 2 diabetes mellitus without complication, with long-  term current use of insulin  No date: Vitamin D insufficiency  Past Surgical History:  12/5/2018: CIRCUMCISION; N/A      Comment:  Procedure: CIRCUMCISION;  Surgeon: Renato Flores MD;                 Location: [address];  Service: Urology;                 Laterality: N/A;  1.5 hour  12/5/2018: CYSTOSCOPY; N/A      Comment:  Procedure: CYSTOSCOPY;  Surgeon: Renato Flores MD;                 Location: [address];  Service: Urology;                 Laterality: N/A;  No date: TRANSURETHRAL RESECTION OF PROSTATE  Social History    Socioeconomic History      Marital status: Single    Occupational History      Occupation: Retired      Tobacco Use      Smoking status: Never      Smokeless tobacco: Never    Substance and Sexual Activity      Alcohol use: No      Drug use: No      Sexual activity: Not Currently        Partners: Female    Review of patient's allergies indicates:   -- Contrast media -- Rash   -- Pneumoc 13-lori conj-dip cr(pf) -- Rash  Ajit Ayoub had no medications administered during this visit.  Review of Systems   Constitutional:  Negative for activity change, appetite change, chills, diaphoresis, fatigue, fever and unexpected weight change.   HENT:  Negative for nasal congestion, mouth sores, postnasal drip, rhinorrhea, sinus  pressure/congestion, sneezing, sore throat, trouble swallowing and voice change.    Eyes:  Negative for discharge, itching and visual disturbance.   Respiratory:  Negative for cough, chest tightness, shortness of breath and wheezing.    Cardiovascular:  Negative for chest pain, palpitations and leg swelling.   Gastrointestinal:  Negative for abdominal pain, blood in stool, constipation, diarrhea, nausea and vomiting.   Endocrine: Negative for cold intolerance and heat intolerance.   Genitourinary:  Negative for difficulty urinating, dysuria, flank pain, hematuria and urgency.   Musculoskeletal:  Positive for arthralgias, back pain and neck pain. Negative for myalgias.   Integumentary:  Negative for rash and wound.   Allergic/Immunologic: Negative for environmental allergies and food allergies.   Neurological:  Negative for dizziness, tremors, seizures, syncope, weakness and headaches.   Hematological:  Negative for adenopathy. Does not bruise/bleed easily.   Psychiatric/Behavioral:  Negative for confusion, self-injury, sleep disturbance and suicidal ideas. The patient is nervous/anxious.           Objective     Physical Exam  Constitutional:       General: He is not in acute distress.     Appearance: Normal appearance. He is well-developed. He is not ill-appearing, toxic-appearing or diaphoretic.   HENT:      Head: Normocephalic and atraumatic.      Right Ear: External ear normal.      Left Ear: External ear normal.      Nose: Nose normal.      Mouth/Throat:      Pharynx: No oropharyngeal exudate.   Eyes:      General: No scleral icterus.        Right eye: No discharge.         Left eye: No discharge.      Extraocular Movements: Extraocular movements intact.      Conjunctiva/sclera: Conjunctivae normal.      Pupils: Pupils are equal, round, and reactive to light.   Neck:      Thyroid: No thyromegaly.      Vascular: No JVD.   Cardiovascular:      Rate and Rhythm: Normal rate and regular rhythm.      Pulses: Normal  pulses.      Heart sounds: Normal heart sounds. No murmur heard.  Pulmonary:      Effort: Pulmonary effort is normal. No respiratory distress.      Breath sounds: Normal breath sounds. No wheezing or rales.   Abdominal:      General: Bowel sounds are normal. There is no distension.      Palpations: Abdomen is soft.      Tenderness: There is no abdominal tenderness. There is no right CVA tenderness, left CVA tenderness, guarding or rebound.   Musculoskeletal:      Cervical back: Normal range of motion and neck supple. Tenderness present. No rigidity.      Lumbar back: Tenderness present.      Right lower leg: No edema.      Left lower leg: No edema.   Lymphadenopathy:      Cervical: No cervical adenopathy.   Skin:     General: Skin is warm and dry.      Capillary Refill: Capillary refill takes less than 2 seconds.      Coloration: Skin is not pale.      Findings: No rash.   Neurological:      General: No focal deficit present.      Mental Status: He is alert and oriented to person, place, and time. Mental status is at baseline.      Cranial Nerves: No cranial nerve deficit.      Sensory: No sensory deficit.      Motor: No weakness.      Coordination: Coordination normal.      Gait: Gait normal.      Deep Tendon Reflexes: Reflexes normal.   Psychiatric:         Mood and Affect: Mood normal.         Behavior: Behavior normal.         Thought Content: Thought content normal.         Judgment: Judgment normal.            Assessment and Plan     1. Annual physical exam    2. Hypertension, unspecified type  -     CBC auto differential; Future; Expected date: 06/18/2025  -     Comprehensive metabolic panel; Future; Expected date: 06/18/2025  -     Hemoglobin A1c; Future; Expected date: 06/18/2025  -     Lipid panel; Future; Expected date: 06/18/2025  -     PSA, Screening; Future; Expected date: 06/18/2025  -     TSH; Future; Expected date: 06/18/2025  -     Urinalysis; Future; Expected date: 06/18/2025    3. Anxiety  -      ALPRAZolam (XANAX) 0.5 MG tablet; Take 1 tablet (0.5 mg total) by mouth 2 (two) times daily as needed for Anxiety.  Dispense: 60 tablet; Refill: 1    4. Osteoarthritis of cervical spine, unspecified spinal osteoarthritis complication status    5. Decreased ROM of lumbar spine    6. Type 2 diabetes mellitus without complication, with long-term current use of insulin  -     CBC auto differential; Future; Expected date: 06/18/2025  -     Comprehensive metabolic panel; Future; Expected date: 06/18/2025  -     Hemoglobin A1c; Future; Expected date: 06/18/2025  -     Lipid panel; Future; Expected date: 06/18/2025  -     PSA, Screening; Future; Expected date: 06/18/2025  -     TSH; Future; Expected date: 06/18/2025  -     Urinalysis; Future; Expected date: 06/18/2025    7. Thyroid nodule    8. Essential hypertension  -     telmisartan (MICARDIS) 40 MG Tab; Take 1 tablet (40 mg total) by mouth once daily.  Dispense: 90 tablet; Refill: 3  -     CBC auto differential; Future; Expected date: 06/18/2025  -     Comprehensive metabolic panel; Future; Expected date: 06/18/2025  -     Hemoglobin A1c; Future; Expected date: 06/18/2025  -     Lipid panel; Future; Expected date: 06/18/2025  -     PSA, Screening; Future; Expected date: 06/18/2025  -     TSH; Future; Expected date: 06/18/2025  -     Urinalysis; Future; Expected date: 06/18/2025    9. Hypertension associated with type 2 diabetes mellitus  -     CBC auto differential; Future; Expected date: 06/18/2025  -     Comprehensive metabolic panel; Future; Expected date: 06/18/2025  -     Hemoglobin A1c; Future; Expected date: 06/18/2025  -     Lipid panel; Future; Expected date: 06/18/2025  -     PSA, Screening; Future; Expected date: 06/18/2025  -     TSH; Future; Expected date: 06/18/2025  -     Urinalysis; Future; Expected date: 06/18/2025    10. Chronic prostatitis    11. Kidney stone    12. Prostate cancer screening  -     PSA, Screening; Future; Expected date:  06/18/2025         Blood work reviewed with pt      T2DM- diet controlled      HTN- stable on Micardis       Chronic prostatitis- pt has seen Urology       Anxiety- stable on Xanax PRN      Hx of basal cell carcinoma on face- followed by Derm      Thyroid nodule- neg Bx(12/9/19)      hx of nephrolithiasis- stable      DJD Cervical spine/back pain- stable      Hx of gout- stable, followed by Urology      F/u in 6 months

## 2025-07-25 ENCOUNTER — OFFICE VISIT (OUTPATIENT)
Dept: URGENT CARE | Facility: CLINIC | Age: 85
End: 2025-07-25
Payer: MEDICARE

## 2025-07-25 VITALS
HEIGHT: 65 IN | DIASTOLIC BLOOD PRESSURE: 90 MMHG | WEIGHT: 165 LBS | SYSTOLIC BLOOD PRESSURE: 150 MMHG | OXYGEN SATURATION: 98 % | BODY MASS INDEX: 27.49 KG/M2 | RESPIRATION RATE: 16 BRPM | TEMPERATURE: 98 F | HEART RATE: 80 BPM

## 2025-07-25 DIAGNOSIS — L03.115 CELLULITIS OF RIGHT LOWER LEG: Primary | ICD-10-CM

## 2025-07-25 RX ORDER — MUPIROCIN 20 MG/G
OINTMENT TOPICAL 3 TIMES DAILY
Qty: 30 G | Refills: 0 | Status: SHIPPED | OUTPATIENT
Start: 2025-07-25 | End: 2025-08-01

## 2025-07-25 RX ORDER — DOXYCYCLINE 100 MG/1
100 CAPSULE ORAL EVERY 12 HOURS
Qty: 14 CAPSULE | Refills: 0 | Status: SHIPPED | OUTPATIENT
Start: 2025-07-25 | End: 2025-08-01

## 2025-07-25 NOTE — PROGRESS NOTES
"Subjective:      Patient ID: Ajit Ayoub is a 85 y.o. male.    Vitals:  height is 5' 5" (1.651 m) and weight is 74.8 kg (165 lb). His oral temperature is 98 °F (36.7 °C). His blood pressure is 150/90 (abnormal) and his pulse is 80. His respiration is 16 and oxygen saturation is 98%.     Chief Complaint: Foot Swelling    This is a 85 y.o. male who presents today with a chief complaint of wound to right lower leg that looks infected.  Pt states a heavy box fell onto his leg last week.  He saw podiatry this past week, who incised the site to help relieve pressure--he said that there was no pus drainage, but a large bruise. Now site has redness that is spreading. Denies fever. Denies new purulent drainage. Hurts to bear weight, he states-due to the pain.     Wound Check  Prior ED Treatment: bactrim. There has been bloody discharge from the wound. The redness has worsened. The swelling has worsened. There is new pain present. There is difficulty moving the extremity or digit due to pain.     Constitution: Negative for fever.   Skin:  Positive for wound, erythema and bruising. Negative for skin thickening/induration.      Objective:     Physical Exam   Constitutional: He is oriented to person, place, and time.  Non-toxic appearance. He does not appear ill. No distress.   HENT:   Head: Normocephalic.   Nose: Nose normal.   Mouth/Throat: Mucous membranes are moist.   Cardiovascular: Normal rate.   Pulmonary/Chest: Effort normal.   Abdominal: Normal appearance.   Musculoskeletal: Normal range of motion.         General: Normal range of motion.   Neurological: He is alert and oriented to person, place, and time.   Skin: Skin is warm, dry and not diaphoretic. erythema         Comments: Soft tissue of right lower leg: erythema noted, with mild edema (no skin induration); and bruising. There is small open wound. No drainage. Site warm and tender to touch.     Psychiatric: His behavior is normal. Mood normal.   Nursing note " and vitals reviewed.      Above image is a picture of a screen shot (had difficulty uploading original image). Yellow spot is light reflection.      Assessment:     1. Cellulitis of right lower leg        Plan:       Cellulitis of right lower leg  -     doxycycline (VIBRAMYCIN) 100 MG Cap; Take 1 capsule (100 mg total) by mouth every 12 (twelve) hours. for 7 days  Dispense: 14 capsule; Refill: 0  -     mupirocin (BACTROBAN) 2 % ointment; Apply topically 3 (three) times daily. for 7 days  Dispense: 30 g; Refill: 0      Patient Instructions   Keep wound clean and dry    Monitor site and follow up with any: increased redness, swelling, pus-like drainage, red-streaking, or if you develop fever

## 2025-07-25 NOTE — PATIENT INSTRUCTIONS
Keep wound clean and dry    Monitor site and follow up with any: increased redness, swelling, pus-like drainage, red-streaking, or if you develop fever

## (undated) DEVICE — SEE MEDLINE ITEM 157148

## (undated) DEVICE — SEE MEDLINE ITEM 152622

## (undated) DEVICE — SEE MEDLINE ITEM 152529

## (undated) DEVICE — DRESSING XEROFORM FOIL PK 1X8

## (undated) DEVICE — SEE MEDLINE ITEM 146313

## (undated) DEVICE — ELECTRODE REM PLYHSV RETURN 9

## (undated) DEVICE — CATH POLLACK OPEN-END FLEXI-TI

## (undated) DEVICE — GAUZE SPONGE 4X4 12PLY

## (undated) DEVICE — SUT CHROMIC 3-0 SH 27IN GUT

## (undated) DEVICE — SEE MEDLINE ITEM 157181

## (undated) DEVICE — SUT 4-0 VICRYL / SH

## (undated) DEVICE — TRAY MINOR GEN SURG

## (undated) DEVICE — SEE MEDLINE ITEM 154981

## (undated) DEVICE — PACK PERI/GYN OPTIMA

## (undated) DEVICE — SUT MONOCRYL 4-0 PS-2

## (undated) DEVICE — BANDAGE CONFORM 3IN STRL

## (undated) DEVICE — SET IRR URLGY 2LINE UNIV SPIKE

## (undated) DEVICE — SYR 50ML CATH TIP

## (undated) DEVICE — SOL IRR WATER STRL 3000 ML

## (undated) DEVICE — NDL HYPO REG 25G X 1 1/2